# Patient Record
Sex: FEMALE | Race: WHITE | NOT HISPANIC OR LATINO
[De-identification: names, ages, dates, MRNs, and addresses within clinical notes are randomized per-mention and may not be internally consistent; named-entity substitution may affect disease eponyms.]

---

## 2017-05-22 ENCOUNTER — APPOINTMENT (OUTPATIENT)
Dept: HEMATOLOGY ONCOLOGY | Facility: CLINIC | Age: 72
End: 2017-05-22

## 2017-05-22 VITALS
HEART RATE: 67 BPM | BODY MASS INDEX: 22.32 KG/M2 | SYSTOLIC BLOOD PRESSURE: 158 MMHG | TEMPERATURE: 97.8 F | RESPIRATION RATE: 12 BRPM | HEIGHT: 63 IN | DIASTOLIC BLOOD PRESSURE: 78 MMHG | WEIGHT: 126 LBS

## 2017-05-22 LAB
BASOPHILS # BLD: 0.04 TH/MM3
BASOPHILS NFR BLD: 0.7 %
EOSINOPHIL # BLD: 0.29 TH/MM3
EOSINOPHIL NFR BLD: 4.9 %
ERYTHROCYTE [DISTWIDTH] IN BLOOD BY AUTOMATED COUNT: 12.5 %
GRANULOCYTES # BLD: 3.61 TH/MM3
GRANULOCYTES NFR BLD: 60.5 %
HCT VFR BLD AUTO: 37.1 %
HGB BLD-MCNC: 12.5 G/DL
IMM GRANULOCYTES # BLD: 0.09 TH/MM3
IMM GRANULOCYTES NFR BLD: 1.5 %
LYMPHOCYTES # BLD: 1.36 TH/MM3
LYMPHOCYTES NFR BLD: 22.8 %
MCH RBC QN AUTO: 31 PG
MCHC RBC AUTO-ENTMCNC: 33.7 G/DL
MCV RBC AUTO: 92.1 FL
MONOCYTES # BLD: 0.57 TH/MM3
MONOCYTES NFR BLD: 9.6 %
PLATELET # BLD: 181 TH/MM3
PMV BLD AUTO: 9.8 FL
RBC # BLD AUTO: 4.03 MIL/MM3
WBC # BLD: 5.96 TH/MM3

## 2017-05-23 LAB
ALBUMIN SERPL-MCNC: 4.1 G/DL
ALBUMIN/GLOB SERPL: 5.13
ALP SERPL-CCNC: 41 IU/L
ALT SERPL-CCNC: 17 IU/L
ANION GAP SERPL CALC-SCNC: 17 MEQ/L
AST SERPL-CCNC: 26 IU/L
BILIRUB SERPL-MCNC: 0.7 MG/DL
BUN SERPL-MCNC: 17 MG/DL
BUN/CREAT SERPL: 27.4 %
CALCIUM SERPL-MCNC: 9.7 MG/DL
CHLORIDE SERPL-SCNC: 106 MEQ/L
CO2 SERPL-SCNC: 19 MEQ/L
CREAT SERPL-MCNC: 0.62 MG/DL
GFR SERPL CREATININE-BSD FRML MDRD: 95
GLUCOSE SERPL-MCNC: 53 MG/DL
POTASSIUM SERPL-SCNC: 5 MMOL/L
PROT SERPL-MCNC: 4.9 G/DL
SODIUM SERPL-SCNC: 142 MEQ/L

## 2017-05-30 ENCOUNTER — OUTPATIENT (OUTPATIENT)
Dept: OUTPATIENT SERVICES | Facility: HOSPITAL | Age: 72
LOS: 1 days | Discharge: HOME | End: 2017-05-30

## 2017-06-19 ENCOUNTER — OUTPATIENT (OUTPATIENT)
Dept: OUTPATIENT SERVICES | Facility: HOSPITAL | Age: 72
LOS: 1 days | Discharge: HOME | End: 2017-06-19

## 2017-06-19 ENCOUNTER — APPOINTMENT (OUTPATIENT)
Dept: HEMATOLOGY ONCOLOGY | Facility: CLINIC | Age: 72
End: 2017-06-19

## 2017-06-22 LAB
ALBUMIN MFR SERPL ELPH: 57.3 %
ALBUMIN SERPL ELPH-MCNC: 3.9 G/DL
ALBUMIN/GLOB SERPL ELPH: 1.3 ZZ
ALPHA1 GLOB MFR SERPL ELPH: 3.7 %
ALPHA1 GLOB SERPL ELPH-MCNC: 0.3 G/DL
ALPHA2 GLOB MFR SERPL ELPH: 11.1 %
ALPHA2 GLOB SERPL ELPH-MCNC: 0.8 G/DL
B-GLOBULIN SERPL ELPH-MCNC: 0.7 G/DL
BETA1 GLOB MFR SERPL ELPH: 9.9 %
GAMMA GLOB MFR SERPL ELPH: 18 %
GAMMA GLOB SERPL ELPH-MCNC: 1.2 G/DL
IGA FLD-MCNC: 109 MG/DL
IGG FLD-MCNC: 1160 MG/DL
IGM SERPL-MCNC: 340 MG/DL
PROT PATTERN SERPL ELPH-IMP: 6.8 G/DL
PROT PATTERN SERPL ELPH-IMP: NORMAL
PROT SERPL-MCNC: 6.8 G/DL

## 2017-06-23 LAB
INTERPRETATION SERPL IFE-IMP: NORMAL
KAPPA LC FREE SER-MCNC: 21.3 MG/L
KAPPA LC FREE/LAMBDA FREE SER: 0.78
LAMBDA LC FREE SERPL-MCNC: 27.2 MG/L

## 2017-06-28 DIAGNOSIS — Z85.3 PERSONAL HISTORY OF MALIGNANT NEOPLASM OF BREAST: ICD-10-CM

## 2017-09-26 ENCOUNTER — OUTPATIENT (OUTPATIENT)
Dept: OUTPATIENT SERVICES | Facility: HOSPITAL | Age: 72
LOS: 1 days | Discharge: HOME | End: 2017-09-26

## 2017-09-27 DIAGNOSIS — I48.0 PAROXYSMAL ATRIAL FIBRILLATION: ICD-10-CM

## 2017-11-20 ENCOUNTER — APPOINTMENT (OUTPATIENT)
Dept: HEMATOLOGY ONCOLOGY | Facility: CLINIC | Age: 72
End: 2017-11-20

## 2017-11-20 ENCOUNTER — OUTPATIENT (OUTPATIENT)
Dept: OUTPATIENT SERVICES | Facility: HOSPITAL | Age: 72
LOS: 1 days | Discharge: HOME | End: 2017-11-20

## 2017-11-20 VITALS
RESPIRATION RATE: 12 BRPM | WEIGHT: 125 LBS | DIASTOLIC BLOOD PRESSURE: 86 MMHG | SYSTOLIC BLOOD PRESSURE: 181 MMHG | HEART RATE: 68 BPM | BODY MASS INDEX: 22.15 KG/M2 | TEMPERATURE: 97.8 F | HEIGHT: 63 IN

## 2017-11-20 LAB
BASOPHILS # BLD: 0.03 TH/MM3
BASOPHILS NFR BLD: 0.5 %
EOSINOPHIL # BLD: 0.2 TH/MM3
EOSINOPHIL NFR BLD: 3.2 %
ERYTHROCYTE [DISTWIDTH] IN BLOOD BY AUTOMATED COUNT: 12.4 %
GRANULOCYTES # BLD: 4.15 TH/MM3
GRANULOCYTES NFR BLD: 67.3 %
HCT VFR BLD AUTO: 36.6 %
HGB BLD-MCNC: 12 G/DL
IMM GRANULOCYTES # BLD: 0.01 TH/MM3
IMM GRANULOCYTES NFR BLD: 0.2 %
LYMPHOCYTES # BLD: 1.23 TH/MM3
LYMPHOCYTES NFR BLD: 19.9 %
MCH RBC QN AUTO: 30.5 PG
MCHC RBC AUTO-ENTMCNC: 32.8 G/DL
MCV RBC AUTO: 92.9 FL
MONOCYTES # BLD: 0.55 TH/MM3
MONOCYTES NFR BLD: 8.9 %
PLATELET # BLD: 215 TH/MM3
PMV BLD AUTO: 9.3 FL
RBC # BLD AUTO: 3.94 MIL/MM3
WBC # BLD: 6.17 TH/MM3

## 2017-11-21 LAB
ALBUMIN SERPL-MCNC: 4.1 G/DL
ALBUMIN/GLOB SERPL: 1.64
ALP SERPL-CCNC: 50 IU/L
ALT SERPL-CCNC: 23 IU/L
ANION GAP SERPL CALC-SCNC: 7 MEQ/L
AST SERPL-CCNC: 28 IU/L
BILIRUB SERPL-MCNC: 0.8 MG/DL
BUN SERPL-MCNC: 14 MG/DL
BUN/CREAT SERPL: 20.9 %
CALCIUM SERPL-MCNC: 9.8 MG/DL
CANCER AG15-3 SERPL-ACNC: 12.5 U/ML
CHLORIDE SERPL-SCNC: 107 MEQ/L
CO2 SERPL-SCNC: 26 MEQ/L
CREAT SERPL-MCNC: 0.67 MG/DL
GFR SERPL CREATININE-BSD FRML MDRD: 87
GLUCOSE SERPL-MCNC: 72 MG/DL
POTASSIUM SERPL-SCNC: 4.3 MMOL/L
PROT SERPL-MCNC: 6.6 G/DL
SODIUM SERPL-SCNC: 140 MEQ/L

## 2017-11-27 DIAGNOSIS — C50.919 MALIGNANT NEOPLASM OF UNSPECIFIED SITE OF UNSPECIFIED FEMALE BREAST: ICD-10-CM

## 2018-05-04 ENCOUNTER — OUTPATIENT (OUTPATIENT)
Dept: OUTPATIENT SERVICES | Facility: HOSPITAL | Age: 73
LOS: 1 days | Discharge: HOME | End: 2018-05-04

## 2018-05-04 VITALS
OXYGEN SATURATION: 99 % | SYSTOLIC BLOOD PRESSURE: 160 MMHG | DIASTOLIC BLOOD PRESSURE: 72 MMHG | HEIGHT: 62 IN | RESPIRATION RATE: 17 BRPM | TEMPERATURE: 98 F | WEIGHT: 121.92 LBS | HEART RATE: 71 BPM

## 2018-05-04 VITALS — RESPIRATION RATE: 17 BRPM | HEART RATE: 71 BPM | OXYGEN SATURATION: 100 %

## 2018-05-04 DIAGNOSIS — Z96.642 PRESENCE OF LEFT ARTIFICIAL HIP JOINT: Chronic | ICD-10-CM

## 2018-05-04 DIAGNOSIS — Z90.13 ACQUIRED ABSENCE OF BILATERAL BREASTS AND NIPPLES: Chronic | ICD-10-CM

## 2018-05-04 RX ORDER — ACETAMINOPHEN 500 MG
650 TABLET ORAL ONCE
Qty: 0 | Refills: 0 | Status: DISCONTINUED | OUTPATIENT
Start: 2018-05-04 | End: 2018-05-19

## 2018-05-04 RX ORDER — SODIUM CHLORIDE 9 MG/ML
1000 INJECTION, SOLUTION INTRAVENOUS
Qty: 0 | Refills: 0 | Status: DISCONTINUED | OUTPATIENT
Start: 2018-05-04 | End: 2018-05-19

## 2018-05-04 RX ORDER — ONDANSETRON 8 MG/1
4 TABLET, FILM COATED ORAL ONCE
Qty: 0 | Refills: 0 | Status: DISCONTINUED | OUTPATIENT
Start: 2018-05-04 | End: 2018-05-19

## 2018-05-08 DIAGNOSIS — H25.89 OTHER AGE-RELATED CATARACT: ICD-10-CM

## 2018-05-08 DIAGNOSIS — I10 ESSENTIAL (PRIMARY) HYPERTENSION: ICD-10-CM

## 2018-05-08 DIAGNOSIS — H52.202 UNSPECIFIED ASTIGMATISM, LEFT EYE: ICD-10-CM

## 2018-05-21 ENCOUNTER — APPOINTMENT (OUTPATIENT)
Dept: HEMATOLOGY ONCOLOGY | Facility: CLINIC | Age: 73
End: 2018-05-21

## 2018-05-21 ENCOUNTER — LABORATORY RESULT (OUTPATIENT)
Age: 73
End: 2018-05-21

## 2018-05-21 ENCOUNTER — OUTPATIENT (OUTPATIENT)
Dept: OUTPATIENT SERVICES | Facility: HOSPITAL | Age: 73
LOS: 1 days | Discharge: HOME | End: 2018-05-21

## 2018-05-21 VITALS
DIASTOLIC BLOOD PRESSURE: 57 MMHG | HEART RATE: 72 BPM | WEIGHT: 122 LBS | TEMPERATURE: 99 F | RESPIRATION RATE: 14 BRPM | BODY MASS INDEX: 21.62 KG/M2 | HEIGHT: 63 IN | SYSTOLIC BLOOD PRESSURE: 146 MMHG

## 2018-05-21 DIAGNOSIS — Z90.13 ACQUIRED ABSENCE OF BILATERAL BREASTS AND NIPPLES: Chronic | ICD-10-CM

## 2018-05-21 DIAGNOSIS — Z96.642 PRESENCE OF LEFT ARTIFICIAL HIP JOINT: Chronic | ICD-10-CM

## 2018-05-22 LAB
ALBUMIN SERPL ELPH-MCNC: 4.1 G/DL
ALP BLD-CCNC: 53 U/L
ALT SERPL-CCNC: 17 U/L
ANION GAP SERPL CALC-SCNC: 17 MMOL/L
AST SERPL-CCNC: 29 U/L
BILIRUB SERPL-MCNC: 0.4 MG/DL
BUN SERPL-MCNC: 15 MG/DL
CALCIUM SERPL-MCNC: 10 MG/DL
CHLORIDE SERPL-SCNC: 103 MMOL/L
CO2 SERPL-SCNC: 23 MMOL/L
CREAT SERPL-MCNC: 0.8 MG/DL
GLUCOSE SERPL-MCNC: 74 MG/DL
HCT VFR BLD CALC: 37.6 %
HGB BLD-MCNC: 12.3 G/DL
MCHC RBC-ENTMCNC: 30.5 PG
MCHC RBC-ENTMCNC: 32.7 G/DL
MCV RBC AUTO: 93.3 FL
PLATELET # BLD AUTO: 169 K/UL
PMV BLD: 9.5 FL
POTASSIUM SERPL-SCNC: 4.4 MMOL/L
PROT SERPL-MCNC: 7.1 G/DL
RBC # BLD: 4.03 M/UL
RBC # FLD: 12 %
SODIUM SERPL-SCNC: 143 MMOL/L
WBC # FLD AUTO: 5.8 K/UL

## 2018-05-23 DIAGNOSIS — Z85.3 PERSONAL HISTORY OF MALIGNANT NEOPLASM OF BREAST: ICD-10-CM

## 2018-05-31 ENCOUNTER — OUTPATIENT (OUTPATIENT)
Dept: OUTPATIENT SERVICES | Facility: HOSPITAL | Age: 73
LOS: 1 days | Discharge: HOME | End: 2018-05-31

## 2018-05-31 DIAGNOSIS — I25.10 ATHEROSCLEROTIC HEART DISEASE OF NATIVE CORONARY ARTERY WITHOUT ANGINA PECTORIS: ICD-10-CM

## 2018-05-31 DIAGNOSIS — Z79.01 LONG TERM (CURRENT) USE OF ANTICOAGULANTS: ICD-10-CM

## 2018-05-31 DIAGNOSIS — Z96.642 PRESENCE OF LEFT ARTIFICIAL HIP JOINT: Chronic | ICD-10-CM

## 2018-05-31 DIAGNOSIS — Z90.13 ACQUIRED ABSENCE OF BILATERAL BREASTS AND NIPPLES: Chronic | ICD-10-CM

## 2018-05-31 DIAGNOSIS — Z01.810 ENCOUNTER FOR PREPROCEDURAL CARDIOVASCULAR EXAMINATION: ICD-10-CM

## 2018-06-07 ENCOUNTER — OUTPATIENT (OUTPATIENT)
Dept: OUTPATIENT SERVICES | Facility: HOSPITAL | Age: 73
LOS: 1 days | Discharge: HOME | End: 2018-06-07

## 2018-06-07 DIAGNOSIS — Z90.13 ACQUIRED ABSENCE OF BILATERAL BREASTS AND NIPPLES: Chronic | ICD-10-CM

## 2018-06-07 DIAGNOSIS — I48.0 PAROXYSMAL ATRIAL FIBRILLATION: ICD-10-CM

## 2018-06-07 DIAGNOSIS — Z96.642 PRESENCE OF LEFT ARTIFICIAL HIP JOINT: Chronic | ICD-10-CM

## 2018-06-07 NOTE — PROGRESS NOTE ADULT - SUBJECTIVE AND OBJECTIVE BOX
PREOPERATIVE DAY OR PROCEDURE EVALUATION    I have personally seen and examined the patient.  I agree with the history and physical which I have reviewed and noted any changes below.  (ELECTRONICALLY SIGNED: - [id number] @ 10:02)                                                              POST OPERATIVE PROCEDURAL DOCUMENTATION  PRE-OP DIAGNOSIS: fibroelastoma of AV     POST-OP DIAGNOSIS Fibroelastoma of AV     PROCEDURE: Transesophageal echocardiogram    Primary Physician: Rajeev Lim MD  Fellow: Dr Mauricio    ANESTHESIA TYPE  [x  ] General Anesthesia  [  ] Conscious Sedation  [  ] Local/Regional    CONDITION  [  ] Critical  [  ] Serious  [  ] Fair  [  x] Good    SPECIMENS REMOVED (IF APPLICABLE): NA    IMPLANTS (IF APPLICABLE): None    ESTIMATED BLOOD LOSS: None    COMPLICATIONS: None    FINDINGS  Fibroelastoma was noted on the Right coronary cusp measuring 1.2 cm unchanged from previous TOM last year   Normal EF   mod TR      DIAGNOSIS/IMPRESSION:  Fibroelastoma of the AV       PLAN OF CARE:  out patient follow up   may resume diet in 2 hours

## 2018-06-07 NOTE — H&P ADULT - NSHPREVIEWOFSYSTEMS_GEN_ALL_CORE
REVIEW OF SYSTEMS      CONSTITUTIONAL:  No night sweats.  No fatigue, malaise, lethargy.  No fever or chills.    RESPIRATORY:  No cough.  No wheeze.  No hemoptysis.  No shortness of breath.    CARDIOVASCULAR:  No chest pains.  No palpitations.     GASTROINTESTINAL:  No abdominal pain.  No nausea or vomiting.  No diarrhea or constipation.  No hematemesis.  No hematochezia.  No melena.    MUSCULOSKELETAL:  No musculoskeletal pain.  No joint swelling.  No arthritis.    NEUROLOGICAL:    No headache or neck pain.  No syncope or seizure. no weakness . No Vertigo.    SKIN:  No rashes.  No lesions.  No wounds.    ENDOCRINE:  No unexplained weight loss.  No polydipsia.  No polyuria.  No polyphagia.    ALLERGIC AND IMMUNOLOGIC:  No pruritus.  No swelling.

## 2018-06-07 NOTE — H&P ADULT - NSHPPHYSICALEXAM_GEN_ALL_CORE
PHYSICAL EXAM:    GENERAL APPEARANCE:  The patient is alert, oriented . He is in no acute distress.  HEART:  S1 S2 Regular rate and rhythm. + systolic murmur in the apex  LUNGS:  clear to ausculation bilaterally.  No crackles or wheezes are heard.  ABDOMEN:  Soft, nontender, nondistended with good bowel sounds heard.  Inguinal area is normal.  EXTREMITIES:  bilateral upper extremity lymphedema (HX of Breast CA surgery)  NEUROLOGICAL:  Gross nonfocal.  Skin:  Warm and dry without any rash.  There is no costovertebral angle tenderness.

## 2018-07-12 ENCOUNTER — OUTPATIENT (OUTPATIENT)
Dept: OUTPATIENT SERVICES | Facility: HOSPITAL | Age: 73
LOS: 1 days | Discharge: HOME | End: 2018-07-12

## 2018-07-12 DIAGNOSIS — Z90.13 ACQUIRED ABSENCE OF BILATERAL BREASTS AND NIPPLES: Chronic | ICD-10-CM

## 2018-07-12 DIAGNOSIS — R06.02 SHORTNESS OF BREATH: ICD-10-CM

## 2018-07-12 DIAGNOSIS — R03.0 ELEVATED BLOOD-PRESSURE READING, WITHOUT DIAGNOSIS OF HYPERTENSION: ICD-10-CM

## 2018-07-12 DIAGNOSIS — Z96.642 PRESENCE OF LEFT ARTIFICIAL HIP JOINT: Chronic | ICD-10-CM

## 2018-07-12 DIAGNOSIS — R07.89 OTHER CHEST PAIN: ICD-10-CM

## 2019-04-01 PROBLEM — C50.919 MALIGNANT NEOPLASM OF UNSPECIFIED SITE OF UNSPECIFIED FEMALE BREAST: Chronic | Status: ACTIVE | Noted: 2018-05-04

## 2019-04-01 PROBLEM — J30.1 ALLERGIC RHINITIS DUE TO POLLEN: Chronic | Status: ACTIVE | Noted: 2018-05-04

## 2019-04-01 PROBLEM — I10 ESSENTIAL (PRIMARY) HYPERTENSION: Chronic | Status: ACTIVE | Noted: 2018-05-04

## 2019-05-07 ENCOUNTER — APPOINTMENT (OUTPATIENT)
Dept: CARDIOLOGY | Facility: CLINIC | Age: 74
End: 2019-05-07
Payer: MEDICARE

## 2019-05-07 PROCEDURE — 93880 EXTRACRANIAL BILAT STUDY: CPT

## 2019-05-20 ENCOUNTER — APPOINTMENT (OUTPATIENT)
Dept: CARDIOLOGY | Facility: CLINIC | Age: 74
End: 2019-05-20
Payer: MEDICARE

## 2019-05-20 PROCEDURE — 93306 TTE W/DOPPLER COMPLETE: CPT

## 2019-05-28 ENCOUNTER — APPOINTMENT (OUTPATIENT)
Dept: CARDIOLOGY | Facility: CLINIC | Age: 74
End: 2019-05-28
Payer: MEDICARE

## 2019-05-28 PROCEDURE — 93000 ELECTROCARDIOGRAM COMPLETE: CPT

## 2019-05-28 PROCEDURE — 99214 OFFICE O/P EST MOD 30 MIN: CPT

## 2019-06-13 ENCOUNTER — OUTPATIENT (OUTPATIENT)
Dept: OUTPATIENT SERVICES | Facility: HOSPITAL | Age: 74
LOS: 1 days | Discharge: HOME | End: 2019-06-13
Payer: MEDICARE

## 2019-06-13 VITALS — HEIGHT: 63 IN | WEIGHT: 125 LBS

## 2019-06-13 DIAGNOSIS — Z90.13 ACQUIRED ABSENCE OF BILATERAL BREASTS AND NIPPLES: Chronic | ICD-10-CM

## 2019-06-13 DIAGNOSIS — Z96.642 PRESENCE OF LEFT ARTIFICIAL HIP JOINT: Chronic | ICD-10-CM

## 2019-06-13 PROCEDURE — 93325 DOPPLER ECHO COLOR FLOW MAPG: CPT | Mod: 26

## 2019-06-13 PROCEDURE — 93320 DOPPLER ECHO COMPLETE: CPT | Mod: 26

## 2019-06-13 PROCEDURE — 93312 ECHO TRANSESOPHAGEAL: CPT | Mod: 26

## 2019-06-13 NOTE — ASU PATIENT PROFILE, ADULT - PMH
AF (paroxysmal atrial fibrillation)    Breast cancer    Hayfever    HTN (hypertension)    Nonrheumatic mitral valve insufficiency AF (paroxysmal atrial fibrillation)    Breast cancer    Hayfever    HTN (hypertension)    Nonrheumatic mitral valve insufficiency    Papillary fibroelastoma of heart

## 2019-06-13 NOTE — H&P CARDIOLOGY - HISTORY OF PRESENT ILLNESS
73yF PMhx of MR, MM, DLD, DM presents for elective TOM to monitor for progression of mitral regurgitation. She has some SOB on strenuous exertion. 73yF PMhx of MR, MM, DLD, DM presents for elective TOM to monitor for progression of mitral regurgitation and fibrolastoma She has some SOB on strenuous exertion.

## 2019-06-13 NOTE — CHART NOTE - NSCHARTNOTEFT_GEN_A_CORE
TOM Procedure Note:     After risks, benefits and alternatives of the procedure were explained, consent was signed and placed in the medical record.  Procedural timeout was taken.  Sedation was administered by anesthesia.  TOM probe inserted without complication and TOM performed.  Patient tolerated the procedure well without complication.  Post-procedure vital signs were stable.    TOM findings discussed with patient and referring cardiologist.       TOM findings:  LA: NL , no MARCELLA thrombus  RA : normal  IA septum : intact  MV: normal structure , with mild MR ( 2 cental jets)  AV: there is a fibroelastoma on the right coronary cusp , measuring 1-1.2 cm  TV: mild TR  LV: NL  RV: NL  Aorta: no atheroma    see report for full details. TOM Procedure Note:     After risks, benefits and alternatives of the procedure were explained, consent was signed and placed in the medical record.  Procedural timeout was taken.  Sedation was administered by anesthesia.  TOM probe inserted without complication and TOM performed.  Patient tolerated the procedure well without complication.  Post-procedure vital signs were stable.    TOM findings discussed with patient and referring cardiologist.       TOM findings:  LA: NL , no MARCELLA thrombus  RA : normal  IA septum : intact  MV: thickened anteriorly and postriorly with post leaflet prolapse , with mild MR ( 2 cental jets)  AV: there is a fibroelastoma on the right coronary cusp , measuring 1-1.2 cm  TV: mild TR  LV: NL  RV: NL  Aorta: no atheroma    see report for full details.

## 2019-06-14 DIAGNOSIS — I36.8 OTHER NONRHEUMATIC TRICUSPID VALVE DISORDERS: ICD-10-CM

## 2019-06-17 ENCOUNTER — LABORATORY RESULT (OUTPATIENT)
Age: 74
End: 2019-06-17

## 2019-06-17 ENCOUNTER — APPOINTMENT (OUTPATIENT)
Dept: HEMATOLOGY ONCOLOGY | Facility: CLINIC | Age: 74
End: 2019-06-17
Payer: MEDICARE

## 2019-06-17 ENCOUNTER — OUTPATIENT (OUTPATIENT)
Dept: OUTPATIENT SERVICES | Facility: HOSPITAL | Age: 74
LOS: 1 days | Discharge: HOME | End: 2019-06-17

## 2019-06-17 VITALS
DIASTOLIC BLOOD PRESSURE: 66 MMHG | SYSTOLIC BLOOD PRESSURE: 147 MMHG | BODY MASS INDEX: 21.62 KG/M2 | HEART RATE: 76 BPM | HEIGHT: 63 IN | RESPIRATION RATE: 14 BRPM | WEIGHT: 122 LBS | TEMPERATURE: 98.3 F

## 2019-06-17 DIAGNOSIS — E04.1 NONTOXIC SINGLE THYROID NODULE: ICD-10-CM

## 2019-06-17 DIAGNOSIS — Z90.13 ACQUIRED ABSENCE OF BILATERAL BREASTS AND NIPPLES: Chronic | ICD-10-CM

## 2019-06-17 DIAGNOSIS — Z96.642 PRESENCE OF LEFT ARTIFICIAL HIP JOINT: Chronic | ICD-10-CM

## 2019-06-17 PROCEDURE — 99213 OFFICE O/P EST LOW 20 MIN: CPT

## 2019-06-17 NOTE — PHYSICAL EXAM
[Thin] : thin [Normal] : affect appropriate [de-identified] : S/P bilateral mastectomies clinically no evidence of local recurrence [de-identified] : Arthritic changes present.

## 2019-06-17 NOTE — ASSESSMENT
[FreeTextEntry1] : History of bilateral breast carcinomas (1997 and 2001), S/P bilateral mastectomies followed by chemotherapy, hormonal therapy and radiation to the chest wall, clinically no evidence of recurrence.\par \par We will obtain a CBC and CMP. If all within acceptable limits, she will be seen again in a year for follow up. Otherwise she will follow up with her primary physician and the gynecologist. \par We will also get a thyroid sonogram since she was noted to have a thyroid nodule in the past.\par \par All questions answered. \par \par

## 2019-06-17 NOTE — REVIEW OF SYSTEMS
[Fatigue] : fatigue [Constipation] : constipation [Joint Stiffness] : joint stiffness [Negative] : Heme/Lymph [FreeTextEntry3] : S/P cataract surgery (left)

## 2019-06-17 NOTE — HISTORY OF PRESENT ILLNESS
[Disease: _____________________] : Disease: [unfilled] [de-identified] : The patient is coming for her regularly scheduled yearly follow up.\par Since last year, she had replacement of the second hip (the right).\par Otherwise, she was followed closely by her cardiologists. She had a TOM for a possible small tumor at the aortic valve. She was told that she should be observed for the time being.\par Otherwise, she seems to be stable. However, when she thinks about her problems, she gets anxious and nervous.\par No fever or night sweats unless she gets cellulitis of the arm/arms which responds fast to antibiotherapy.\par She is still chronically constipated.\par No problems with the urine. The appetite is stable.\par

## 2019-06-18 PROBLEM — D15.1 BENIGN NEOPLASM OF HEART: Chronic | Status: ACTIVE | Noted: 2019-06-13

## 2019-06-18 PROBLEM — I34.0 NONRHEUMATIC MITRAL (VALVE) INSUFFICIENCY: Chronic | Status: ACTIVE | Noted: 2019-06-13

## 2019-06-18 LAB
ALBUMIN SERPL ELPH-MCNC: 4.2 G/DL
ALP BLD-CCNC: 51 U/L
ALT SERPL-CCNC: 18 U/L
ANION GAP SERPL CALC-SCNC: 14 MMOL/L
AST SERPL-CCNC: 26 U/L
BILIRUB SERPL-MCNC: 0.5 MG/DL
BUN SERPL-MCNC: 16 MG/DL
CALCIUM SERPL-MCNC: 10.3 MG/DL
CHLORIDE SERPL-SCNC: 103 MMOL/L
CO2 SERPL-SCNC: 25 MMOL/L
CREAT SERPL-MCNC: 0.7 MG/DL
GLUCOSE SERPL-MCNC: 76 MG/DL
HCT VFR BLD CALC: 37.7 %
HGB BLD-MCNC: 12.4 G/DL
MCHC RBC-ENTMCNC: 30.6 PG
MCHC RBC-ENTMCNC: 32.9 G/DL
MCV RBC AUTO: 93.1 FL
PLATELET # BLD AUTO: 247 K/UL
PMV BLD: 9.5 FL
POTASSIUM SERPL-SCNC: 4.3 MMOL/L
PROT SERPL-MCNC: 7.4 G/DL
RBC # BLD: 4.05 M/UL
RBC # FLD: 12.1 %
SODIUM SERPL-SCNC: 142 MMOL/L
WBC # FLD AUTO: 5.9 K/UL

## 2019-06-19 DIAGNOSIS — E04.1 NONTOXIC SINGLE THYROID NODULE: ICD-10-CM

## 2019-06-19 DIAGNOSIS — Z85.3 PERSONAL HISTORY OF MALIGNANT NEOPLASM OF BREAST: ICD-10-CM

## 2019-06-19 LAB
T4 SERPL-MCNC: 6.3 UG/DL
TSH SERPL-ACNC: 1.56 UIU/ML

## 2019-06-20 ENCOUNTER — FORM ENCOUNTER (OUTPATIENT)
Age: 74
End: 2019-06-20

## 2019-06-21 ENCOUNTER — OUTPATIENT (OUTPATIENT)
Dept: OUTPATIENT SERVICES | Facility: HOSPITAL | Age: 74
LOS: 1 days | Discharge: HOME | End: 2019-06-21
Payer: MEDICARE

## 2019-06-21 DIAGNOSIS — Z96.642 PRESENCE OF LEFT ARTIFICIAL HIP JOINT: Chronic | ICD-10-CM

## 2019-06-21 DIAGNOSIS — Z90.13 ACQUIRED ABSENCE OF BILATERAL BREASTS AND NIPPLES: Chronic | ICD-10-CM

## 2019-06-21 DIAGNOSIS — E04.1 NONTOXIC SINGLE THYROID NODULE: ICD-10-CM

## 2019-06-21 PROCEDURE — 76536 US EXAM OF HEAD AND NECK: CPT | Mod: 26

## 2019-12-05 ENCOUNTER — RESULT REVIEW (OUTPATIENT)
Age: 74
End: 2019-12-05

## 2019-12-05 ENCOUNTER — TRANSCRIPTION ENCOUNTER (OUTPATIENT)
Age: 74
End: 2019-12-05

## 2019-12-05 ENCOUNTER — OUTPATIENT (OUTPATIENT)
Dept: OUTPATIENT SERVICES | Facility: HOSPITAL | Age: 74
LOS: 1 days | Discharge: HOME | End: 2019-12-05
Payer: MEDICARE

## 2019-12-05 VITALS
SYSTOLIC BLOOD PRESSURE: 183 MMHG | DIASTOLIC BLOOD PRESSURE: 72 MMHG | HEART RATE: 56 BPM | RESPIRATION RATE: 18 BRPM | OXYGEN SATURATION: 98 %

## 2019-12-05 VITALS
DIASTOLIC BLOOD PRESSURE: 77 MMHG | SYSTOLIC BLOOD PRESSURE: 178 MMHG | HEIGHT: 63 IN | WEIGHT: 125 LBS | TEMPERATURE: 98 F | RESPIRATION RATE: 18 BRPM | HEART RATE: 83 BPM

## 2019-12-05 DIAGNOSIS — Z96.643 PRESENCE OF ARTIFICIAL HIP JOINT, BILATERAL: Chronic | ICD-10-CM

## 2019-12-05 DIAGNOSIS — Z96.642 PRESENCE OF LEFT ARTIFICIAL HIP JOINT: Chronic | ICD-10-CM

## 2019-12-05 DIAGNOSIS — Z90.13 ACQUIRED ABSENCE OF BILATERAL BREASTS AND NIPPLES: Chronic | ICD-10-CM

## 2019-12-05 PROCEDURE — 43259 EGD US EXAM DUODENUM/JEJUNUM: CPT

## 2019-12-05 PROCEDURE — 43239 EGD BIOPSY SINGLE/MULTIPLE: CPT | Mod: XU

## 2019-12-05 PROCEDURE — 88312 SPECIAL STAINS GROUP 1: CPT | Mod: 26

## 2019-12-05 PROCEDURE — 88173 CYTOPATH EVAL FNA REPORT: CPT | Mod: 26

## 2019-12-05 PROCEDURE — 88305 TISSUE EXAM BY PATHOLOGIST: CPT | Mod: 26

## 2019-12-05 RX ORDER — CIPROFLOXACIN LACTATE 400MG/40ML
400 VIAL (ML) INTRAVENOUS ONCE
Refills: 0 | Status: COMPLETED | OUTPATIENT
Start: 2019-12-05 | End: 2019-12-05

## 2019-12-05 RX ORDER — CIPROFLOXACIN LACTATE 400MG/40ML
200 VIAL (ML) INTRAVENOUS ONCE
Refills: 0 | Status: DISCONTINUED | OUTPATIENT
Start: 2019-12-05 | End: 2019-12-28

## 2019-12-05 RX ADMIN — Medication 200 MILLIGRAM(S): at 17:13

## 2019-12-05 NOTE — H&P ADULT - ASSESSMENT
Patient referred by Dr. Rowell for evaluation of a Pancreatic cyst. CT from 2013 notable for a Pancreatic body cyst with connection to PD ( at the time 1 cm).     Plan EUS with possible FNA

## 2019-12-05 NOTE — ASU PATIENT PROFILE, ADULT - PMH
AF (paroxysmal atrial fibrillation)    Breast cancer    Hayfever    HTN (hypertension)    Nonrheumatic mitral valve insufficiency    Papillary fibroelastoma of heart

## 2019-12-05 NOTE — ASU PATIENT PROFILE, ADULT - PSH
H/O bilateral mastectomy  BILAT LYMPHEDEMA  H/O total hip arthroplasty, left    History of total hip replacement, bilateral

## 2019-12-05 NOTE — H&P ADULT - HISTORY OF PRESENT ILLNESS
Patient referred by Dr. Rowell for evaluation of a Pancreatic cyst. CT from 2013 notable for a Pancreatic body cyst with connection to PD ( at the time 1 cm).

## 2019-12-06 LAB
AMYLASE FLD-CCNC: 81 U/L — SIGNIFICANT CHANGE UP
SPECIMEN SOURCE FLD: SIGNIFICANT CHANGE UP

## 2019-12-07 LAB — CEA FLD-MCNC: 1405 NG/ML — SIGNIFICANT CHANGE UP

## 2019-12-09 LAB
NON-GYNECOLOGICAL CYTOLOGY STUDY: SIGNIFICANT CHANGE UP
SURGICAL PATHOLOGY STUDY: SIGNIFICANT CHANGE UP

## 2019-12-10 ENCOUNTER — APPOINTMENT (OUTPATIENT)
Dept: CARDIOLOGY | Facility: CLINIC | Age: 74
End: 2019-12-10
Payer: MEDICARE

## 2019-12-10 PROCEDURE — 93000 ELECTROCARDIOGRAM COMPLETE: CPT

## 2019-12-10 PROCEDURE — 99214 OFFICE O/P EST MOD 30 MIN: CPT

## 2019-12-11 DIAGNOSIS — K86.2 CYST OF PANCREAS: ICD-10-CM

## 2019-12-11 DIAGNOSIS — Z79.82 LONG TERM (CURRENT) USE OF ASPIRIN: ICD-10-CM

## 2019-12-11 DIAGNOSIS — K31.89 OTHER DISEASES OF STOMACH AND DUODENUM: ICD-10-CM

## 2019-12-11 DIAGNOSIS — K29.50 UNSPECIFIED CHRONIC GASTRITIS WITHOUT BLEEDING: ICD-10-CM

## 2020-06-08 ENCOUNTER — APPOINTMENT (OUTPATIENT)
Dept: CARDIOLOGY | Facility: CLINIC | Age: 75
End: 2020-06-08
Payer: MEDICARE

## 2020-06-08 PROCEDURE — 93306 TTE W/DOPPLER COMPLETE: CPT

## 2020-06-18 ENCOUNTER — APPOINTMENT (OUTPATIENT)
Dept: CARDIOLOGY | Facility: CLINIC | Age: 75
End: 2020-06-18

## 2020-06-29 ENCOUNTER — APPOINTMENT (OUTPATIENT)
Dept: HEMATOLOGY ONCOLOGY | Facility: CLINIC | Age: 75
End: 2020-06-29
Payer: MEDICARE

## 2020-06-29 ENCOUNTER — LABORATORY RESULT (OUTPATIENT)
Age: 75
End: 2020-06-29

## 2020-06-29 ENCOUNTER — OUTPATIENT (OUTPATIENT)
Dept: OUTPATIENT SERVICES | Facility: HOSPITAL | Age: 75
LOS: 1 days | Discharge: HOME | End: 2020-06-29

## 2020-06-29 VITALS
HEIGHT: 63 IN | DIASTOLIC BLOOD PRESSURE: 70 MMHG | BODY MASS INDEX: 21.97 KG/M2 | RESPIRATION RATE: 14 BRPM | HEART RATE: 68 BPM | SYSTOLIC BLOOD PRESSURE: 166 MMHG | WEIGHT: 124 LBS | TEMPERATURE: 98.6 F

## 2020-06-29 DIAGNOSIS — Z96.642 PRESENCE OF LEFT ARTIFICIAL HIP JOINT: Chronic | ICD-10-CM

## 2020-06-29 DIAGNOSIS — Z96.643 PRESENCE OF ARTIFICIAL HIP JOINT, BILATERAL: Chronic | ICD-10-CM

## 2020-06-29 DIAGNOSIS — Z90.13 ACQUIRED ABSENCE OF BILATERAL BREASTS AND NIPPLES: Chronic | ICD-10-CM

## 2020-06-29 PROCEDURE — 99213 OFFICE O/P EST LOW 20 MIN: CPT

## 2020-06-30 LAB
ALBUMIN SERPL ELPH-MCNC: 4.2 G/DL
ALP BLD-CCNC: 56 U/L
ALT SERPL-CCNC: 17 U/L
ANION GAP SERPL CALC-SCNC: 17 MMOL/L
AST SERPL-CCNC: 29 U/L
BILIRUB SERPL-MCNC: 0.4 MG/DL
BUN SERPL-MCNC: 14 MG/DL
CALCIUM SERPL-MCNC: 9.8 MG/DL
CHLORIDE SERPL-SCNC: 101 MMOL/L
CO2 SERPL-SCNC: 21 MMOL/L
CREAT SERPL-MCNC: 0.8 MG/DL
GLUCOSE SERPL-MCNC: 70 MG/DL
HCT VFR BLD CALC: 40.3 %
HGB BLD-MCNC: 13.2 G/DL
MCHC RBC-ENTMCNC: 30.8 PG
MCHC RBC-ENTMCNC: 32.8 G/DL
MCV RBC AUTO: 94.2 FL
PLATELET # BLD AUTO: 176 K/UL
PMV BLD: 9.4 FL
POTASSIUM SERPL-SCNC: 4.5 MMOL/L
PROT SERPL-MCNC: 7.1 G/DL
RBC # BLD: 4.28 M/UL
RBC # FLD: 11.9 %
SODIUM SERPL-SCNC: 139 MMOL/L
WBC # FLD AUTO: 5.25 K/UL

## 2020-06-30 NOTE — HISTORY OF PRESENT ILLNESS
[Disease: _____________________] : Disease: [unfilled] [de-identified] : The patient is coming for her regularly scheduled yearly follow up for her history of breast cancer.\par The year was uneventful except for her pancreatic cyst. Biopsy showed mild atypia. She was recommended to have a CT scan since the biopsy was not conclusive one way or the other. \par Otherwise, she has no new complaints. Last Cologuard a year ago was negative.\par She is due for another Pap smear. She had oophorectomy but not hysterectomy in the past.\par She was seen in the lymphedema clinic and is supposed to undergo some physical therapy.\par \par Her second breast cancer was in 2001. At that time she had bilateral mastectomy. The first cancer was diagnosed in 1997, treated with lumpectomy, RT, chemo including high dose.

## 2020-06-30 NOTE — REVIEW OF SYSTEMS
[Constipation] : constipation [Fatigue] : fatigue [Negative] : Heme/Lymph [FreeTextEntry3] : Has one cataract on the left [FreeTextEntry2] : Always feels cold.

## 2020-06-30 NOTE — ASSESSMENT
[FreeTextEntry1] : 1. History of bilateral breast carcinomas, in 1997 then in 2001, S/P bilateral mastectomy (initially Lt lumpectomy followed by RT and high dose chemotherapy with stem cell support) after the second cancer, at this time no evidence of recurrence.\par 2. Pancreatic complex cyst, followed by GI.\par \par The situation was discussed with the patient.\par CBC, CMP will be obtained. If all within acceptable limits, she will be seen again in a year for follow up, or sooner if new problems related to the cancer occur.\par In the meantime, she will continue to follow up with her gynecologist and gastroenterologist.\par \par All questions answered.\par \par

## 2020-07-01 DIAGNOSIS — Z85.3 PERSONAL HISTORY OF MALIGNANT NEOPLASM OF BREAST: ICD-10-CM

## 2020-07-13 ENCOUNTER — RECORD ABSTRACTING (OUTPATIENT)
Age: 75
End: 2020-07-13

## 2020-07-13 DIAGNOSIS — I36.0 NONRHEUMATIC TRICUSPID (VALVE) STENOSIS: ICD-10-CM

## 2020-07-13 DIAGNOSIS — Z86.79 PERSONAL HISTORY OF OTHER DISEASES OF THE CIRCULATORY SYSTEM: ICD-10-CM

## 2020-07-13 DIAGNOSIS — I65.23 OCCLUSION AND STENOSIS OF BILATERAL CAROTID ARTERIES: ICD-10-CM

## 2020-07-13 DIAGNOSIS — D15.1 BENIGN NEOPLASM OF HEART: ICD-10-CM

## 2020-07-28 ENCOUNTER — APPOINTMENT (OUTPATIENT)
Dept: CARDIOLOGY | Facility: CLINIC | Age: 75
End: 2020-07-28
Payer: MEDICARE

## 2020-07-28 VITALS
WEIGHT: 123 LBS | SYSTOLIC BLOOD PRESSURE: 118 MMHG | HEART RATE: 68 BPM | BODY MASS INDEX: 21.79 KG/M2 | DIASTOLIC BLOOD PRESSURE: 76 MMHG | HEIGHT: 63 IN | TEMPERATURE: 99 F

## 2020-07-28 PROCEDURE — 99214 OFFICE O/P EST MOD 30 MIN: CPT

## 2020-07-28 PROCEDURE — 93000 ELECTROCARDIOGRAM COMPLETE: CPT

## 2020-08-18 ENCOUNTER — LABORATORY RESULT (OUTPATIENT)
Age: 75
End: 2020-08-18

## 2020-08-18 ENCOUNTER — OUTPATIENT (OUTPATIENT)
Dept: OUTPATIENT SERVICES | Facility: HOSPITAL | Age: 75
LOS: 1 days | Discharge: HOME | End: 2020-08-18

## 2020-08-18 DIAGNOSIS — Z96.642 PRESENCE OF LEFT ARTIFICIAL HIP JOINT: Chronic | ICD-10-CM

## 2020-08-18 DIAGNOSIS — Z96.643 PRESENCE OF ARTIFICIAL HIP JOINT, BILATERAL: Chronic | ICD-10-CM

## 2020-08-18 DIAGNOSIS — Z90.13 ACQUIRED ABSENCE OF BILATERAL BREASTS AND NIPPLES: Chronic | ICD-10-CM

## 2020-08-18 DIAGNOSIS — Z11.59 ENCOUNTER FOR SCREENING FOR OTHER VIRAL DISEASES: ICD-10-CM

## 2020-08-21 ENCOUNTER — OUTPATIENT (OUTPATIENT)
Dept: OUTPATIENT SERVICES | Facility: HOSPITAL | Age: 75
LOS: 1 days | Discharge: HOME | End: 2020-08-21
Payer: MEDICARE

## 2020-08-21 VITALS
HEART RATE: 70 BPM | OXYGEN SATURATION: 100 % | TEMPERATURE: 98 F | RESPIRATION RATE: 18 BRPM | WEIGHT: 123.02 LBS | SYSTOLIC BLOOD PRESSURE: 187 MMHG | DIASTOLIC BLOOD PRESSURE: 78 MMHG | HEIGHT: 63 IN

## 2020-08-21 VITALS
SYSTOLIC BLOOD PRESSURE: 187 MMHG | RESPIRATION RATE: 18 BRPM | DIASTOLIC BLOOD PRESSURE: 78 MMHG | WEIGHT: 123.02 LBS | HEIGHT: 63 IN | TEMPERATURE: 98 F | HEART RATE: 70 BPM | OXYGEN SATURATION: 100 %

## 2020-08-21 DIAGNOSIS — Z96.642 PRESENCE OF LEFT ARTIFICIAL HIP JOINT: Chronic | ICD-10-CM

## 2020-08-21 DIAGNOSIS — Z96.643 PRESENCE OF ARTIFICIAL HIP JOINT, BILATERAL: Chronic | ICD-10-CM

## 2020-08-21 DIAGNOSIS — I27.20 PULMONARY HYPERTENSION, UNSPECIFIED: ICD-10-CM

## 2020-08-21 DIAGNOSIS — Z90.13 ACQUIRED ABSENCE OF BILATERAL BREASTS AND NIPPLES: Chronic | ICD-10-CM

## 2020-08-21 DIAGNOSIS — I34.0 NONRHEUMATIC MITRAL (VALVE) INSUFFICIENCY: ICD-10-CM

## 2020-08-21 PROCEDURE — 93312 ECHO TRANSESOPHAGEAL: CPT | Mod: 26

## 2020-08-21 PROCEDURE — 93325 DOPPLER ECHO COLOR FLOW MAPG: CPT | Mod: 26

## 2020-08-21 PROCEDURE — 93320 DOPPLER ECHO COMPLETE: CPT | Mod: 26

## 2020-08-21 RX ORDER — ASPIRIN/CALCIUM CARB/MAGNESIUM 324 MG
1 TABLET ORAL
Qty: 0 | Refills: 0 | DISCHARGE

## 2020-08-21 NOTE — H&P CARDIOLOGY - HISTORY OF PRESENT ILLNESS
75 female presenting for a routine TOM to evaluate for fibroelastoma. 75 female presenting for a routine TOM to evaluate for fibroelastoma and MV.

## 2020-08-21 NOTE — ASU PATIENT PROFILE, ADULT - ACCEPTABLE
----- Message from Rosanna Smith MD sent at 9/6/2019  8:08 AM CDT -----  TSH normal, continue current dose of levothyroxine 0

## 2020-08-21 NOTE — CHART NOTE - NSCHARTNOTEFT_GEN_A_CORE
PACU ANESTHESIA ADMISSION NOTE      Procedure:   Post op diagnosis:      ____  Intubated  TV:______       Rate: ______      FiO2: ______    __x__  Patent Airway    __x__  Full return of protective reflexes    __x__  Full recovery from anesthesia / back to baseline status    Vitals:  T(C): 36.8 (08-21-20 @ 12:00), Max: 36.8 (08-21-20 @ 10:51)  HR: 70 (08-21-20 @ 12:00) (70 - 70)  BP: 187/78 (08-21-20 @ 12:00) (187/78 - 187/78)  RR: 18 (08-21-20 @ 12:00) (18 - 18)  SpO2: 100% (08-21-20 @ 12:00) (100% - 100%)    Mental Status:  __x__ Awake   ___x__ Alert   _____ Drowsy   _____ Sedated    Nausea/Vomiting:  __x__ NO  ______Yes,   See Post - Op Orders          Pain Scale (0-10):  __0___    Treatment: ____ None    __x__ See Post - Op/PCA Orders    Post - Operative Fluids:   ____ Oral   __x__ See Post - Op Orders    Plan: Discharge:   __x__Home       _____Floor     _____Critical Care    _____  Other:_________________    Comments: Patient had smooth intraoperative event, no anesthesia complication.  PACU Vital signs: HR:    68        BP:     116   /     74     RR:     18        O2 Sat: 100      %     Temp

## 2020-08-21 NOTE — CHART NOTE - NSCHARTNOTEFT_GEN_A_CORE
POST OPERATIVE PROCEDURAL DOCUMENTATION  PRE-OP DIAGNOSIS: Fibroelastoma     POST-OP DIAGNOSIS: Fibroelastoma     PROCEDURE: Transesophageal echocardiogram    Primary Physician: Dr Lim  Assistant: Jaye     ANESTHESIA TYPE  [  ] General Anesthesia  [ x ] Conscious Sedation  [  ] Local/Regional    CONDITION  [  ] Critical  [  ] Serious  [  ] Fair  [x] Good    SPECIMENS REMOVED (IF APPLICABLE): N/A    IMPLANTS (IF APPLICABLE): None    ESTIMATED BLOOD LOSS: None    COMPLICATIONS: None      FINDINGS:    After risks and benefits of procedures were explained, informed consent was obtained and placed in chart. Refer to Anesthesia note for sedation details.  The TOM probe was passed into the esophagus without difficulty.  Transesophageal and transgastric images were obtained.  The TOM probe was removed without difficulty and examined.  There was no evidence for bleeding.  The patient tolerated the procedure well without any immediate TOM-related complications.      Preliminary Findings:  MARCELLA: Left atrial appendage was clear of clot and smoke.  LV: LVEF was estimated at 55-60%   MV: Calcified leaflets. Mild to moderate MR, No evidence of MS.   AV: Sclerotic with normal opening, trace AI, no evidence of AS. There was evidence of a fibroelastoma on the RCC measuring approx. 1.2 cm.   TV: moderate TR.   IAS: no PFO. No R-> L shunt by color doppler and bubbles.   There was mild, non-mobile atheroma seen in the thoracic aorta.     DIAGNOSIS/IMPRESSION:  Fibroelastoma on AV stable in size since last TOM from June 2019    PLAN OF CARE:  D/C home when stable

## 2020-08-28 DIAGNOSIS — R22.2 LOCALIZED SWELLING, MASS AND LUMP, TRUNK: ICD-10-CM

## 2020-10-08 ENCOUNTER — OUTPATIENT (OUTPATIENT)
Dept: OUTPATIENT SERVICES | Facility: HOSPITAL | Age: 75
LOS: 1 days | Discharge: HOME | End: 2020-10-08

## 2020-10-08 DIAGNOSIS — Z96.642 PRESENCE OF LEFT ARTIFICIAL HIP JOINT: Chronic | ICD-10-CM

## 2020-10-08 DIAGNOSIS — Z90.13 ACQUIRED ABSENCE OF BILATERAL BREASTS AND NIPPLES: Chronic | ICD-10-CM

## 2020-10-08 DIAGNOSIS — I89.0 LYMPHEDEMA, NOT ELSEWHERE CLASSIFIED: ICD-10-CM

## 2020-10-08 DIAGNOSIS — Z96.643 PRESENCE OF ARTIFICIAL HIP JOINT, BILATERAL: Chronic | ICD-10-CM

## 2021-04-27 ENCOUNTER — TRANSCRIPTION ENCOUNTER (OUTPATIENT)
Age: 76
End: 2021-04-27

## 2021-04-27 ENCOUNTER — INPATIENT (INPATIENT)
Facility: HOSPITAL | Age: 76
LOS: 0 days | Discharge: HOME | End: 2021-04-28
Attending: HOSPITALIST | Admitting: HOSPITALIST
Payer: MEDICARE

## 2021-04-27 ENCOUNTER — EMERGENCY (EMERGENCY)
Facility: HOSPITAL | Age: 76
LOS: 0 days | Discharge: HOME | End: 2021-04-27
Admitting: HOSPITALIST

## 2021-04-27 VITALS
SYSTOLIC BLOOD PRESSURE: 190 MMHG | RESPIRATION RATE: 18 BRPM | TEMPERATURE: 97 F | OXYGEN SATURATION: 100 % | HEIGHT: 63 IN | WEIGHT: 125 LBS | DIASTOLIC BLOOD PRESSURE: 87 MMHG | HEART RATE: 87 BPM

## 2021-04-27 DIAGNOSIS — Z90.13 ACQUIRED ABSENCE OF BILATERAL BREASTS AND NIPPLES: Chronic | ICD-10-CM

## 2021-04-27 DIAGNOSIS — Z96.642 PRESENCE OF LEFT ARTIFICIAL HIP JOINT: Chronic | ICD-10-CM

## 2021-04-27 DIAGNOSIS — Z96.643 PRESENCE OF ARTIFICIAL HIP JOINT, BILATERAL: Chronic | ICD-10-CM

## 2021-04-27 DIAGNOSIS — Z90.10 ACQUIRED ABSENCE OF UNSPECIFIED BREAST AND NIPPLE: ICD-10-CM

## 2021-04-27 DIAGNOSIS — Z85.3 PERSONAL HISTORY OF MALIGNANT NEOPLASM OF BREAST: ICD-10-CM

## 2021-04-27 DIAGNOSIS — R42 DIZZINESS AND GIDDINESS: ICD-10-CM

## 2021-04-27 DIAGNOSIS — R94.31 ABNORMAL ELECTROCARDIOGRAM [ECG] [EKG]: ICD-10-CM

## 2021-04-27 DIAGNOSIS — I10 ESSENTIAL (PRIMARY) HYPERTENSION: ICD-10-CM

## 2021-04-27 DIAGNOSIS — I48.91 UNSPECIFIED ATRIAL FIBRILLATION: ICD-10-CM

## 2021-04-27 LAB
ALBUMIN SERPL ELPH-MCNC: 4.2 G/DL — SIGNIFICANT CHANGE UP (ref 3.5–5.2)
ALP SERPL-CCNC: 43 U/L — SIGNIFICANT CHANGE UP (ref 30–115)
ALT FLD-CCNC: 20 U/L — SIGNIFICANT CHANGE UP (ref 0–41)
ANION GAP SERPL CALC-SCNC: 9 MMOL/L — SIGNIFICANT CHANGE UP (ref 7–14)
AST SERPL-CCNC: 58 U/L — HIGH (ref 0–41)
BASOPHILS # BLD AUTO: 0.04 K/UL — SIGNIFICANT CHANGE UP (ref 0–0.2)
BASOPHILS NFR BLD AUTO: 0.5 % — SIGNIFICANT CHANGE UP (ref 0–1)
BILIRUB SERPL-MCNC: 0.6 MG/DL — SIGNIFICANT CHANGE UP (ref 0.2–1.2)
BUN SERPL-MCNC: 15 MG/DL — SIGNIFICANT CHANGE UP (ref 10–20)
CALCIUM SERPL-MCNC: 10 MG/DL — SIGNIFICANT CHANGE UP (ref 8.5–10.1)
CHLORIDE SERPL-SCNC: 103 MMOL/L — SIGNIFICANT CHANGE UP (ref 98–110)
CO2 SERPL-SCNC: 24 MMOL/L — SIGNIFICANT CHANGE UP (ref 17–32)
CREAT SERPL-MCNC: 0.7 MG/DL — SIGNIFICANT CHANGE UP (ref 0.7–1.5)
EOSINOPHIL # BLD AUTO: 0.04 K/UL — SIGNIFICANT CHANGE UP (ref 0–0.7)
EOSINOPHIL NFR BLD AUTO: 0.5 % — SIGNIFICANT CHANGE UP (ref 0–8)
GLUCOSE SERPL-MCNC: 99 MG/DL — SIGNIFICANT CHANGE UP (ref 70–99)
HCT VFR BLD CALC: 40.7 % — SIGNIFICANT CHANGE UP (ref 37–47)
HGB BLD-MCNC: 13.4 G/DL — SIGNIFICANT CHANGE UP (ref 12–16)
IMM GRANULOCYTES NFR BLD AUTO: 0.5 % — HIGH (ref 0.1–0.3)
LYMPHOCYTES # BLD AUTO: 0.81 K/UL — LOW (ref 1.2–3.4)
LYMPHOCYTES # BLD AUTO: 10.6 % — LOW (ref 20.5–51.1)
MAGNESIUM SERPL-MCNC: 2.1 MG/DL — SIGNIFICANT CHANGE UP (ref 1.8–2.4)
MCHC RBC-ENTMCNC: 30.5 PG — SIGNIFICANT CHANGE UP (ref 27–31)
MCHC RBC-ENTMCNC: 32.9 G/DL — SIGNIFICANT CHANGE UP (ref 32–37)
MCV RBC AUTO: 92.5 FL — SIGNIFICANT CHANGE UP (ref 81–99)
MONOCYTES # BLD AUTO: 0.35 K/UL — SIGNIFICANT CHANGE UP (ref 0.1–0.6)
MONOCYTES NFR BLD AUTO: 4.6 % — SIGNIFICANT CHANGE UP (ref 1.7–9.3)
NEUTROPHILS # BLD AUTO: 6.37 K/UL — SIGNIFICANT CHANGE UP (ref 1.4–6.5)
NEUTROPHILS NFR BLD AUTO: 83.3 % — HIGH (ref 42.2–75.2)
NRBC # BLD: 0 /100 WBCS — SIGNIFICANT CHANGE UP (ref 0–0)
NT-PROBNP SERPL-SCNC: 498 PG/ML — HIGH (ref 0–300)
PLATELET # BLD AUTO: 218 K/UL — SIGNIFICANT CHANGE UP (ref 130–400)
POTASSIUM SERPL-MCNC: 6.6 MMOL/L — CRITICAL HIGH (ref 3.5–5)
POTASSIUM SERPL-SCNC: 6.6 MMOL/L — CRITICAL HIGH (ref 3.5–5)
PROT SERPL-MCNC: 7.5 G/DL — SIGNIFICANT CHANGE UP (ref 6–8)
RAPID RVP RESULT: SIGNIFICANT CHANGE UP
RBC # BLD: 4.4 M/UL — SIGNIFICANT CHANGE UP (ref 4.2–5.4)
RBC # FLD: 12.2 % — SIGNIFICANT CHANGE UP (ref 11.5–14.5)
SARS-COV-2 RNA SPEC QL NAA+PROBE: SIGNIFICANT CHANGE UP
SODIUM SERPL-SCNC: 136 MMOL/L — SIGNIFICANT CHANGE UP (ref 135–146)
TROPONIN T SERPL-MCNC: <0.01 NG/ML — SIGNIFICANT CHANGE UP
WBC # BLD: 7.65 K/UL — SIGNIFICANT CHANGE UP (ref 4.8–10.8)
WBC # FLD AUTO: 7.65 K/UL — SIGNIFICANT CHANGE UP (ref 4.8–10.8)

## 2021-04-27 PROCEDURE — 99223 1ST HOSP IP/OBS HIGH 75: CPT

## 2021-04-27 PROCEDURE — 71045 X-RAY EXAM CHEST 1 VIEW: CPT | Mod: 26

## 2021-04-27 PROCEDURE — 99285 EMERGENCY DEPT VISIT HI MDM: CPT | Mod: CS

## 2021-04-27 RX ORDER — SODIUM CHLORIDE 9 MG/ML
1000 INJECTION, SOLUTION INTRAVENOUS ONCE
Refills: 0 | Status: COMPLETED | OUTPATIENT
Start: 2021-04-27 | End: 2021-04-27

## 2021-04-27 RX ORDER — SENNA PLUS 8.6 MG/1
2 TABLET ORAL AT BEDTIME
Refills: 0 | Status: DISCONTINUED | OUTPATIENT
Start: 2021-04-27 | End: 2021-04-28

## 2021-04-27 RX ORDER — ACETAMINOPHEN 500 MG
650 TABLET ORAL EVERY 4 HOURS
Refills: 0 | Status: DISCONTINUED | OUTPATIENT
Start: 2021-04-27 | End: 2021-04-28

## 2021-04-27 RX ORDER — SODIUM CHLORIDE 9 MG/ML
1000 INJECTION INTRAMUSCULAR; INTRAVENOUS; SUBCUTANEOUS
Refills: 0 | Status: DISCONTINUED | OUTPATIENT
Start: 2021-04-27 | End: 2021-04-28

## 2021-04-27 RX ORDER — ATENOLOL 25 MG/1
25 TABLET ORAL AT BEDTIME
Refills: 0 | Status: DISCONTINUED | OUTPATIENT
Start: 2021-04-27 | End: 2021-04-28

## 2021-04-27 RX ORDER — ONDANSETRON 8 MG/1
4 TABLET, FILM COATED ORAL EVERY 6 HOURS
Refills: 0 | Status: DISCONTINUED | OUTPATIENT
Start: 2021-04-27 | End: 2021-04-28

## 2021-04-27 RX ORDER — MECLIZINE HCL 12.5 MG
25 TABLET ORAL THREE TIMES A DAY
Refills: 0 | Status: DISCONTINUED | OUTPATIENT
Start: 2021-04-27 | End: 2021-04-28

## 2021-04-27 RX ORDER — ENOXAPARIN SODIUM 100 MG/ML
40 INJECTION SUBCUTANEOUS DAILY
Refills: 0 | Status: DISCONTINUED | OUTPATIENT
Start: 2021-04-27 | End: 2021-04-28

## 2021-04-27 RX ORDER — ASPIRIN/CALCIUM CARB/MAGNESIUM 324 MG
325 TABLET ORAL ONCE
Refills: 0 | Status: COMPLETED | OUTPATIENT
Start: 2021-04-27 | End: 2021-04-27

## 2021-04-27 RX ADMIN — SENNA PLUS 2 TABLET(S): 8.6 TABLET ORAL at 21:36

## 2021-04-27 RX ADMIN — SODIUM CHLORIDE 1000 MILLILITER(S): 9 INJECTION, SOLUTION INTRAVENOUS at 16:25

## 2021-04-27 RX ADMIN — ATENOLOL 25 MILLIGRAM(S): 25 TABLET ORAL at 21:36

## 2021-04-27 RX ADMIN — Medication 325 MILLIGRAM(S): at 15:47

## 2021-04-27 NOTE — ED PROVIDER NOTE - PHYSICAL EXAMINATION
Physical Exam    Vital Signs: I have reviewed the initial vital signs.  Constitutional: well-nourished, appears stated age, no acute distress  Eyes: Conjunctiva pink, Sclera clear, PERRLA, EOMI, no nystagmus.   Cardiovascular: S1 and S2, regular rate, regular rhythm, well-perfused extremities, radial pulses equal and 2+, pedal pulses 2+ and equal   Respiratory: unlabored respiratory effort, clear to auscultation bilaterally no wheezing, rales and rhonchi  Gastrointestinal: soft, non-tender abdomen, no pulsatile mass, normal bowl sounds  Musculoskeletal: supple neck, no lower extremity edema, no midline tenderness  Integumentary: warm, dry, no rash  Neurologic: awake, alert, cranial nerves II-XII grossly intact, extremities’ motor and sensory functions grossly intact, no pronator drift, normal gait, normal speech.

## 2021-04-27 NOTE — ED PROVIDER NOTE - NS_ATTENDINGSCRIBE_ED_ALL_ED
negative
I personally performed the service described in the documentation recorded by the scribe in my presence, and it accurately and completely records my words and actions.

## 2021-04-27 NOTE — H&P ADULT - NSHPLABSRESULTS_GEN_ALL_CORE
LABS:  cret                        13.4   7.65  )-----------( 218      ( 27 Apr 2021 12:56 )             40.7     04-27    136  |  103  |  15  ----------------------------<  99  6.6<HH>   |  24  |  0.7    Ca    10.0      27 Apr 2021 12:56  Mg     2.1     04-27    TPro  7.5  /  Alb  4.2  /  TBili  0.6  /  DBili  x   /  AST  58<H>  /  ALT  20  /  AlkPhos  43  04-27            EXAM:  XR CHEST PORTABLE IMMED 1V          PROCEDURE DATE:  04/27/2021      INTERPRETATION:  Clinical History / Reason for exam: Lightheadedness    Comparison : Chest radiograph None.    Technique/Positioning: Frontal.    Findings:    Support devices: None.    Cardiac/mediastinum/hilum: Unremarkable.    Lung parenchyma/Pleura: Right basilar opacity.    Skeleton/soft tissues: Scoliosis. Right and left axillary surgical clips.    Impression:    Right basilar opacity          MAHAD DIAZ; Attending Radiologist  This document has been electronically signed. Apr 27 2021  1:11PM

## 2021-04-27 NOTE — ED PROVIDER NOTE - NS ED ROS FT
Constitutional: (-) fever, (-) chills  Eyes: (-) visual changes  ENT: (-) nasal congestions  Cardiovascular: (-) chest pain, (-) syncope  Respiratory: (-) cough, (-) shortness of breath, (-) dyspnea,   Gastrointestinal: (-) vomiting, (-) diarrhea, (-)nausea,  Musculoskeletal: (-) neck pain, (-) back pain, (-) joint pain,  Integumentary: (-) rash, (-) edema, (-) bruises  Neurological: (-) headache, (-) loc, (-) dizziness, (-) tingling, (-)numbness, (+) lightheadedness  Peripheral Vascular: (-) leg swelling  :  (-)dysuria,  (-) hematuria  Allergic/Immunologic: (-) pruritus

## 2021-04-27 NOTE — ED PROVIDER NOTE - PROGRESS NOTE DETAILS
Authored by Dr. Luis Harris: Pt with nausea, lightheadedness [place] [facility] [place] wave inversions in V1-V3 (scanned into patients MR). EKG here with no T wave inversions. Unremarkable exam. Admit for further eval in the setting of lightheadedness nausea and new EKG changed. right sided opacity on xray pt has no sx's of cough, fever ATTENDING NOTE: 74 y/o F PMH HTN, Fibroelastoma, b/l mastectomy here for evaluation lightheadedness. Pt states that the symptoms started this morning associated with nausea. [place] [facility] [place] found to have anterior lead T wave inversions so was sent to the ED. Pt has no CP. reports that she took metamucil and dulcolax in an effort to move her bowels. Pt is unsure if this caused her current symptoms. Impression: Nausea and lightheadedness with EKG abnormality. XR shows R sided opacity but Pt is without symptoms. Due to abnormal EKG, will admit to tele for further evaluation.

## 2021-04-27 NOTE — H&P ADULT - ASSESSMENT
74 y/o F presented to ER with complaints of "room spinning and dizziness" which started this morning and she went to [facility] [place] where was noted to have T wave inversion on EKG and advised to come to ER for eval and treatment. Patient noted to have paroxysmal a fib in chart, but does not report history of arrythmia.    # T wave inversion on EKG  # dizziness, likely positional vertigo  - admit to non-CCU telemetry  - cardiac monitoring  - trend troponins  - TTE  - patient describes dizziness as room spinning, not lightheadedness    # constipation  - patient was talking dulcolax and metamucil for the first time before symptom onset  - will start senna at bedtime and miralax  - hydration    # fibroelastoma   - outpt fu    # HTN  - on atenolol 25 mg daily  - elevated bp in ED, monitor and increase atenolol as appropriate  - patient does report she is anxious about being in the hospital  - dash diet    # s/p bilateral mastectomy  # lymphedema  - stable    diet: regular, dash  VTE ppx: lovenox  GI ppx: not needed  code status: full code  activity: OOB  dispo: acute from home 74 y/o F presented to ER with complaints of "room spinning and dizziness" which started this morning and she went to [facility] [place] where was noted to have T wave inversion on EKG and advised to come to ER for eval and treatment. Patient noted to have paroxysmal a fib in chart, but does not report history of arrythmia.    # T wave inversion on EKG  # dizziness, likely positional vertigo  - admit to non-CCU telemetry  - cardiac monitoring  - trend troponin  - patient describes dizziness as room spinning, not lightheadedness  - meclizine 25 mg tid prn    # constipation  - patient was talking dulcolax and metamucil for the first time before symptom onset  - will start senna at bedtime  - hydration    # hyperkalemia  - hemolyzed specimen  - repeat    # fibroelastoma   - outpt fu    # HTN  - on atenolol 25 mg daily  - elevated bp in ED, monitor and increase atenolol as appropriate  - patient does report she is anxious about being in the hospital  - dash diet    # s/p bilateral mastectomy  # lymphedema  - stable    diet: regular, dash  VTE ppx: lovenox  GI ppx: not needed  code status: full code  activity: OOB  dispo: acute from home 74 y/o F presented to ER with complaints of "room spinning and dizziness" which started this morning and she went to [facility] [place] where was noted to have T wave inversion on EKG and advised to come to ER for eval and treatment. Patient noted to have paroxysmal a fib in chart, but does not report history of arrythmia.    # T wave inversion on EKG  # dizziness, likely positional vertigo  - admit to non-CCU telemetry  - cardiac monitoring  - trend troponin x 3  - patient describes dizziness as room spinning, not lightheadedness, resolved now.  Follow up with ENT as outpatient.  - meclizine 25 mg tid prn    # constipation  - patient was talking dulcolax and metamucil for the first time before symptom onset  - will start senna at bedtime  - hydration    # hyperkalemia  - hemolyzed specimen  - repeat and follow    # fibroelastoma   - outpt fu    # HTN  - on atenolol 25 mg daily  - elevated bp in ED, monitor and increase atenolol as appropriate  - patient does report she is anxious about being in the hospital  - dash diet    # s/p bilateral mastectomy  # lymphedema  - stable    diet: regular, dash  VTE ppx: lovenox  GI ppx: not needed  code status: full code  activity: OOB  dispo: can likely be DCed if troponin neg x 3 and she remains stable.  Advised her to get referral for ENT from her primary.

## 2021-04-27 NOTE — ED PROVIDER NOTE - CLINICAL SUMMARY MEDICAL DECISION MAKING FREE TEXT BOX
Pt with nausea, lightheadedness with EKG in [facility] [place] showing new T wave inversions in V1-V3 (scanned into patients MR). EKG here with no T wave inversions. Unremarkable exam. Admit for further eval in the setting of lightheadedness nausea and new EKG changed.

## 2021-04-27 NOTE — ED ADULT NURSE NOTE - NSIMPLEMENTINTERV_GEN_ALL_ED
Implemented All Fall Risk Interventions:  [place] to call system. Call bell, personal items and telephone within reach. Instruct patient to call for assistance. Room bathroom lighting operational. Non-slip footwear when patient is off stretcher. Physically safe environment: no spills, clutter or unnecessary equipment. Stretcher in lowest position, wheels locked, appropriate side rails in place. Provide visual cue, wrist band, yellow gown, etc. Monitor gait and stability. Monitor for mental status changes and reorient to person, place, and time. Review medications for side effects contributing to fall risk. Reinforce activity limits and safety measures with patient and family.

## 2021-04-27 NOTE — ED ADULT TRIAGE NOTE - CHIEF COMPLAINT QUOTE
as per pt "Saturday, I took ducolax, I have cramps that are making me double over; yesterday I took Metamucil. This morning I am dizzy, nauseous, and want to throw up. [place] [facility] [place] had an abnormal ECG"

## 2021-04-27 NOTE — ED PROVIDER NOTE - OBJECTIVE STATEMENT
76 yo female, pmh of htn on atenolol, fibroelastoma, b/l mastectomy, presents to ed for lightheadedness, pt states started this morning, described as "feeling off" went to [facility] [place] and had t wave inversions on ekg and advised to come to ed for further evaluation. Pt states feels better now, no pain or radiation. Denies dizziness, visual changes, numbness, tingling, cp, sob, le swelling, abd pain, nvd.

## 2021-04-27 NOTE — PATIENT PROFILE ADULT - DOES PATIENT HAVE ADVANCE DIRECTIVE
[FreeTextEntry1] : Patient is a 50 yo male here with complaints of a painful anal skin tag that bleeds.  He states that he has had it for a while and is getting worse.  He states that he does have irritable bowel.  Last colonoscopy was in 2020 and he did have some polyps.  He did see Dr. Vera in the past who banded his hemorrhoids and gave him a cream for the skin tag.
No

## 2021-04-27 NOTE — ED ADULT NURSE NOTE - CHIEF COMPLAINT QUOTE
as per pt "Saturday, I took ducolax, I have cramps that are making me double over; yesterday I took Metamucil. This morning I am dizzy, nauseous, and want to throw up. I went [place] [facility] [place] and they told me I had an abnormal ECG"

## 2021-04-27 NOTE — H&P ADULT - NSICDXPASTMEDICALHX_GEN_ALL_CORE_FT
PAST MEDICAL HISTORY:  AF (paroxysmal atrial fibrillation)     Breast cancer     Hayfever     HTN (hypertension)     Nonrheumatic mitral valve insufficiency     Papillary fibroelastoma of heart

## 2021-04-27 NOTE — H&P ADULT - NSHPPHYSICALEXAM_GEN_ALL_CORE
Vital Signs Last 24 Hrs  T(C): 36.1 (27 Apr 2021 12:29), Max: 36.1 (27 Apr 2021 12:29)  T(F): 96.9 (27 Apr 2021 12:29), Max: 96.9 (27 Apr 2021 12:29)  HR: 68 (27 Apr 2021 16:42) (68 - 87)  BP: 189/90 (27 Apr 2021 16:42) (189/90 - 190/87)  BP(mean): --  RR: 17 (27 Apr 2021 16:42) (17 - 18)  SpO2: 98% (27 Apr 2021 16:42) (98% - 100%)    VITALS:   T(C): 36.1 (04-27-21 @ 12:29), Max: 36.1 (04-27-21 @ 12:29)  HR: 68 (04-27-21 @ 16:42) (68 - 87)  BP: 189/90 (04-27-21 @ 16:42) (189/90 - 190/87)  RR: 17 (04-27-21 @ 16:42) (17 - 18)  SpO2: 98% (04-27-21 @ 16:42) (98% - 100%)    GENERAL: NAD, sititng on side of bed comfortably  HEAD:  Atraumatic, Normocephalic  EYES: EOMI, PERRLA, conjunctiva and sclera clear  ENT: Moist mucous membranes  NECK: Supple, No JVD  CHEST/LUNG: Clear to auscultation bilaterally; No rales, rhonchi, wheezing, or rubs. Unlabored respirations  HEART: Regular rate and rhythm; No murmurs, rubs, or gallops  ABDOMEN: Bowel sounds present; Soft, Nontender, Nondistended. No hepatomegally  EXTREMITIES:  2+ Peripheral Pulses, brisk capillary refill. No clubbing, cyanosis, or edema  NERVOUS SYSTEM:  Alert & Oriented X3, speech clear. No deficits   MSK: FROM all 4 extremities, full and equal strength  SKIN: No rashes or lesions Vital Signs Last 24 Hrs  T(C): 36.1 (27 Apr 2021 12:29), Max: 36.1 (27 Apr 2021 12:29)  T(F): 96.9 (27 Apr 2021 12:29), Max: 96.9 (27 Apr 2021 12:29)  HR: 68 (27 Apr 2021 16:42) (68 - 87)  BP: 189/90 (27 Apr 2021 16:42) (189/90 - 190/87)  BP(mean): --  RR: 17 (27 Apr 2021 16:42) (17 - 18)  SpO2: 98% (27 Apr 2021 16:42) (98% - 100%)    VITALS:   T(C): 36.1 (04-27-21 @ 12:29), Max: 36.1 (04-27-21 @ 12:29)  HR: 68 (04-27-21 @ 16:42) (68 - 87)  BP: 189/90 (04-27-21 @ 16:42) (189/90 - 190/87)  RR: 17 (04-27-21 @ 16:42) (17 - 18)  SpO2: 98% (04-27-21 @ 16:42) (98% - 100%)    GENERAL: NAD, sititng on side of bed comfortably  HEAD:  Atraumatic, Normocephalic  EYES: EOMI, PERRLA, conjunctiva and sclera clear  ENT: Moist mucous membranes  NECK: Supple, No JVD  CHEST/LUNG: Clear to auscultation bilaterally; No rales, rhonchi, wheezing, or rubs. Unlabored respirations  HEART: Regular rate and rhythm; No murmurs, rubs, or gallops  ABDOMEN: Bowel sounds present; Soft, Nontender, Nondistended. No hepatomegally  EXTREMITIES:  2+ Peripheral Pulses, brisk capillary refill. No clubbing, cyanosis, or edema  NERVOUS SYSTEM:  Alert & Oriented X3, speech clear. No deficits. No nystagmus.   MSK: FROM all 4 extremities, full and equal strength  SKIN: No rashes or lesions

## 2021-04-27 NOTE — H&P ADULT - NSICDXPASTSURGICALHX_GEN_ALL_CORE_FT
PAST SURGICAL HISTORY:  H/O bilateral mastectomy BILAT LYMPHEDEMA    H/O total hip arthroplasty, left     History of total hip replacement, bilateral

## 2021-04-27 NOTE — H&P ADULT - HISTORY OF PRESENT ILLNESS
74 yo female, pmh of htn on atenolol, fibroelastoma, b/l mastectomy causing b/l UE lymphedema, presents to ER for lightheadedness which pt states started this morning, described as "room spinning". States she felt nauseous but did not vomit. Pt was constipated therefore pt opted to trial metamucil and and dulcolax prior to her feeling lightheaded. Pt went to [facility] and had T wave inversions on ekg and advised to come to ED for further evaluation. States her dizziness has improved with her walking to the ER bathroom with no complications.  Pt states feels better now, no pain or radiation. Pt visited [facility] in [place] in 2/2021 for unrelated issue at which time her CXR showed right sided opacities for which no treatment was advised. Today, CXR redemonstrated right side opacity. Denies dizziness, visual changes, numbness, tingling, cp, sob, le swelling, abd pain, nvd.

## 2021-04-28 ENCOUNTER — TRANSCRIPTION ENCOUNTER (OUTPATIENT)
Age: 76
End: 2021-04-28

## 2021-04-28 VITALS
DIASTOLIC BLOOD PRESSURE: 61 MMHG | SYSTOLIC BLOOD PRESSURE: 134 MMHG | HEART RATE: 59 BPM | RESPIRATION RATE: 16 BRPM | TEMPERATURE: 98 F

## 2021-04-28 LAB
ALBUMIN SERPL ELPH-MCNC: 3.7 G/DL — SIGNIFICANT CHANGE UP (ref 3.5–5.2)
ALP SERPL-CCNC: 48 U/L — SIGNIFICANT CHANGE UP (ref 30–115)
ALT FLD-CCNC: 15 U/L — SIGNIFICANT CHANGE UP (ref 0–41)
ANION GAP SERPL CALC-SCNC: 7 MMOL/L — SIGNIFICANT CHANGE UP (ref 7–14)
ANION GAP SERPL CALC-SCNC: 8 MMOL/L — SIGNIFICANT CHANGE UP (ref 7–14)
AST SERPL-CCNC: 23 U/L — SIGNIFICANT CHANGE UP (ref 0–41)
BASOPHILS # BLD AUTO: 0.03 K/UL — SIGNIFICANT CHANGE UP (ref 0–0.2)
BASOPHILS NFR BLD AUTO: 0.7 % — SIGNIFICANT CHANGE UP (ref 0–1)
BILIRUB SERPL-MCNC: 0.4 MG/DL — SIGNIFICANT CHANGE UP (ref 0.2–1.2)
BUN SERPL-MCNC: 14 MG/DL — SIGNIFICANT CHANGE UP (ref 10–20)
BUN SERPL-MCNC: 16 MG/DL — SIGNIFICANT CHANGE UP (ref 10–20)
CALCIUM SERPL-MCNC: 9.6 MG/DL — SIGNIFICANT CHANGE UP (ref 8.5–10.1)
CALCIUM SERPL-MCNC: 9.6 MG/DL — SIGNIFICANT CHANGE UP (ref 8.5–10.1)
CHLORIDE SERPL-SCNC: 106 MMOL/L — SIGNIFICANT CHANGE UP (ref 98–110)
CHLORIDE SERPL-SCNC: 106 MMOL/L — SIGNIFICANT CHANGE UP (ref 98–110)
CO2 SERPL-SCNC: 27 MMOL/L — SIGNIFICANT CHANGE UP (ref 17–32)
CO2 SERPL-SCNC: 27 MMOL/L — SIGNIFICANT CHANGE UP (ref 17–32)
CREAT SERPL-MCNC: 0.7 MG/DL — SIGNIFICANT CHANGE UP (ref 0.7–1.5)
CREAT SERPL-MCNC: 0.7 MG/DL — SIGNIFICANT CHANGE UP (ref 0.7–1.5)
EOSINOPHIL # BLD AUTO: 0.24 K/UL — SIGNIFICANT CHANGE UP (ref 0–0.7)
EOSINOPHIL NFR BLD AUTO: 5.3 % — SIGNIFICANT CHANGE UP (ref 0–8)
GLUCOSE SERPL-MCNC: 91 MG/DL — SIGNIFICANT CHANGE UP (ref 70–99)
GLUCOSE SERPL-MCNC: 95 MG/DL — SIGNIFICANT CHANGE UP (ref 70–99)
HCT VFR BLD CALC: 35.3 % — LOW (ref 37–47)
HCV AB S/CO SERPL IA: 0.04 COI — SIGNIFICANT CHANGE UP
HCV AB SERPL-IMP: SIGNIFICANT CHANGE UP
HGB BLD-MCNC: 11.6 G/DL — LOW (ref 12–16)
IMM GRANULOCYTES NFR BLD AUTO: 0 % — LOW (ref 0.1–0.3)
LYMPHOCYTES # BLD AUTO: 1.51 K/UL — SIGNIFICANT CHANGE UP (ref 1.2–3.4)
LYMPHOCYTES # BLD AUTO: 33.4 % — SIGNIFICANT CHANGE UP (ref 20.5–51.1)
MCHC RBC-ENTMCNC: 30.8 PG — SIGNIFICANT CHANGE UP (ref 27–31)
MCHC RBC-ENTMCNC: 32.9 G/DL — SIGNIFICANT CHANGE UP (ref 32–37)
MCV RBC AUTO: 93.6 FL — SIGNIFICANT CHANGE UP (ref 81–99)
MONOCYTES # BLD AUTO: 0.5 K/UL — SIGNIFICANT CHANGE UP (ref 0.1–0.6)
MONOCYTES NFR BLD AUTO: 11.1 % — HIGH (ref 1.7–9.3)
NEUTROPHILS # BLD AUTO: 2.24 K/UL — SIGNIFICANT CHANGE UP (ref 1.4–6.5)
NEUTROPHILS NFR BLD AUTO: 49.5 % — SIGNIFICANT CHANGE UP (ref 42.2–75.2)
NRBC # BLD: 0 /100 WBCS — SIGNIFICANT CHANGE UP (ref 0–0)
PLATELET # BLD AUTO: 194 K/UL — SIGNIFICANT CHANGE UP (ref 130–400)
POTASSIUM SERPL-MCNC: 3.7 MMOL/L — SIGNIFICANT CHANGE UP (ref 3.5–5)
POTASSIUM SERPL-MCNC: 3.9 MMOL/L — SIGNIFICANT CHANGE UP (ref 3.5–5)
POTASSIUM SERPL-SCNC: 3.7 MMOL/L — SIGNIFICANT CHANGE UP (ref 3.5–5)
POTASSIUM SERPL-SCNC: 3.9 MMOL/L — SIGNIFICANT CHANGE UP (ref 3.5–5)
PROT SERPL-MCNC: 6.5 G/DL — SIGNIFICANT CHANGE UP (ref 6–8)
RBC # BLD: 3.77 M/UL — LOW (ref 4.2–5.4)
RBC # FLD: 12.1 % — SIGNIFICANT CHANGE UP (ref 11.5–14.5)
SODIUM SERPL-SCNC: 140 MMOL/L — SIGNIFICANT CHANGE UP (ref 135–146)
SODIUM SERPL-SCNC: 141 MMOL/L — SIGNIFICANT CHANGE UP (ref 135–146)
TROPONIN T SERPL-MCNC: <0.01 NG/ML — SIGNIFICANT CHANGE UP
WBC # BLD: 4.52 K/UL — LOW (ref 4.8–10.8)
WBC # FLD AUTO: 4.52 K/UL — LOW (ref 4.8–10.8)

## 2021-04-28 PROCEDURE — 99238 HOSP IP/OBS DSCHRG MGMT 30/<: CPT

## 2021-04-28 RX ORDER — SENNA PLUS 8.6 MG/1
2 TABLET ORAL
Qty: 60 | Refills: 0
Start: 2021-04-28 | End: 2021-05-27

## 2021-04-28 RX ORDER — ACETAMINOPHEN 500 MG
2 TABLET ORAL
Qty: 0 | Refills: 0 | DISCHARGE
Start: 2021-04-28

## 2021-04-28 NOTE — DISCHARGE NOTE NURSING/CASE MANAGEMENT/SOCIAL WORK - PATIENT PORTAL LINK FT
You can access the FollowMyHealth Patient Portal offered by [facility] by registering at the following website: [email or web address]. By joining [facility]’s FollowMyHealth portal, you will also be able to view your health information using other applications (apps) compatible with our system.

## 2021-04-28 NOTE — DISCHARGE NOTE PROVIDER - CARE PROVIDER_API CALL
Madisyn Sierra  [id number]  [address] [place] [address]  Phone: [phone]  Fax: [phone]  Follow Up Time:     Kobe Quiles)  Cardiovascular Disease; Internal Medicine; Nuclear Cardiology  [address] [place] [address], [facility] [address]  [place] [address]  Phone: [phone]  Fax: [phone]  Follow Up Time:

## 2021-04-28 NOTE — DISCHARGE NOTE PROVIDER - HOSPITAL COURSE
75F w/ PMHx of htn on atenolol, fibroelastoma, b/l mastectomy causing b/l UE lymphedema, presents to ER for lightheadedness which pt states started this morning, described as "room spinning".  [place] [facility] [place] which showed T-wave inversion and patient sent to ED, subsequently admitted.   Patient admitted to telemetry and monitoring revealed no arrhythmias.  Tt was trended and remained negative.  EKG did not reveal any abnormalities or T-wave inversion seen [place] [facility]  Labs remained stable.  Patient admitted to taking multiple types of laxatives 2/2 constipation after which she began having diarrhea and felt she may have been dehydrated which caused her symptoms.  Patient improved and was instructed to take a daily ASA and will need outpatient FU with cardiology in the next 1-2 weeks.  An Rx was sent for Senna for constipation.  Home medications remained the same. 75F w/ PMHx of htn on atenolol, fibroelastoma, b/l mastectomy causing b/l UE lymphedema, presents to ER for lightheadedness which pt states started this morning, described as "room spinning".  [place] [facility] [place] which showed possible T-wave inversion and patient sent to ED, subsequently admitted.   Patient admitted to telemetry and monitoring revealed no arrhythmias.  Tt was trended and remained negative.  EKG did not reveal any abnormalities or T-wave inversion [place] [facility]  Labs remained stable.  Patient admitted to taking multiple types of laxatives 2/2 constipation after which she began having diarrhea and felt she may have been dehydrated which caused her symptoms.  Patient improved and was instructed to take a daily ASA and will need outpatient FU with cardiology in the next 1-2 weeks.  An Rx was sent for Senna for constipation.  Home medications remained the same.        Patient was seen and examined by myself.  Vital Signs Last 24 Hrs  T(C): 36.4 (28 Apr 2021 05:21), Max: 36.4 (28 Apr 2021 05:21)  T(F): 97.6 (28 Apr 2021 05:21), Max: 97.6 (28 Apr 2021 05:21)  HR: 59 (28 Apr 2021 05:21) (59 - 701)  BP: 134/61 (28 Apr 2021 05:21) (134/61 - 198/76)  BP(mean): --  RR: 16 (28 Apr 2021 05:21) (16 - 18)  SpO2: 99% (27 Apr 2021 17:54) (98% - 100%)  GENERAL: NAD, pleasant   HEAD:  Atraumatic, Normocephalic  EYES: EOMI, PERRLA, conjunctiva and sclera clear  ENT: Moist mucous membranes  NECK: Supple, No JVD  CHEST/LUNG: Clear to auscultation bilaterally; no w/r/r  HEART: Regular rate and rhythm; No murmurs, rubs, or gallops, no LE edema  ABDOMEN: Bowel sounds present; Soft, Nontender, Nondistended  EXTREMITIES:  2+ Peripheral Pulses, brisk capillary refill. No clubbing, cyanosis, or edema  NERVOUS SYSTEM:  Alert & Oriented X3, speech clear. No deficits. No nystagmus.

## 2021-04-28 NOTE — CHART NOTE - NSCHARTNOTEFT_GEN_A_CORE
Called by medicine attending, patient stable for discharge.  Tt negative, no EKG changes or T-wave inversion.  No need for Cards c/s.  c/w home medications, add ASA 81mg Qd, send Rx for senna.  Explained importance of outpatient FU with Cardiology, Dr. Alfredo in the next 1-2 weeks.  Return instructions given.

## 2021-04-28 NOTE — DISCHARGE NOTE PROVIDER - NSDCMRMEDTOKEN_GEN_ALL_CORE_FT
acetaminophen 325 mg oral tablet: 2 tab(s) orally every 4 hours, As needed, Mild Pain (1 - 3)  aspirin 81 mg oral tablet, chewable: 1 tab(s) orally once a day  atenolol 25 mg oral tablet: 1 tab(s) orally once a day  senna oral tablet: 2 tab(s) orally once a day (at bedtime)

## 2021-04-28 NOTE — DISCHARGE NOTE PROVIDER - NSDCCPCAREPLAN_GEN_ALL_CORE_FT
PRINCIPAL DISCHARGE DIAGNOSIS  Diagnosis: Lightheadedness  Assessment and Plan of Treatment: This was liekly because you had diarrhea and were dehydrated.  Make sure to drink plenty of water and do not take too many laxatives.  Make sure to FU with your PMD and Cardiologist on discharge      SECONDARY DISCHARGE DIAGNOSES  Diagnosis: Abnormal EKG  Assessment and Plan of Treatment: Your EKG here was normal, make sure to follow up with your cardiologist in 1-2 weeks.

## 2021-04-29 LAB
COVID-19 SPIKE DOMAIN AB INTERP: POSITIVE
COVID-19 SPIKE DOMAIN ANTIBODY RESULT: >250 U/ML — HIGH
SARS-COV-2 IGG+IGM SERPL QL IA: >250 U/ML — HIGH
SARS-COV-2 IGG+IGM SERPL QL IA: POSITIVE

## 2021-05-04 ENCOUNTER — APPOINTMENT (OUTPATIENT)
Dept: CARDIOLOGY | Facility: CLINIC | Age: 76
End: 2021-05-04
Payer: MEDICARE

## 2021-05-04 VITALS
BODY MASS INDEX: 21.79 KG/M2 | HEART RATE: 65 BPM | TEMPERATURE: 96.5 F | DIASTOLIC BLOOD PRESSURE: 68 MMHG | HEIGHT: 63 IN | WEIGHT: 123 LBS | SYSTOLIC BLOOD PRESSURE: 176 MMHG

## 2021-05-04 PROCEDURE — 93000 ELECTROCARDIOGRAM COMPLETE: CPT

## 2021-05-04 PROCEDURE — 99214 OFFICE O/P EST MOD 30 MIN: CPT

## 2021-05-10 ENCOUNTER — APPOINTMENT (OUTPATIENT)
Dept: CARDIOLOGY | Facility: CLINIC | Age: 76
End: 2021-05-10
Payer: MEDICARE

## 2021-05-10 PROCEDURE — 93306 TTE W/DOPPLER COMPLETE: CPT

## 2021-05-11 DIAGNOSIS — Z96.643 PRESENCE OF ARTIFICIAL HIP JOINT, BILATERAL: ICD-10-CM

## 2021-05-11 DIAGNOSIS — I97.89 OTHER POSTPROCEDURAL COMPLICATIONS AND DISORDERS OF THE CIRCULATORY SYSTEM, NOT ELSEWHERE CLASSIFIED: ICD-10-CM

## 2021-05-11 DIAGNOSIS — Z86.018 PERSONAL HISTORY OF OTHER BENIGN NEOPLASM: ICD-10-CM

## 2021-05-11 DIAGNOSIS — Z90.13 ACQUIRED ABSENCE OF BILATERAL BREASTS AND NIPPLES: ICD-10-CM

## 2021-05-11 DIAGNOSIS — R19.7 DIARRHEA, UNSPECIFIED: ICD-10-CM

## 2021-05-11 DIAGNOSIS — E87.5 HYPERKALEMIA: ICD-10-CM

## 2021-05-11 DIAGNOSIS — I10 ESSENTIAL (PRIMARY) HYPERTENSION: ICD-10-CM

## 2021-05-11 DIAGNOSIS — K59.00 CONSTIPATION, UNSPECIFIED: ICD-10-CM

## 2021-05-11 DIAGNOSIS — T47.4X5A ADVERSE EFFECT OF OTHER LAXATIVES, INITIAL ENCOUNTER: ICD-10-CM

## 2021-05-11 DIAGNOSIS — R42 DIZZINESS AND GIDDINESS: ICD-10-CM

## 2021-05-11 DIAGNOSIS — Z79.82 LONG TERM (CURRENT) USE OF ASPIRIN: ICD-10-CM

## 2021-05-11 DIAGNOSIS — E86.0 DEHYDRATION: ICD-10-CM

## 2021-05-11 DIAGNOSIS — I34.0 NONRHEUMATIC MITRAL (VALVE) INSUFFICIENCY: ICD-10-CM

## 2021-05-11 DIAGNOSIS — R94.31 ABNORMAL ELECTROCARDIOGRAM [ECG] [EKG]: ICD-10-CM
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
## 2021-05-11 DIAGNOSIS — Z85.3 PERSONAL HISTORY OF MALIGNANT NEOPLASM OF BREAST: ICD-10-CM

## 2021-05-18 ENCOUNTER — APPOINTMENT (OUTPATIENT)
Dept: CARDIOLOGY | Facility: CLINIC | Age: 76
End: 2021-05-18

## 2021-06-01 ENCOUNTER — RESULT CHARGE (OUTPATIENT)
Age: 76
End: 2021-06-01

## 2021-06-01 ENCOUNTER — APPOINTMENT (OUTPATIENT)
Dept: CARDIOLOGY | Facility: CLINIC | Age: 76
End: 2021-06-01
Payer: MEDICARE

## 2021-06-01 VITALS
DIASTOLIC BLOOD PRESSURE: 78 MMHG | TEMPERATURE: 98 F | WEIGHT: 122 LBS | SYSTOLIC BLOOD PRESSURE: 148 MMHG | HEIGHT: 63 IN | BODY MASS INDEX: 21.62 KG/M2 | HEART RATE: 75 BPM

## 2021-06-01 PROCEDURE — 99214 OFFICE O/P EST MOD 30 MIN: CPT

## 2021-06-01 PROCEDURE — 93000 ELECTROCARDIOGRAM COMPLETE: CPT

## 2021-06-07 ENCOUNTER — OUTPATIENT (OUTPATIENT)
Dept: OUTPATIENT SERVICES | Facility: HOSPITAL | Age: 76
LOS: 1 days | Discharge: HOME | End: 2021-06-07

## 2021-06-07 DIAGNOSIS — Z11.59 ENCOUNTER FOR SCREENING FOR OTHER VIRAL DISEASES: ICD-10-CM

## 2021-06-07 DIAGNOSIS — Z90.13 ACQUIRED ABSENCE OF BILATERAL BREASTS AND NIPPLES: Chronic | ICD-10-CM

## 2021-06-07 DIAGNOSIS — Z96.642 PRESENCE OF LEFT ARTIFICIAL HIP JOINT: Chronic | ICD-10-CM

## 2021-06-07 DIAGNOSIS — Z96.643 PRESENCE OF ARTIFICIAL HIP JOINT, BILATERAL: Chronic | ICD-10-CM

## 2021-06-10 ENCOUNTER — OUTPATIENT (OUTPATIENT)
Dept: OUTPATIENT SERVICES | Facility: HOSPITAL | Age: 76
LOS: 1 days | Discharge: HOME | End: 2021-06-10
Payer: MEDICARE

## 2021-06-10 ENCOUNTER — TRANSCRIPTION ENCOUNTER (OUTPATIENT)
Age: 76
End: 2021-06-10

## 2021-06-10 ENCOUNTER — RESULT REVIEW (OUTPATIENT)
Age: 76
End: 2021-06-10

## 2021-06-10 VITALS
SYSTOLIC BLOOD PRESSURE: 173 MMHG | RESPIRATION RATE: 20 BRPM | TEMPERATURE: 98 F | HEIGHT: 62 IN | DIASTOLIC BLOOD PRESSURE: 75 MMHG | HEART RATE: 82 BPM | WEIGHT: 121.92 LBS

## 2021-06-10 VITALS
DIASTOLIC BLOOD PRESSURE: 71 MMHG | HEART RATE: 66 BPM | OXYGEN SATURATION: 99 % | SYSTOLIC BLOOD PRESSURE: 167 MMHG | RESPIRATION RATE: 18 BRPM

## 2021-06-10 DIAGNOSIS — Z96.643 PRESENCE OF ARTIFICIAL HIP JOINT, BILATERAL: Chronic | ICD-10-CM

## 2021-06-10 DIAGNOSIS — Z96.642 PRESENCE OF LEFT ARTIFICIAL HIP JOINT: Chronic | ICD-10-CM

## 2021-06-10 DIAGNOSIS — Z90.13 ACQUIRED ABSENCE OF BILATERAL BREASTS AND NIPPLES: Chronic | ICD-10-CM

## 2021-06-10 PROCEDURE — 88313 SPECIAL STAINS GROUP 2: CPT | Mod: 26

## 2021-06-10 PROCEDURE — 88305 TISSUE EXAM BY PATHOLOGIST: CPT | Mod: 26

## 2021-06-10 NOTE — CHART NOTE - NSCHARTNOTEFT_GEN_A_CORE
PACU ANESTHESIA ADMISSION NOTE      Procedure:   Post op diagnosis:      ____  Intubated  TV:______       Rate: ______      FiO2: ______    _x___  Patent Airway    __x__  Full return of protective reflexes    _x___  Full recovery from anesthesia / back to baseline     Mental Status:  ___x_ Awake   _____ Alert   _____ Drowsy   _____ Sedated    Nausea/Vomiting:  __x__ NO  ______Yes,   See Post - Op Orders          Pain Scale (0-10):  _0____    Treatment: ____ None    ___x_ See Post - Op/PCA Orders    Post - Operative Fluids:   ____ Oral   ___x_ See Post - Op Orders    Plan: Discharge:   ____Home       _____Floor     _____Critical Care    _____  Other:_________________    Comments: Pt has recovered from anesthesia, no known complications.     Vitals:   T:      98.3     R:            15      BP:           153/88       Sat:    100               P: 77

## 2021-06-10 NOTE — ASU PREOP CHECKLIST - BOWEL PREP
Phil Darling 1626 IP Discharge Follow up Call    Date of discharge: 12/15/18  Facility: [facility]  Non-face-to-face services provided:  Obtained and reviewed discharge summary and/or continuity of care documents  Education of patient/family/caregiver/guardian to support self-management-verify appts, make a sooner neuro appt    Reason for Hospital Visit:  Left facial numbness, arm twitching  Discharged with Home Health?:  No    Date of first visit after discharge:  2/18/18 with PCP  Status:     improved    Did you receive a discharge summary with list of medication from the hospital? Yes  Review of Instructions:     Understands what to report/when to return?:  Yes   Understands discharge instructions?:  Yes   Following discharge instructions?:  No   If not why? Taking medications that were discontinued by physician at the hospital  Is there any lingering symptoms? No  Are you eating and drinking OK? Yes  Any other problems i.e. Constipation, other symptoms? No  Are there any new complaints of pain? No  If you have a wound is the dressing clean, dry, and intact? No  Understands wound care regimen? N/A  Are there any other complaints/concerns that you wish to tell your provider?  no     FU appts/Provider:    Future Appointments  Date Time Provider Sylvester Karina   12/20/2018 10:30 AM Javier [facility], [address] [place] [address],[address] OB/Gyn [facility]   12/20/2018 2:00 PM Luis Salazar MD [facility]   1/7/2019 8:10 AM Yomi Cedeno, DO ORTHO SPECIA [facility]   1/7/2019 1:10 PM Cornelius Lance MD [facility]   1/14/2019 2:20 PM Maury Viera MD [facility] Rehab [facility]   2/18/2019 8:15 AM COLTON Taylor CNP [facility] FP [facility]   2/28/2019 8:30 AM Dafne Lance MD AFL Neph [facility] None   3/6/2019 8:20 AM Baldomero Severs, APRN - CNP Neuro Spec [facility]   10/10/2019 9:00 AM Nancy Gomez MD  derm [facility]       New Medications at discharge?:     No
done

## 2021-06-14 LAB — SURGICAL PATHOLOGY STUDY: SIGNIFICANT CHANGE UP

## 2021-06-16 DIAGNOSIS — I48.91 UNSPECIFIED ATRIAL FIBRILLATION: ICD-10-CM

## 2021-06-16 DIAGNOSIS — I34.0 NONRHEUMATIC MITRAL (VALVE) INSUFFICIENCY: ICD-10-CM

## 2021-06-16 DIAGNOSIS — I10 ESSENTIAL (PRIMARY) HYPERTENSION: ICD-10-CM

## 2021-06-16 DIAGNOSIS — K57.90 DIVERTICULOSIS OF INTESTINE, PART UNSPECIFIED, WITHOUT PERFORATION OR ABSCESS WITHOUT BLEEDING: ICD-10-CM

## 2021-06-16 DIAGNOSIS — Z12.11 ENCOUNTER FOR SCREENING FOR MALIGNANT NEOPLASM OF COLON: ICD-10-CM

## 2021-06-16 DIAGNOSIS — Z85.3 PERSONAL HISTORY OF MALIGNANT NEOPLASM OF BREAST: ICD-10-CM

## 2021-06-16 DIAGNOSIS — Z79.01 LONG TERM (CURRENT) USE OF ANTICOAGULANTS: ICD-10-CM

## 2021-06-16 DIAGNOSIS — K64.8 OTHER HEMORRHOIDS: ICD-10-CM

## 2021-06-21 ENCOUNTER — OUTPATIENT (OUTPATIENT)
Dept: OUTPATIENT SERVICES | Facility: HOSPITAL | Age: 76
LOS: 1 days | Discharge: HOME | End: 2021-06-21

## 2021-06-21 ENCOUNTER — APPOINTMENT (OUTPATIENT)
Dept: HEMATOLOGY ONCOLOGY | Facility: CLINIC | Age: 76
End: 2021-06-21
Payer: MEDICARE

## 2021-06-21 ENCOUNTER — LABORATORY RESULT (OUTPATIENT)
Age: 76
End: 2021-06-21

## 2021-06-21 VITALS
RESPIRATION RATE: 14 BRPM | WEIGHT: 124 LBS | TEMPERATURE: 97.6 F | SYSTOLIC BLOOD PRESSURE: 160 MMHG | BODY MASS INDEX: 21.97 KG/M2 | DIASTOLIC BLOOD PRESSURE: 79 MMHG | HEIGHT: 63 IN | HEART RATE: 69 BPM

## 2021-06-21 DIAGNOSIS — Z90.13 ACQUIRED ABSENCE OF BILATERAL BREASTS AND NIPPLES: Chronic | ICD-10-CM

## 2021-06-21 DIAGNOSIS — Z96.643 PRESENCE OF ARTIFICIAL HIP JOINT, BILATERAL: Chronic | ICD-10-CM

## 2021-06-21 DIAGNOSIS — Z96.642 PRESENCE OF LEFT ARTIFICIAL HIP JOINT: Chronic | ICD-10-CM

## 2021-06-21 LAB
HCT VFR BLD CALC: 35.6 %
HGB BLD-MCNC: 11.5 G/DL
MCHC RBC-ENTMCNC: 30.8 PG
MCHC RBC-ENTMCNC: 32.3 G/DL
MCV RBC AUTO: 95.4 FL
PLATELET # BLD AUTO: 207 K/UL
PMV BLD: 9 FL
RBC # BLD: 3.73 M/UL
RBC # FLD: 12.3 %
WBC # FLD AUTO: 5.9 K/UL

## 2021-06-21 PROCEDURE — 99214 OFFICE O/P EST MOD 30 MIN: CPT

## 2021-06-22 DIAGNOSIS — Z85.3 PERSONAL HISTORY OF MALIGNANT NEOPLASM OF BREAST: ICD-10-CM

## 2021-06-22 LAB
ALBUMIN SERPL ELPH-MCNC: 4.1 G/DL
ALP BLD-CCNC: 53 U/L
ALT SERPL-CCNC: 16 U/L
ANION GAP SERPL CALC-SCNC: 11 MMOL/L
AST SERPL-CCNC: 26 U/L
BILIRUB SERPL-MCNC: 0.4 MG/DL
BUN SERPL-MCNC: 16 MG/DL
CALCIUM SERPL-MCNC: 9.5 MG/DL
CHLORIDE SERPL-SCNC: 104 MMOL/L
CO2 SERPL-SCNC: 23 MMOL/L
CREAT SERPL-MCNC: 0.7 MG/DL
GLUCOSE SERPL-MCNC: 69 MG/DL
POTASSIUM SERPL-SCNC: 4.5 MMOL/L
PROT SERPL-MCNC: 6.7 G/DL
SODIUM SERPL-SCNC: 138 MMOL/L

## 2021-06-22 NOTE — PHYSICAL EXAM
[Fully active, able to carry on all pre-disease performance without restriction] : Status 0 - Fully active, able to carry on all pre-disease performance without restriction [Normal] : affect appropriate [de-identified] : No obvious lymphedema today. [de-identified] : S/P bilateral mastectomies, no evidence of local recurrence. [de-identified] : Arthritic changes noted

## 2021-06-22 NOTE — ASSESSMENT
[FreeTextEntry1] : History of bilateral breast carcinomas, S/P bilateral mastectomy (first tumor in 1997 and the second one in 2001), S/P chemotherapy including high dose and hormonal therapy with tamoxifen, clinically no evidence of recurrence.\par \par We will obtain CBC, CMP and free light chains. If they're all within acceptable limits, she will be seen again in a year and as needed.\par The patient will keep all her appointment with all her other physicians, including her primary care and the subspecialists for her various problems.\par \par Further recommendations after the above results are available.\par \par All questions answered.

## 2021-06-22 NOTE — HISTORY OF PRESENT ILLNESS
[Disease: _____________________] : Disease: [unfilled] [de-identified] : The patient is coming for her regularly scheduled follow up for her history of bilateral breast carcinomas. She also had mildly elevated free light chains but the ratio was normal but she had no monoclonal spike.\par She was last seen about a year ago.\par Recently, she underwent a colonoscopy which turned out to be negative for any malignancy.\par She has her chronic fatigability. 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 Also, she is followed regularly by her cardiologist for valvular issues.\par She is on no new medications.\par \par Note: Her first cancer was diagnosed in 1997 and treated with lumpectomy, chemotherapy, including high dose, and RT and the second one in 2001 when she decided to go with bilateral mastectomy.

## 2021-06-22 NOTE — REVIEW OF SYSTEMS
[Fatigue] : fatigue [Vision Problems] : vision problems [Constipation] : constipation [Negative] : Heme/Lymph [FreeTextEntry2] : Still feeling always cold.

## 2021-06-22 NOTE — CONSULT LETTER
[Dear  ___] : Dear  [unfilled], [Courtesy Letter:] : I had the pleasure of seeing your patient, [unfilled], in my office today. [Please see my note below.] : Please see my note below. [Consult Closing:] : Thank you very much for allowing me to participate in the care of this patient.  If you have any questions, please do not hesitate to contact me. [Sincerely,] : Sincerely, [FreeTextEntry2] : Dr. JENNIFER Sierra [FreeTextEntry3] : Dr. FAIZA Good

## 2021-06-23 LAB
DEPRECATED KAPPA LC FREE/LAMBDA SER: 0.92 RATIO
KAPPA LC CSF-MCNC: 2.84 MG/DL
KAPPA LC SERPL-MCNC: 2.62 MG/DL

## 2021-08-23 ENCOUNTER — LABORATORY RESULT (OUTPATIENT)
Age: 76
End: 2021-08-23

## 2021-08-23 ENCOUNTER — OUTPATIENT (OUTPATIENT)
Dept: OUTPATIENT SERVICES | Facility: HOSPITAL | Age: 76
LOS: 1 days | Discharge: HOME | End: 2021-08-23

## 2021-08-23 DIAGNOSIS — Z96.642 PRESENCE OF LEFT ARTIFICIAL HIP JOINT: Chronic | ICD-10-CM

## 2021-08-23 DIAGNOSIS — Z90.13 ACQUIRED ABSENCE OF BILATERAL BREASTS AND NIPPLES: Chronic | ICD-10-CM

## 2021-08-23 DIAGNOSIS — Z11.59 ENCOUNTER FOR SCREENING FOR OTHER VIRAL DISEASES: ICD-10-CM

## 2021-08-23 DIAGNOSIS — Z96.643 PRESENCE OF ARTIFICIAL HIP JOINT, BILATERAL: Chronic | ICD-10-CM

## 2021-08-26 ENCOUNTER — OUTPATIENT (OUTPATIENT)
Dept: OUTPATIENT SERVICES | Facility: HOSPITAL | Age: 76
LOS: 1 days | Discharge: HOME | End: 2021-08-26
Payer: MEDICARE

## 2021-08-26 DIAGNOSIS — I48.91 UNSPECIFIED ATRIAL FIBRILLATION: ICD-10-CM

## 2021-08-26 DIAGNOSIS — I36.8 OTHER NONRHEUMATIC TRICUSPID VALVE DISORDERS: ICD-10-CM

## 2021-08-26 DIAGNOSIS — Z96.642 PRESENCE OF LEFT ARTIFICIAL HIP JOINT: Chronic | ICD-10-CM

## 2021-08-26 DIAGNOSIS — I10 ESSENTIAL (PRIMARY) HYPERTENSION: ICD-10-CM

## 2021-08-26 DIAGNOSIS — Z96.643 PRESENCE OF ARTIFICIAL HIP JOINT, BILATERAL: Chronic | ICD-10-CM

## 2021-08-26 DIAGNOSIS — Z90.13 ACQUIRED ABSENCE OF BILATERAL BREASTS AND NIPPLES: Chronic | ICD-10-CM

## 2021-08-26 PROCEDURE — 93320 DOPPLER ECHO COMPLETE: CPT | Mod: 26

## 2021-08-26 PROCEDURE — 93312 ECHO TRANSESOPHAGEAL: CPT | Mod: 26,XU

## 2021-08-26 PROCEDURE — 93325 DOPPLER ECHO COLOR FLOW MAPG: CPT | Mod: 26

## 2021-08-26 NOTE — CHART NOTE - NSCHARTNOTEFT_GEN_A_CORE
POST OPERATIVE PROCEDURAL DOCUMENTATION    PRE-OP DIAGNOSIS: Evaluate aortic valve for history of fibroelastoma    PROCEDURE: Transesophageal echocardiogram    Primary Physician: Dr. Lim  Assistant: Grace    ANESTHESIA TYPE  [  ] General Anesthesia  [ x ] Conscious Sedation  [  ] Local/Regional    CONDITION  [  ] Critical  [  ] Serious  [x] Fair  [  ] Good    SPECIMENS REMOVED (IF APPLICABLE): N/A    IMPLANTS (IF APPLICABLE): None    ESTIMATED BLOOD LOSS: None    COMPLICATIONS: None      FINDINGS:    After risks and benefits of procedures were explained, informed consent was obtained and placed in chart.  Refer to Anesthesia note for sedation details.  The TOM probe was passed into the esophagus without difficulty.  Transesophageal and transgastric images were obtained.  The TOM probe was removed without difficulty and examined.  There was no evidence for bleeding.  The patient tolerated the procedure well without any immediate TOM-related complications.      Preliminary Findings:  LA and RA: Mildly enlarged.  MARCELLA: Left atrial appendage was clear of clot and spontaneous echo contrast. Good velocities across MARCELLA.  LV: LVEF was estimated at 55-65%  RV: Normal size and systolic function.  MV: Thickened mitral valve. Mild to Mod MR. No evidence for MS.   AV: Sclerotic aortic valve. No evidence for AI. No evidence for AS. Noted mobile echodensity on leaflet tip of right coronary cusp measuring 1.2 cm.  TV: Mild TR.   IAS: No PFO. NO R-> L shunt on color doppler.  There was mild, non-mobile atheroma seen in the thoracic aorta.     DIAGNOSIS/IMPRESSION:  Mobile echodensity on leaflet tip of right coronary cusp measuring 1.2 cm which represents stable fibroelastoma of aortic valve compared to prior TEEs    PLAN OF CARE:  [x] Discharge home when stable and fully awake.    Results of procedure/ plan of care discussed with patient/  in detail. POST OPERATIVE PROCEDURAL DOCUMENTATION    PRE-OP DIAGNOSIS: Evaluate aortic valve for history of fibroelastoma    PROCEDURE: Transesophageal echocardiogram    Primary Physician: Dr. Lim  Assistant: Grace    ANESTHESIA TYPE  [  ] General Anesthesia  [ x ] Conscious Sedation  [  ] Local/Regional    CONDITION  [  ] Critical  [  ] Serious  [x] Fair  [  ] Good    SPECIMENS REMOVED (IF APPLICABLE): N/A    IMPLANTS (IF APPLICABLE): None    ESTIMATED BLOOD LOSS: None    COMPLICATIONS: None      FINDINGS:    After risks and benefits of procedures were explained, informed consent was obtained and placed in chart.  Refer to Anesthesia note for sedation details.  The TOM probe was passed into the esophagus without difficulty.  Transesophageal and transgastric images were obtained.  The TOM probe was removed without difficulty and examined.  There was no evidence for bleeding.  The patient tolerated the procedure well without any immediate TOM-related complications.      Preliminary Findings:  LA and RA: Mildly enlarged.  MARCELLA: Left atrial appendage was clear of clot and spontaneous echo contrast.  LV: LVEF was estimated at 55-65%  RV: Normal size and systolic function.  MV: Thickened mitral valve. Mild to Mod MR. No evidence for MS.   AV: Sclerotic aortic valve. No evidence for AI. No evidence for AS. Noted mobile echodensity on leaflet tip of right coronary cusp measuring 1.2 cm.  TV: Mild TR.   IAS: No PFO. NO R-> L shunt on color doppler.  There was mild, non-mobile atheroma seen in the thoracic aorta.     DIAGNOSIS/IMPRESSION:  Mobile echodensity on leaflet tip of right coronary cusp measuring 1.2 cm which represents stable fibroelastoma of aortic valve compared to prior TEEs    PLAN OF CARE:  [x] Discharge home when stable and fully awake.    Results of procedure/ plan of care discussed with patient/  in detail.

## 2021-08-26 NOTE — H&P CARDIOLOGY - NS NEC GEN PE MLT EXAM PC
Left message at # to call back.  Eli MOORE    
Patient called back. Please call him back at your convenience. He can be reached at [phone].  Thank you,  Suyapa Diamond   for [facility]    
Reason for Call:  Same Day Appointment, Requested Provider:  Nate Cifuentes D.O.    PCP: Nate Cifuentes    Reason for visit: BLood pressure med fu, patient is scheduled for next week would prefer to be seen this week to discuss symptoms in change with blood pressure meds    Duration of symptoms:     Have you been treated for this in the past? Yes    Additional comments: Patient is aware Dr HITCHCOCK will be back tomorrow    Can we leave a detailed message on this number? YES    Phone number patient can be reached at: Cell number on file:    Telephone Information:   Mobile [phone]       Best Time: any    Call taken on 3/27/2017 at 7:58 AM by Shari Beyer    
appt made. Eli VELAZQUEZA    
No bruits; no thyromegaly or nodules

## 2021-08-26 NOTE — H&P CARDIOLOGY - HISTORY OF PRESENT ILLNESS
75 y/o F with PMH of HTN, breast cancer s/p bilateral mastectomy, valvular heart disease and fibroelastoma of aortic valve presented today for TOM for evaluation of fibroelastoma

## 2021-08-26 NOTE — H&P CARDIOLOGY - RESPIRATORY AND THORAX
negative Detail Level: Detailed Depth Of Biopsy: dermis Was A Bandage Applied: Yes Size Of Lesion In Cm: 0 Biopsy Type: H and E Biopsy Method: Dermablade Anesthesia Type: 1% lidocaine without epinephrine Anesthesia Volume In Cc (Will Not Render If 0): 0.5 Hemostasis: Drysol Wound Care: Petrolatum Dressing: bandage Destruction After The Procedure: No Type Of Destruction Used: Curettage Curettage Text: The wound bed was treated with curettage after the biopsy was performed. Cryotherapy Text: The wound bed was treated with cryotherapy after the biopsy was performed. Electrodesiccation Text: The wound bed was treated with electrodesiccation after the biopsy was performed. Electrodesiccation And Curettage Text: The wound bed was treated with electrodesiccation and curettage after the biopsy was performed. Silver Nitrate Text: The wound bed was treated with silver nitrate after the biopsy was performed. Lab: -8 Lab Facility: 3 Consent: Written consent was obtained and risks were reviewed including but not limited to scarring, infection, bleeding, scabbing, incomplete removal, nerve damage and allergy to anesthesia. Post-Care Instructions: I reviewed with the patient in detail post-care instructions. Patient is to keep the biopsy site dry overnight, and then apply bacitracin twice daily until healed. Patient may apply hydrogen peroxide soaks to remove any crusting. Notification Instructions: Patient will be notified of biopsy results. However, patient instructed to call the office if not contacted within 2 weeks. Billing Type: Third-Party Bill Information: Selecting Yes will display possible errors in your note based on the variables you have selected. This validation is only offered as a suggestion for you. PLEASE NOTE THAT THE VALIDATION TEXT WILL BE REMOVED WHEN YOU FINALIZE YOUR NOTE. IF YOU WANT TO FAX A PRELIMINARY NOTE YOU WILL NEED TO TOGGLE THIS TO 'NO' IF YOU DO NOT WANT IT IN YOUR FAXED NOTE.

## 2021-12-03 ENCOUNTER — APPOINTMENT (OUTPATIENT)
Dept: CARDIOLOGY | Facility: CLINIC | Age: 76
End: 2021-12-03

## 2021-12-16 ENCOUNTER — APPOINTMENT (OUTPATIENT)
Dept: CARDIOLOGY | Facility: CLINIC | Age: 76
End: 2021-12-16
Payer: MEDICARE

## 2021-12-16 ENCOUNTER — RESULT CHARGE (OUTPATIENT)
Age: 76
End: 2021-12-16

## 2021-12-16 VITALS
DIASTOLIC BLOOD PRESSURE: 68 MMHG | HEART RATE: 69 BPM | WEIGHT: 124 LBS | SYSTOLIC BLOOD PRESSURE: 142 MMHG | TEMPERATURE: 97.1 F | BODY MASS INDEX: 21.97 KG/M2 | HEIGHT: 63 IN

## 2021-12-16 VITALS
TEMPERATURE: 97.1 F | DIASTOLIC BLOOD PRESSURE: 68 MMHG | SYSTOLIC BLOOD PRESSURE: 142 MMHG | BODY MASS INDEX: 21.97 KG/M2 | HEART RATE: 69 BPM | WEIGHT: 124 LBS | HEIGHT: 63 IN

## 2021-12-16 PROCEDURE — 99213 OFFICE O/P EST LOW 20 MIN: CPT

## 2021-12-16 PROCEDURE — 93000 ELECTROCARDIOGRAM COMPLETE: CPT

## 2021-12-27 ENCOUNTER — OUTPATIENT (OUTPATIENT)
Dept: OUTPATIENT SERVICES | Facility: HOSPITAL | Age: 76
LOS: 1 days | Discharge: HOME | End: 2021-12-27

## 2021-12-27 ENCOUNTER — LABORATORY RESULT (OUTPATIENT)
Age: 76
End: 2021-12-27

## 2021-12-27 ENCOUNTER — APPOINTMENT (OUTPATIENT)
Dept: HEMATOLOGY ONCOLOGY | Facility: CLINIC | Age: 76
End: 2021-12-27
Payer: MEDICARE

## 2021-12-27 DIAGNOSIS — Z96.643 PRESENCE OF ARTIFICIAL HIP JOINT, BILATERAL: Chronic | ICD-10-CM

## 2021-12-27 DIAGNOSIS — Z90.13 ACQUIRED ABSENCE OF BILATERAL BREASTS AND NIPPLES: Chronic | ICD-10-CM

## 2021-12-27 DIAGNOSIS — Z96.642 PRESENCE OF LEFT ARTIFICIAL HIP JOINT: Chronic | ICD-10-CM

## 2021-12-27 LAB
HCT VFR BLD CALC: 37.7 %
HGB BLD-MCNC: 12.1 G/DL
MCHC RBC-ENTMCNC: 31.1 PG
MCHC RBC-ENTMCNC: 32.1 G/DL
MCV RBC AUTO: 96.9 FL
PLATELET # BLD AUTO: 222 K/UL
PMV BLD: 9.6 FL
RBC # BLD: 3.89 M/UL
RBC # FLD: 11.9 %
WBC # FLD AUTO: 5.05 K/UL

## 2021-12-27 PROCEDURE — ZZZZZ: CPT

## 2021-12-28 LAB
ALBUMIN SERPL ELPH-MCNC: 4.3 G/DL
ALP BLD-CCNC: 56 U/L
ALT SERPL-CCNC: 14 U/L
ANION GAP SERPL CALC-SCNC: 15 MMOL/L
AST SERPL-CCNC: 25 U/L
BILIRUB SERPL-MCNC: 0.3 MG/DL
BUN SERPL-MCNC: 17 MG/DL
CALCIUM SERPL-MCNC: 10 MG/DL
CHLORIDE SERPL-SCNC: 102 MMOL/L
CO2 SERPL-SCNC: 23 MMOL/L
CREAT SERPL-MCNC: 0.7 MG/DL
DEPRECATED KAPPA LC FREE/LAMBDA SER: 1.19 RATIO
GLUCOSE SERPL-MCNC: 87 MG/DL
KAPPA LC CSF-MCNC: 2.09 MG/DL
KAPPA LC SERPL-MCNC: 2.48 MG/DL
POTASSIUM SERPL-SCNC: 4.9 MMOL/L
PROT SERPL-MCNC: 7 G/DL
SODIUM SERPL-SCNC: 140 MMOL/L

## 2021-12-30 LAB
ALBUMIN MFR SERPL ELPH: 56.2 %
ALBUMIN SERPL-MCNC: 3.9 G/DL
ALBUMIN/GLOB SERPL: 1.3 RATIO
ALPHA1 GLOB MFR SERPL ELPH: 3.5 %
ALPHA1 GLOB SERPL ELPH-MCNC: 0.2 G/DL
ALPHA2 GLOB MFR SERPL ELPH: 11.4 %
ALPHA2 GLOB SERPL ELPH-MCNC: 0.8 G/DL
B-GLOBULIN MFR SERPL ELPH: 10.5 %
B-GLOBULIN SERPL ELPH-MCNC: 0.7 G/DL
GAMMA GLOB FLD ELPH-MCNC: 1.3 G/DL
GAMMA GLOB MFR SERPL ELPH: 18.4 %
INTERPRETATION SERPL IEP-IMP: NORMAL
PROT SERPL-MCNC: 7 G/DL

## 2022-01-20 NOTE — ASU PATIENT PROFILE, ADULT - PT NEEDS ASSIST
"[facility]  ED Nurse Handoff Report    Connor Emerson is a 43 year old male   ED Chief complaint: Shortness of Breath  . ED Diagnosis:   Final diagnoses:   Pleural effusion on right     Allergies:   Allergies   Allergen Reactions     Vicodin [Hydrocodone-Acetaminophen] Nausea and Vomiting and GI Disturbance       Code Status: Full Code  Activity level - Baseline/Home:  Independent. Activity Level - Current:   Stand by Assist. Lift room needed: No. Bariatric: No   Needed: No   Isolation: no. Infection: Not Applicable.     Vital Signs:   Vitals:    01/19/22 1952 01/19/22 2145 01/19/22 2230   BP: (!) 189/133     Pulse: (!) 121     Resp: 26  20   Temp: 100.2  F (37.9  C)     TempSrc: Oral     SpO2: 95% 97% 95%   Height: 1.753 m (5' 9\")         Cardiac Rhythm:  ,      Pain level:    Patient confused: No. Patient Falls Risk: No.   Elimination Status: Has voided   Patient Report - Initial Complaint: sob Focused Assessment: Diminished RUL, RLL, pain to right side chest/ abdomen  Tests Performed:   Labs Ordered and Resulted from Time of ED Arrival to Time of ED Departure   BASIC METABOLIC PANEL - Abnormal       Result Value    Sodium 137      Potassium 4.0      Chloride 104      Carbon Dioxide (CO2) 30      Anion Gap 3      Urea Nitrogen 14      Creatinine 0.56 (*)     Calcium 9.1      Glucose 399 (*)     GFR Estimate >90     BLOOD GAS VENOUS WITH OXYHEMOGLOBIN - Abnormal    pH Venous 7.44 (*)     pCO2 Venous 43      pO2 Venous 49 (*)     Bicarbonate Venous 29 (*)     FIO2 35      Oxyhemoglobin Venous 84 (*)     Base Excess/Deficit (+/-) 4.3 (*)    HEPATIC FUNCTION PANEL - Abnormal    Bilirubin Total 0.5      Bilirubin Direct 0.2      Protein Total 7.2      Albumin 3.0 (*)     Alkaline Phosphatase 87      AST 12      ALT 24     TROPONIN I - Normal    Troponin I High Sensitivity 5     CBC WITH PLATELETS AND DIFFERENTIAL    WBC Count 10.1      RBC Count 5.36      Hemoglobin 15.9      Hematocrit 49.4   "    MCV 92      MCH 29.7      MCHC 32.2      RDW 12.1      Platelet Count 381      % Neutrophils 73      % Lymphocytes 14      % Monocytes 8      % Eosinophils 4      % Basophils 1      % Immature Granulocytes 0      NRBCs per 100 WBC 0      Absolute Neutrophils 7.5      Absolute Lymphocytes 1.4      Absolute Monocytes 0.8      Absolute Eosinophils 0.4      Absolute Basophils 0.1      Absolute Immature Granulocytes 0.0      Absolute NRBCs 0.0     INFLUENZA A/B & SARS-COV2 PCR MULTIPLEX   LACTATE DEHYDROGENASE FLUID   PROTEIN FLUID   GLUCOSE FLUID   CELL COUNT BODY FLUID   GLUCOSE MONITOR NURSING POCT   HEMOGLOBIN A1C   NON-GYNECOLOGIC CYTOLOGY   NON-GYNECOLOGIC CYTOLOGY   RESPIRATORY AEROBIC BACTERIAL CULTURE   GRAM STAIN   CELL COUNT WITH DIFFERENTIAL FLUID   CELL COUNT WITH DIFFERENTIAL FLUID     XR Chest Port 1 View   Final Result   IMPRESSION: Complete opacification of the right hemithorax. This is compatible with a very large right-sided pleural fluid collection as shown on the CT. Left lung clear. Normal heart size and pulmonary vascularity. Postoperative changes right humerus.    Degenerative changes in the spine.      CT Chest Pulmonary Embolism w Contrast   Final Result   IMPRESSION:   1.  No pulmonary embolism.      2.  Massive right pleural effusion with right to left mediastinal shift. Underlying compressive consolidation right lung. Mucus or debris right lower lobe bronchus.      3.  Several indeterminate nodules in the left lung suspicious for possible metastases.      4.  Subcarinal lymphadenopathy.      NOTE: ABNORMAL REPORT      THE DICTATION ABOVE DESCRIBES AN ABNORMALITY FOR WHICH FOLLOW-UP IS NEEDED.       US Thoracentesis    (Results Pending)    Abnormal Results: see above   Treatments provided: thoracentesis, see mar  Family Comments:   OBS brochure/video discussed/provided to patient:  N/A  ED Medications:   Medications   lidocaine 1% with EPINEPHrine 1:100,000 injection 1 mL (has no  administration in time range)   lidocaine 1 % 0.1-1 mL (has no administration in time range)   lidocaine (LMX4) cream (has no administration in time range)   sodium chloride (PF) 0.9% PF flush 3 mL (has no administration in time range)   sodium chloride (PF) 0.9% PF flush 3 mL (has no administration in time range)   melatonin tablet 1 mg (has no administration in time range)   lidocaine 1 % 0.1-1 mL (has no administration in time range)   lidocaine (LMX4) cream (has no administration in time range)   sodium chloride (PF) 0.9% PF flush 3 mL (has no administration in time range)   sodium chloride (PF) 0.9% PF flush 3 mL (has no administration in time range)   nitroGLYcerin (NITROSTAT) sublingual tablet 0.4 mg (has no administration in time range)   glucose gel 15-30 g (has no administration in time range)     Or   dextrose 50 % injection 25-50 mL (has no administration in time range)     Or   glucagon injection 1 mg (has no administration in time range)   0.9% sodium chloride BOLUS (has no administration in time range)   acetaminophen (TYLENOL) tablet 650 mg (has no administration in time range)     Or   acetaminophen (TYLENOL) Suppository 650 mg (has no administration in time range)   ibuprofen (ADVIL/MOTRIN) tablet 600 mg (has no administration in time range)   ondansetron (ZOFRAN-ODT) ODT tab 4 mg (has no administration in time range)     Or   ondansetron (ZOFRAN) injection 4 mg (has no administration in time range)   prochlorperazine (COMPAZINE) injection 10 mg (has no administration in time range)     Or   prochlorperazine (COMPAZINE) tablet 10 mg (has no administration in time range)     Or   prochlorperazine (COMPAZINE) suppository 25 mg (has no administration in time range)   cefTRIAXone (ROCEPHIN) 2 g vial to attach to  ml bag for ADULTS or NS 50 ml bag for PEDS (has no administration in time range)   azithromycin 500 mg (ZITHROMAX) in 0.9% NaCl 250 mL intermittent infusion 500 mg (has no administration  in time range)   azithromycin (ZITHROMAX) 250 mg in sodium chloride 0.9 % 250 mL intermittent infusion (has no administration in time range)   guaiFENesin (ROBITUSSIN) 20 mg/mL solution 10 mL (has no administration in time range)   insulin aspart (NovoLOG) injection (RAPID ACTING) (has no administration in time range)   0.9% sodium chloride BOLUS (0 mLs Intravenous Stopped 1/19/22 2149)   ketorolac (TORADOL) injection 30 mg (30 mg Intravenous Given 1/19/22 2003)   ipratropium - albuterol 0.5 mg/2.5 mg/3 mL (DUONEB) neb solution 3 mL (3 mLs Nebulization Given 1/19/22 2002)   CT Scan Flush (90 mLs Intravenous Given 1/19/22 2120)   iopamidol (ISOVUE-370) solution 500 mL (73 mLs Intravenous Given 1/19/22 2120)     Drips infusing:  No  For the majority of the shift, the patient's behavior Green. Interventions performed were n/a    Sepsis treatment initiated: No     Patient tested for COVID 19 prior to admission: YES    ED Nurse Name/Phone Number: Derik Dai RN,   10:51 PM    RECEIVING UNIT ED HANDOFF REVIEW    Above ED Nurse Handoff Report was reviewed: Yes  Reviewed by: Britt Noland RN on January 20, 2022 at 12:09 AM        no

## 2022-02-28 DIAGNOSIS — C50.919 MALIGNANT NEOPLASM OF UNSPECIFIED SITE OF UNSPECIFIED FEMALE BREAST: ICD-10-CM

## 2022-05-17 ENCOUNTER — LABORATORY RESULT (OUTPATIENT)
Age: 77
End: 2022-05-17

## 2022-05-19 ENCOUNTER — RESULT REVIEW (OUTPATIENT)
Age: 77
End: 2022-05-19

## 2022-05-20 ENCOUNTER — TRANSCRIPTION ENCOUNTER (OUTPATIENT)
Age: 77
End: 2022-05-20

## 2022-05-20 ENCOUNTER — RESULT REVIEW (OUTPATIENT)
Age: 77
End: 2022-05-20

## 2022-05-20 ENCOUNTER — OUTPATIENT (OUTPATIENT)
Dept: OUTPATIENT SERVICES | Facility: HOSPITAL | Age: 77
LOS: 1 days | Discharge: HOME | End: 2022-05-20
Payer: MEDICARE

## 2022-05-20 VITALS
DIASTOLIC BLOOD PRESSURE: 72 MMHG | OXYGEN SATURATION: 98 % | SYSTOLIC BLOOD PRESSURE: 176 MMHG | RESPIRATION RATE: 18 BRPM | HEART RATE: 70 BPM

## 2022-05-20 VITALS
WEIGHT: 121.92 LBS | DIASTOLIC BLOOD PRESSURE: 79 MMHG | RESPIRATION RATE: 18 BRPM | SYSTOLIC BLOOD PRESSURE: 176 MMHG | TEMPERATURE: 97 F | HEIGHT: 62 IN | HEART RATE: 62 BPM

## 2022-05-20 DIAGNOSIS — Z96.642 PRESENCE OF LEFT ARTIFICIAL HIP JOINT: Chronic | ICD-10-CM

## 2022-05-20 DIAGNOSIS — Z96.643 PRESENCE OF ARTIFICIAL HIP JOINT, BILATERAL: Chronic | ICD-10-CM

## 2022-05-20 DIAGNOSIS — Z90.13 ACQUIRED ABSENCE OF BILATERAL BREASTS AND NIPPLES: Chronic | ICD-10-CM

## 2022-05-20 PROCEDURE — 88312 SPECIAL STAINS GROUP 1: CPT | Mod: 26

## 2022-05-20 PROCEDURE — 88173 CYTOPATH EVAL FNA REPORT: CPT | Mod: 26

## 2022-05-20 PROCEDURE — 43239 EGD BIOPSY SINGLE/MULTIPLE: CPT | Mod: XU

## 2022-05-20 PROCEDURE — 88305 TISSUE EXAM BY PATHOLOGIST: CPT | Mod: 26

## 2022-05-20 PROCEDURE — 88305 TISSUE EXAM BY PATHOLOGIST: CPT | Mod: 26,59

## 2022-05-20 PROCEDURE — 43242 EGD US FINE NEEDLE BX/ASPIR: CPT

## 2022-05-20 RX ORDER — CIPROFLOXACIN LACTATE 400MG/40ML
1 VIAL (ML) INTRAVENOUS
Qty: 10 | Refills: 0
Start: 2022-05-20

## 2022-05-20 RX ORDER — CIPROFLOXACIN LACTATE 400MG/40ML
400 VIAL (ML) INTRAVENOUS ONCE
Refills: 0 | Status: DISCONTINUED | OUTPATIENT
Start: 2022-05-20 | End: 2022-06-03

## 2022-05-20 NOTE — CHART NOTE - NSCHARTNOTEFT_GEN_A_CORE
PACU ANESTHESIA ADMISSION NOTE      Procedure: EUS  Post op diagnosis:  Abdominal pain    ____  Intubated  TV:______       Rate: ______      FiO2: ______    _X___  Patent Airway    _X___  Full return of protective reflexes    _X___  Full recovery from anesthesia / back to baseline     Vitals:   T:  97.9 F         R:  16                BP:  155/71                Sat:  98%                 P: 79 bpm      Mental Status:  __X__ Awake   __X___ Alert   _____ Drowsy   _____ Sedated    Nausea/Vomiting:  __X__ NO  ______Yes,   See Post - Op Orders          Pain Scale (0-10):  __X___    Treatment: __X__ None    ____ See Post - Op/PCA Orders    Post - Operative Fluids:   ____ Oral   __X__ See Post - Op Orders    Plan: Discharge:   __X__Home       _____Floor     _____Critical Care    _____  Other:_________________    Comments: Patient dropped off to PACU. VSS. Airway patent. Pt awake and responsive. Report to PACU RN at bedside. No anesthesia complications.

## 2022-05-20 NOTE — ASU PREOP CHECKLIST - BMI (KG/M2)
Problem(s) Oriented visit        SUBJECTIVE:                                                    Salina Khan is a 55 year old female who presents to clinic today for the following health issues :        1. Benign essential hypertension  Blood presure remains well controlled when checked out of clinic.    Reviewed last 6 BP readings in chart:  BP Readings from Last 6 Encounters:   03/17/20 (!) 134/92   01/15/19 118/84   12/27/18 152/88   07/18/18 152/84   06/26/18 145/87   09/28/17 160/84       she has not experienced any significant side effects from medications for hypertension.    NO active cardiac complaints or symptoms with exercise.    - lisinopril (ZESTRIL) 20 MG tablet; Take 1 tablet (20 mg) by mouth daily  Dispense: 90 tablet; Refill: 1    2. Mild intermittent reactive airway disease with acute exacerbation  The patient complains of cough non-productive, harsh, waxing and waning over time for 3 weeks.  Quality: persistent  Severity: moderate to severe  Associated symptoms: malaise.  Was wheezing now that is better.    3.   Depression:  Has known history of depression.  Has been on medication for this.  The patient does not report any significant side effects of this medication.  The prior symptoms leading to the original diagnosis and decision to start medication therapy are better.     Appetite is stable.  Sleeping patterns are stable.  No reported thoughts of suicide or homicide.  Going through divorce and times are more stressful  Last 3 PHQ9 results:  PHQ-9 SCORE 6/30/2017 9/28/2017 12/27/2018 1/15/2019   PHQ-9 Total Score 7 0 4 0       Problem list, Medication list, Allergies, and Medical/Social/Surgical histories reviewed in [facility] and updated as appropriate.   Additional history: as documented    ROS:  General and Resp. 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     Family History   Problem Relation Age of Onset     Hypertension Mother      Congenital Anomalies Father         cystic fibrosis  age 70's     Diabetes Father            OBJECTIVE:                                                    BP (!) 134/92   Pulse 109   Temp 98.8  F (37.1  C) (Oral)   Resp 18   Ht 1.575 m (5' 2\")   Wt 99.3 kg (219 lb)   LMP 10/09/2015 (Approximate)   SpO2 98%   BMI 40.06 kg/m    Body mass index is 40.06 kg/m .   APPEARANCE: = Relaxed and in no distress  Conj/Eyelids = noninjected and lids and lashes are without inflammation  PERRLA/Irises = Pupils Round Reactive to Light and Irisis without inflammation  Ears/Nose = External structures and Nares have usual shape and form  Ear canals and TM's = Canals are not inflammed and have none or little wax and the drums are not injected and have a light reflex   Lips/Teeth/Gums = No lesions seen, good dentition, and gums seem healthy  Oropharynx = No leukoplakia, No injection to the tissues, Normal Uvula  Neck = No anterior or posterior adenopathy appreciated.  Resp effort = Calm regular breathing  Breath Sounds =  scattered rhonchi  Mood/Affect = Cooperative and interested     ASSESSMENT/PLAN:                                                        Salina was seen today for cough.    Diagnoses and all orders " for this visit:    Mild intermittent reactive airway disease with acute exacerbation  -     sulfamethoxazole-trimethoprim (BACTRIM DS) 800-160 MG tablet; Take 1 tablet by mouth 2 times daily    Benign essential hypertension  -     lisinopril (ZESTRIL) 20 MG tablet; Take 1 tablet (20 mg) by mouth daily    Recurrent major depressive disorder, in full remission (H)    Discussed current hypertension treatment guidelines, including indications for treatment and treatment options.  Discussed the importance for aggressive management of HTN to prevent vascular complications later.  Recommended lower fat, lower carbohydrate, and lower sodium (<2000 mg)diet.  Discussed required intervals for follow up on HTN, lab studies.  Recommened pt. follow their blood pressures outside the clinic to ensure that BPs are remaining within guidelines, and to contact me if the readings are not within guidelines on a regular basis so we can adjust treatment as needed.      Work on weight loss  Regular exercise  Patient Instructions   Please stay at home until no fever for 48 hours and no fever reducing medicine and no cough for same time frame.      The following health maintenance items are reviewed in Epic and correct as of today:  Health Maintenance   Topic Date Due     HEPATITIS C SCREENING  1964     ADVANCE CARE PLANNING  1964     MAMMO SCREENING  1964     COLORECTAL CANCER SCREENING  08/06/1974     HIV SCREENING  08/06/1979     PNEUMOCOCCAL IMMUNIZATION 19-64 MEDIUM RISK (1 of 1 - PPSV23) 08/06/1983     ZOSTER IMMUNIZATION (1 of 2) 08/06/2014     PAP  10/27/2018     JASON ASSESSMENT  07/15/2019     PHQ-9  07/15/2019     INFLUENZA VACCINE (1) 09/01/2019     PREVENTIVE CARE VISIT  01/15/2020     DTAP/TDAP/TD IMMUNIZATION (3 - Td) 01/26/2020     LIPID  12/27/2023     DEPRESSION ACTION PLAN  Completed     IPV IMMUNIZATION  Aged Out     MENINGITIS IMMUNIZATION  Aged Out       Devin Beebe MD  [facility]  GROUP  Family Practice  [facility]  [phone]    For any issues my office # is [phone]         22.3

## 2022-05-21 LAB
AMYLASE FLD-CCNC: 81 U/L — SIGNIFICANT CHANGE UP
SPECIMEN SOURCE FLD: SIGNIFICANT CHANGE UP

## 2022-05-23 LAB — SURGICAL PATHOLOGY STUDY: SIGNIFICANT CHANGE UP

## 2022-05-25 LAB
CEA FLD-MCNC: 6318 NG/ML — SIGNIFICANT CHANGE UP
SPECIMEN SOURCE FLD: SIGNIFICANT CHANGE UP

## 2022-05-26 DIAGNOSIS — Z85.3 PERSONAL HISTORY OF MALIGNANT NEOPLASM OF BREAST: ICD-10-CM

## 2022-05-26 DIAGNOSIS — K29.50 UNSPECIFIED CHRONIC GASTRITIS WITHOUT BLEEDING: ICD-10-CM

## 2022-05-26 DIAGNOSIS — K86.2 CYST OF PANCREAS: ICD-10-CM

## 2022-05-26 DIAGNOSIS — I34.0 NONRHEUMATIC MITRAL (VALVE) INSUFFICIENCY: ICD-10-CM

## 2022-05-26 DIAGNOSIS — K31.7 POLYP OF STOMACH AND DUODENUM: ICD-10-CM

## 2022-05-26 DIAGNOSIS — Z79.82 LONG TERM (CURRENT) USE OF ASPIRIN: ICD-10-CM

## 2022-05-26 DIAGNOSIS — I10 ESSENTIAL (PRIMARY) HYPERTENSION: ICD-10-CM

## 2022-05-26 DIAGNOSIS — Z96.643 PRESENCE OF ARTIFICIAL HIP JOINT, BILATERAL: ICD-10-CM

## 2022-05-26 DIAGNOSIS — I48.0 PAROXYSMAL ATRIAL FIBRILLATION: ICD-10-CM

## 2022-05-26 LAB — NON-GYNECOLOGICAL CYTOLOGY STUDY: SIGNIFICANT CHANGE UP

## 2022-06-09 ENCOUNTER — APPOINTMENT (OUTPATIENT)
Dept: CARDIOLOGY | Facility: CLINIC | Age: 77
End: 2022-06-09
Payer: MEDICARE

## 2022-06-09 PROCEDURE — 93306 TTE W/DOPPLER COMPLETE: CPT

## 2022-06-15 ENCOUNTER — APPOINTMENT (OUTPATIENT)
Dept: CARDIOLOGY | Facility: CLINIC | Age: 77
End: 2022-06-15
Payer: MEDICARE

## 2022-06-15 VITALS
HEIGHT: 63 IN | SYSTOLIC BLOOD PRESSURE: 160 MMHG | WEIGHT: 124 LBS | TEMPERATURE: 95.1 F | HEART RATE: 65 BPM | RESPIRATION RATE: 14 BRPM | BODY MASS INDEX: 21.97 KG/M2 | DIASTOLIC BLOOD PRESSURE: 78 MMHG | OXYGEN SATURATION: 98 %

## 2022-06-15 PROCEDURE — 93000 ELECTROCARDIOGRAM COMPLETE: CPT

## 2022-06-15 PROCEDURE — 99213 OFFICE O/P EST LOW 20 MIN: CPT

## 2022-08-17 ENCOUNTER — APPOINTMENT (OUTPATIENT)
Dept: NEUROLOGY | Facility: CLINIC | Age: 77
End: 2022-08-17

## 2022-08-17 VITALS
HEART RATE: 84 BPM | BODY MASS INDEX: 21.97 KG/M2 | DIASTOLIC BLOOD PRESSURE: 81 MMHG | WEIGHT: 124 LBS | SYSTOLIC BLOOD PRESSURE: 171 MMHG | HEIGHT: 63 IN

## 2022-08-17 DIAGNOSIS — M54.50 LOW BACK PAIN, UNSPECIFIED: ICD-10-CM

## 2022-08-17 DIAGNOSIS — G89.29 LOW BACK PAIN, UNSPECIFIED: ICD-10-CM

## 2022-08-17 PROCEDURE — 99204 OFFICE O/P NEW MOD 45 MIN: CPT

## 2022-08-17 NOTE — ASSESSMENT
[FreeTextEntry1] :  acute cervical radiculopathy- i  would like to send her for MRI of the cervical  spine without gadolinium as well as EMG nerve conduction study of the upper extremities for further evaluation.  I recommend that round of physical therapy for symptomatic treatment.  I offer her medication but she declined.  If she is not any better with physical therapy we will send her for pain management for further evaluation.   \par \par  She also complains of back  pain so I will include back in the physical therapy script

## 2022-08-17 NOTE — PHYSICAL EXAM

## 2022-08-17 NOTE — HISTORY OF PRESENT ILLNESS
[FreeTextEntry1] : Patient presents today for neurological consultation in regard to neck pain. She has had this pain for a few months following no precipitating injury or event. Patient notes that she feels shooting pain in the left arm. She denies any numbness and tingling in the upper extremity. She also notes some shooting pain in the left side of the jaw. Patient notes pain usually rates 5/10 but can occasionally reach up to 10/10. She notes pain is made worse with movement and activity. She denied any prior treatment or imaging. Patient denies taking any medication for symptom control.

## 2022-08-19 ENCOUNTER — APPOINTMENT (OUTPATIENT)
Dept: PAIN MANAGEMENT | Facility: CLINIC | Age: 77
End: 2022-08-19

## 2022-08-19 PROCEDURE — 95912 NRV CNDJ TEST 11-12 STUDIES: CPT

## 2022-08-19 PROCEDURE — 95886 MUSC TEST DONE W/N TEST COMP: CPT

## 2022-08-28 ENCOUNTER — LABORATORY RESULT (OUTPATIENT)
Age: 77
End: 2022-08-28

## 2022-08-31 ENCOUNTER — OUTPATIENT (OUTPATIENT)
Dept: OUTPATIENT SERVICES | Facility: HOSPITAL | Age: 77
LOS: 1 days | Discharge: HOME | End: 2022-08-31

## 2022-08-31 VITALS
OXYGEN SATURATION: 94 % | WEIGHT: 125 LBS | HEART RATE: 71 BPM | HEIGHT: 63 IN | SYSTOLIC BLOOD PRESSURE: 191 MMHG | RESPIRATION RATE: 16 BRPM | DIASTOLIC BLOOD PRESSURE: 67 MMHG

## 2022-08-31 DIAGNOSIS — R94.31 ABNORMAL ELECTROCARDIOGRAM [ECG] [EKG]: ICD-10-CM

## 2022-08-31 DIAGNOSIS — Z96.642 PRESENCE OF LEFT ARTIFICIAL HIP JOINT: Chronic | ICD-10-CM

## 2022-08-31 DIAGNOSIS — Z96.643 PRESENCE OF ARTIFICIAL HIP JOINT, BILATERAL: Chronic | ICD-10-CM

## 2022-08-31 DIAGNOSIS — D15.1 BENIGN NEOPLASM OF HEART: ICD-10-CM

## 2022-08-31 DIAGNOSIS — Z90.13 ACQUIRED ABSENCE OF BILATERAL BREASTS AND NIPPLES: Chronic | ICD-10-CM

## 2022-08-31 PROCEDURE — 93325 DOPPLER ECHO COLOR FLOW MAPG: CPT | Mod: 26

## 2022-08-31 PROCEDURE — 93312 ECHO TRANSESOPHAGEAL: CPT | Mod: 26,XU

## 2022-08-31 PROCEDURE — 93320 DOPPLER ECHO COMPLETE: CPT | Mod: 26

## 2022-08-31 RX ORDER — ASPIRIN/CALCIUM CARB/MAGNESIUM 324 MG
1 TABLET ORAL
Qty: 0 | Refills: 0 | DISCHARGE

## 2022-08-31 RX ORDER — ROSUVASTATIN CALCIUM 5 MG/1
1 TABLET ORAL
Qty: 0 | Refills: 0 | DISCHARGE

## 2022-08-31 RX ORDER — ATENOLOL 25 MG/1
1 TABLET ORAL
Qty: 0 | Refills: 0 | DISCHARGE

## 2022-08-31 NOTE — ASU PATIENT PROFILE, ADULT - FALL HARM RISK - RISK INTERVENTIONS

## 2022-08-31 NOTE — CHART NOTE - NSCHARTNOTEFT_GEN_A_CORE
POST OPERATIVE PROCEDURAL DOCUMENTATION  PRE-OP DIAGNOSIS: Fibroelastoma    POST-OP DIAGNOSIS: Stable fibroelastoma    PROCEDURE: Transesophageal echocardiogram    Primary Physician: Dr. Lim  Fellow: Dr. Prather    ANESTHESIA TYPE  [  ] General Anesthesia  [ x ] Conscious Sedation  [  ] Local/Regional    CONDITION  [  ] Critical  [  ] Serious  [  ] Fair  [X] Good    SPECIMENS REMOVED (IF APPLICABLE): N/A    IMPLANTS (IF APPLICABLE): None    ESTIMATED BLOOD LOSS: None    COMPLICATIONS: None      FINDINGS:    After risks and benefits of procedures were explained, informed consent was obtained and placed in chart. Refer to Anesthesia note for sedation details.  The TOM probe was passed into the esophagus without difficulty.  Transesophageal and transgastric images were obtained.  The TOM probe was removed without difficulty and examined.  There was no evidence for bleeding.  The patient tolerated the procedure well without any immediate TOM-related complications.      Preliminary Findings:  LA: Mildly enlarged  MARCELLA: Left atrial appendage was clear of clot and smoke.  LV: LVEF was estimated to be normal  MV: Mild to moderate MR  AV:   RA:  TV: no TR.   PV: no PI.   IAS: no PFO. No R-> L shunt.   There was mild, non-mobile atheroma seen in the thoracic aorta.     Patient successfully converted to sinus rhythm with synchronized  ___ J of direct current cardioversion.    DIAGNOSIS/IMPRESSION:    PLAN OF CARE: POST OPERATIVE PROCEDURAL DOCUMENTATION  PRE-OP DIAGNOSIS: Fibroelastoma    POST-OP DIAGNOSIS: Stable fibroelastoma    PROCEDURE: Transesophageal echocardiogram    Primary Physician: Dr. Lim  Fellow: Dr. Prather    ANESTHESIA TYPE  [  ] General Anesthesia  [ x ] Conscious Sedation  [  ] Local/Regional    CONDITION  [  ] Critical  [  ] Serious  [  ] Fair  [X] Good    SPECIMENS REMOVED (IF APPLICABLE): N/A    IMPLANTS (IF APPLICABLE): None    ESTIMATED BLOOD LOSS: None    COMPLICATIONS: None      FINDINGS:    After risks and benefits of procedures were explained, informed consent was obtained and placed in chart. Refer to Anesthesia note for sedation details.  The TOM probe was passed into the esophagus without difficulty.  Transesophageal and transgastric images were obtained.  The TOM probe was removed without difficulty and examined.  There was no evidence for bleeding.  The patient tolerated the procedure well without any immediate TOM-related complications.      Preliminary Findings:  LA: Mildly enlarged  MARCELLA: Left atrial appendage was clear of clot and smoke.  LV: LVEF was estimated to be normal  MV: Mild to moderate MR  AV: Mobile echodensity on leaflet tip of right coronary cusp measuring 1.1 cm which represents stable fibroelastoma of aortic valve compared to prior TEEs; no AI  RA: Mildly enlarged  TV: Mild to moderate TR  PV: Mild PI  IAS: no PFO. No R-> L shunt by color doppler  There was mild, non-mobile atheroma seen in the thoracic aorta.     DIAGNOSIS/IMPRESSION:  - Mobile echodensity on leaflet tip of right coronary cusp measuring 1.1 cm which represents stable fibroelastoma of aortic valve compared to prior TEEs; no AI    PLAN OF CARE:  - D/C home today  - Outpatient follow-up

## 2022-08-31 NOTE — H&P CARDIOLOGY - HISTORY OF PRESENT ILLNESS
77 y.o female patient with PMH of HTN, HLD, stable aortic valve fibroelastoma, breast cancer who presents for TOM for evaluation of fibroelastoma and aortic valve. She doesn't have any complaints    REVIEW OF SYSTEMS:  CONSTITUTIONAL: No weakness, fevers or chills  EYES/ENT: No visual changes;  No vertigo or throat pain   NECK: No pain or stiffness  RESPIRATORY: No cough, wheezing, hemoptysis; SEE HPI  CARDIOVASCULAR: SEE HPI  GASTROINTESTINAL: No abdominal or epigastric pain. No nausea, vomiting, or hematemesis; No diarrhea or constipation. No melena or hematochezia.  GENITOURINARY: No dysuria, frequency or hematuria  NEUROLOGICAL: No numbness or weakness  SKIN: No itching, rashes      PHYSICAL EXAM:  GENERAL: NAD  HEAD:  Atraumatic, Normocephalic  EYES: conjunctiva and sclera clear  NECK: No JVD  CHEST/LUNG: Clear to auscultation bilaterally  HEART: Regular rate and rhythm  ABDOMEN: Soft, Nontender, Nondistended  EXTREMITIES:  No edema  NEUROLOGY:  A&Ox3, appropriate  SKIN: No rashes or lesions

## 2022-09-28 ENCOUNTER — APPOINTMENT (OUTPATIENT)
Dept: NEUROLOGY | Facility: CLINIC | Age: 77
End: 2022-09-28

## 2022-09-28 VITALS — SYSTOLIC BLOOD PRESSURE: 164 MMHG | DIASTOLIC BLOOD PRESSURE: 89 MMHG | HEART RATE: 69 BPM

## 2022-09-28 DIAGNOSIS — M54.16 RADICULOPATHY, LUMBAR REGION: ICD-10-CM

## 2022-09-28 PROCEDURE — 99214 OFFICE O/P EST MOD 30 MIN: CPT

## 2022-09-29 PROBLEM — M54.16 LUMBAR RADICULOPATHY, CHRONIC: Status: ACTIVE | Noted: 2022-08-17

## 2022-09-29 NOTE — ASSESSMENT
[FreeTextEntry1] : chronic cervical radiculopathy- MRI/EMG done.  although there are significant DJD in her cervical spine, but no finding would necessitate neurosurgical treatment at this time.furthermore, considering her age, surgery is most likely not an option.  pain management is the next logical step since she had tried conservative treatment and has not worked.  I have given her names of our pain management doctors, but if she wants to see any outside pain management doctor, she would need a referral from her primary.\par \par Total clinician time spent  is  31 minutes including preparing to see the patient, obtaining and/or reviewing and confirming history, performing a medically necessary and appropriate examination, counseling and educating the patient and/or family, documenting clinical information in the HER and communicating and/or referring to other healthcare professionals.\par \par

## 2022-09-29 NOTE — HISTORY OF PRESENT ILLNESS
[FreeTextEntry1] : Patient presents today for neurological consultation in regard to neck pain. She has had this pain for a few months following no precipitating injury or event. Patient notes that she feels shooting pain in the left arm. She denies any numbness and tingling in the upper extremity. She also notes some shooting pain in the left side of the jaw. Patient notes pain usually rates 5/10 but can occasionally reach up to 10/10. She notes pain is made worse with movement and activity. She denied any prior treatment or imaging. Patient denies taking any medication for symptom control. \par \par Ms. KELSY ROYAL returns to the office for follow-up and her prior history and physical have been reviewed and she reports no change since last visit.  she has been seeing us for chronic cervical radiculopathy.  She recently went for MRI of the cervical spine which showed multiple level of DJD and when compared to the one done in 2012, there was a new disc herniation at the level of  c2-3, increasing grade 1 sponyyloisthesis at c4-5, increasing body ridge formation increasing right ventral cord impression and spinal stenosis at c6-7, and increased grade 1 spondylolistheses at c7-t1 and t1-2.  no spinal cord signal changes seen.   EMG/NCS of the upper extremities was done and showed only very mild bilateral carpal tunnel, but EMG part was not done due to lymphedema.   She still complains of chronic neck pain and therapy has not helped.  she denies any significant weakness or numbess, or uriary/bowel incontinence.  Her back pain has gotten slightly better, and it's not the focus of her treatment at this time.\par \par she wants to see pain management but may want to go to an outside pain management doctor.\par \par \par \par \par

## 2022-10-04 ENCOUNTER — APPOINTMENT (OUTPATIENT)
Dept: PAIN MANAGEMENT | Facility: CLINIC | Age: 77
End: 2022-10-04

## 2022-10-04 VITALS — DIASTOLIC BLOOD PRESSURE: 81 MMHG | HEART RATE: 65 BPM | SYSTOLIC BLOOD PRESSURE: 164 MMHG

## 2022-10-04 DIAGNOSIS — M54.12 RADICULOPATHY, CERVICAL REGION: ICD-10-CM

## 2022-10-04 DIAGNOSIS — M54.2 CERVICALGIA: ICD-10-CM

## 2022-10-04 PROCEDURE — 99204 OFFICE O/P NEW MOD 45 MIN: CPT

## 2022-10-04 NOTE — HISTORY OF PRESENT ILLNESS
[FreeTextEntry1] : This is a 77 year old female complaining of neck pain with occasional pinching sensation into left and right hands. She is being referred to us by Dr. Sterling. MRI shows compression on nerve roots at different levels. There is evidence of spinal stenosis and arthritis as well. She states the pain interrupts her sleeping. She would like to trial conservative methods at this time. The patient has had this pain for 2 months. The pain started after treatment, illness. Patient describes pain as moderate to severe. During the last month the pain has been worse during the morning. Pain described as turning. Bowel or bladder habits have not changed.\par \par ACTIVITIES: Patient could walk 2-3 to blocks before the pain starts. Patient has no pain while sitting. Patient can stand 2 hours before pain starts. The patient still lays down because of pain. Patient uses no assistive device at this time. Patient has difficulty exercising at this time.\par \par PRIOR PAIN TREATMENTS:  No prior pain treatments\par \par PRIOR PAIN MEDICATIONS:  Aspirin, naproxen\par \par \par

## 2022-10-04 NOTE — ASSESSMENT
[FreeTextEntry1] : This is a 77 year old woman complaining of bilateral neck pain  with occasional radicular symptoms in the setting of multilevel disc herniations and stenosis. She is interested in PT for her cervical pain at this time. We will follow up with her in 6 weeks.  should she not get significant relief she may be a candidate for cervical epidural steroid injections.\par \par \par All this patients questions were answered and the conversation was understood well.\par \par Physical therapy of the cervical spine 2-3 times a week for 4-8 weeks stressing a home exercise program of walking, shoulder girdle strengthening,  swimming, elliptical , recumbent bike, Graham chi and Yoga. Use things that heat like hot shower or icy heat before rehab, exercising and at the beginning of the day, and ice (ice in a bag never directly on the skin) after activity and at the end of the day.\par \par \par I, Sarai Oneill, attest that this documentation has been prepared under the direction and in the presence of Provider An Padilla MD.\par \par \par Thank you for allowing me to assist in the management of this patient. \par \par \par Best Regards, \par \par \par An Padilla M.D., FAAPMR\par \par \par Diplomate, American Board of Physical Medicine and Rehabilitation\par Diplomate, American Board of Pain Medicine \par Diplomate, American Board of Pain Management\par

## 2022-10-04 NOTE — DATA REVIEWED
[FreeTextEntry1] : MRI of the cervical spine dated 08/24/2022 shows  C4-5 increasing grade 1 spondylolisthesis related to hypertrophy of the left more than the right facet and there is a diffuse disc bulge now associated with surrounding bony ridge formation which is causing increasing  ventral cord impingement with increase in central spinal stenosis exacerbated by increasing dorsal ligamentous impression on the cord.  There is a foraminal stenosis bilaterally greater left than right with left greater than right C5 nerve root impingement.  This was also present on the left and increased on the right compared to the previous.   C5-6 posterior subligamentous disc herniation surrounding bony ridge formation again impressing on the ventral cord with central spinal stenosis with  broad extension of the disc per peripherally into Estevez bilaterally causing foraminal stenosis and accompanied by  uncovertebral joint hypertrophy  with left greater than right facet hypertrophy with left greater than right C6 nerve root impingement with left greater than right foraminal stenosis.  C6-7 broad-based posterior subligamentous disc herniation in surrounding bony ridge formation extending eccentric to the right increasing with increasing right ventral cord compression and central spinal stenosis.  Broad extension of the disc peripherally toward the proximal left more than right foraminal accompanied by some hypertrophy of the facets and unconverebral joints.  C7-T1 and T1-2 grade 1 spondylolisthesis increased at C7-T1 in.  Increasing hypertrophy of the facets in the dorsal ligamentous impression on the thecal sac at C7-T1 encroaching into the foramina with C8 nerve root impression more so left than right.  C2-3 new right lateral disc herniation extending into the narrowing the right neural foramen  causing right foraminal stenosis with right C3 nerve root impingement with broad extension of the disc posteriorly impressing on the ventral thecal sac which nearly abuts the ventral cord in the midline.  Dorsal ligamentous impression of the on the thecal sac again present.  C3-4 hypertrophy of the right facette with encroachment into the right neural Estevez again present.  A straightened cervical lordosis with a slight increase in straightening particularly extending inferior to C4\par SOAPP: low on 10/04/22\par Low risk: Patient has combination of a low risk SOAP and no risk factors. UDS would be repeated randomly every quarter.\par \par \par UDS: No data obtained today\par \par [place] REGISTRY: Checked\par

## 2022-10-04 NOTE — PHYSICAL EXAM
[Normal Coordination] : normal coordination [Normal DTR UE/LE] : normal DTR UE/LE  [Normal Sensation] : normal sensation [Normal Mood and Affect] : normal mood and affect [Orientated] : orientated [Able to Communicate] : able to communicate [Well Developed] : well developed [Well Nourished] : well nourished [] : no palpable masses

## 2022-12-07 ENCOUNTER — APPOINTMENT (OUTPATIENT)
Dept: CARDIOLOGY | Facility: CLINIC | Age: 77
End: 2022-12-07

## 2022-12-07 VITALS
BODY MASS INDEX: 22.32 KG/M2 | HEIGHT: 63 IN | HEART RATE: 68 BPM | SYSTOLIC BLOOD PRESSURE: 142 MMHG | OXYGEN SATURATION: 95 % | WEIGHT: 126 LBS | TEMPERATURE: 96.7 F | DIASTOLIC BLOOD PRESSURE: 84 MMHG

## 2022-12-07 DIAGNOSIS — I34.0 NONRHEUMATIC MITRAL (VALVE) INSUFFICIENCY: ICD-10-CM

## 2022-12-07 DIAGNOSIS — Z86.79 PERSONAL HISTORY OF OTHER DISEASES OF THE CIRCULATORY SYSTEM: ICD-10-CM

## 2022-12-07 PROCEDURE — 99214 OFFICE O/P EST MOD 30 MIN: CPT

## 2022-12-07 PROCEDURE — 93000 ELECTROCARDIOGRAM COMPLETE: CPT

## 2022-12-07 NOTE — PHYSICAL EXAM
[Well Developed] : well developed [Well Nourished] : well nourished [No Acute Distress] : no acute distress [Normal Conjunctiva] : normal conjunctiva [Normal Venous Pressure] : normal venous pressure [No Carotid Bruit] : no carotid bruit [Normal S1, S2] : normal S1, S2 [No Rub] : no rub [No Gallop] : no gallop [Clear Lung Fields] : clear lung fields [Good Air Entry] : good air entry [No Respiratory Distress] : no respiratory distress  [Soft] : abdomen soft [Non Tender] : non-tender [No Masses/organomegaly] : no masses/organomegaly [Normal Bowel Sounds] : normal bowel sounds [Normal Gait] : normal gait [No Edema] : no edema [No Cyanosis] : no cyanosis [No Clubbing] : no clubbing [No Varicosities] : no varicosities [No Rash] : no rash [No Skin Lesions] : no skin lesions [Moves all extremities] : moves all extremities [No Focal Deficits] : no focal deficits [Normal Speech] : normal speech [Alert and Oriented] : alert and oriented [Normal memory] : normal memory [de-identified] : 1/6 systolic murmur at the apex, 2/6 systolic murmur USBs

## 2022-12-07 NOTE — HISTORY OF PRESENT ILLNESS
[FreeTextEntry1] : Ms. Reyna is a 77-year-old woman with history of papillary fibroelastoma, mitral regurgitation, tricuspid regurgitation, pulmonary hypertension, hyperlipidemia, and breast cancer presenting for follow up.\par \par Patient previously followed with Dr. Quiles and was last seen in office 6/2022.\par Today, she reports generally feeling well without overt cardiopulmonary complaints.\par \par TOM 8/2022\par - Mobile echodensity on the leaflet tip of the RCC measuring 1.1cm which represents stable fibroelastoma of the aortic valve compared to prior TEEs.\par - Mild-moderate AR\par \par TTE 6/2022\par - Normal biventricular function, mild-mod MR, mod pHTN PASP 50mmHg, IASA\par \par Labs:\par - Cr 0.7, K 4.3\par - , HDL 70, TG 64, , non-\par - A1c 5.1%, TSH 1.61\par \par Cardiac Meds:\par - ASA 81mg\par - Atenolol 25mg\par - Rosuvastatin 5mg

## 2022-12-07 NOTE — ASSESSMENT
[FreeTextEntry1] : Ms. 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

## 2022-12-22 ENCOUNTER — APPOINTMENT (OUTPATIENT)
Dept: CARDIOLOGY | Facility: CLINIC | Age: 77
End: 2022-12-22

## 2022-12-22 ENCOUNTER — APPOINTMENT (OUTPATIENT)
Dept: CARDIOTHORACIC SURGERY | Facility: CLINIC | Age: 77
End: 2022-12-22

## 2022-12-22 VITALS
HEIGHT: 63 IN | RESPIRATION RATE: 12 BRPM | WEIGHT: 126 LBS | SYSTOLIC BLOOD PRESSURE: 147 MMHG | TEMPERATURE: 98.7 F | HEART RATE: 81 BPM | BODY MASS INDEX: 22.32 KG/M2 | OXYGEN SATURATION: 97 % | DIASTOLIC BLOOD PRESSURE: 82 MMHG

## 2022-12-22 PROCEDURE — 99213 OFFICE O/P EST LOW 20 MIN: CPT

## 2022-12-29 NOTE — ASSESSMENT
[FreeTextEntry1] : Ms. KELSY ROYAL 77 year F arrives  today for evaluation of their fibroelastoma. Patient PMH include  papillary fibroelastoma, mitral regurgitation, tricuspid regurgitation, pulmonary hypertension, hyperlipidemia, and breast cancer 20 years ago, s/p b/l mastectomy. \par Symptoms are minimal. NYHA class I.. All questions and concerns were addressed with the patient. Pre-op planning was discussed with the patient, including dental clearance. Patient was educated on the risks and alternatives to surgery. \par \par \par Plan:\par #Fibroelastoma\par Patient offered surveillance vs. surgery\par Surgery Recommended but patient adamantly refused \par Educated that risk of stroke is elevated if it is not removed\par \par #HTN\par Continue Current Antihypertensives\par Continue Follow Up with Cardiology \par \par I Henrique Barnett, [facility] am acting as the scribe for Dr. Toney \par \par Patient seen and examined\par History and physical as per NP note\par Briefly, 77 year-old woman with known fibroelastoma but but no history of stroke\par I had a long discussion with the patient regarding the risks and benefits of surgical removal of the fibroelastoma versus ongoing echo surveillance. I mentioned that the mass could be resected through a minimally invasive surgical approach. \par She is considering the options and we will call the patient back to discuss her final decision. \par

## 2022-12-29 NOTE — DATA REVIEWED
[FreeTextEntry1] : TOM 8/2022\par - Mobile echodensity on the leaflet tip of the RCC measuring 1.1cm which represents stable fibroelastoma of the aortic valve compared to prior TEEs.

## 2022-12-29 NOTE — HISTORY OF PRESENT ILLNESS
[FreeTextEntry1] : Ms. KELSY ROYAL 77 year F arrives  today for evaluation of their fibroelastoma. Patient PMH include  papillary fibroelastoma, mitral regurgitation, tricuspid regurgitation, pulmonary hypertension, hyperlipidemia, and breast cancer 20 years ago, s/p b/l mastectomy. Symptoms are minimal. NYHA class I.. All questions and concerns were addressed with the patient.\par \par \par Chemo: 1 year\par Radiation 20 \par Stem Cell Transplant \par \par PMD: Rigo \par Cardio: Christine

## 2023-01-03 ENCOUNTER — NON-APPOINTMENT (OUTPATIENT)
Age: 78
End: 2023-01-03

## 2023-01-17 ENCOUNTER — NON-APPOINTMENT (OUTPATIENT)
Age: 78
End: 2023-01-17

## 2023-02-01 ENCOUNTER — NON-APPOINTMENT (OUTPATIENT)
Age: 78
End: 2023-02-01

## 2023-02-23 NOTE — ASU PATIENT PROFILE, ADULT - NS PRO MODE OF ARRIVAL
Check fbs/a1c- had to eat out for 1 month in jan as construction was underway in his home- he is anticipating higher glucose levels- last exam 1.5 years ago he had a nl a1c, prior to that it was about 6. Low carb diet/weight reduction/exercise   ambulatory

## 2023-03-17 NOTE — DISCUSSION/SUMMARY
Addended by: ENOCH ZIMMERMAN on: 3/17/2023 09:41 AM     Modules accepted: Orders     [EKG obtained to assist in diagnosis and management of assessed problem(s)] : EKG obtained to assist in diagnosis and management of assessed problem(s)

## 2023-06-01 ENCOUNTER — APPOINTMENT (OUTPATIENT)
Dept: CARDIOLOGY | Facility: CLINIC | Age: 78
End: 2023-06-01
Payer: MEDICARE

## 2023-06-01 PROCEDURE — 93306 TTE W/DOPPLER COMPLETE: CPT

## 2023-06-08 ENCOUNTER — APPOINTMENT (OUTPATIENT)
Dept: CARDIOLOGY | Facility: CLINIC | Age: 78
End: 2023-06-08
Payer: MEDICARE

## 2023-06-08 VITALS
SYSTOLIC BLOOD PRESSURE: 124 MMHG | BODY MASS INDEX: 22.15 KG/M2 | DIASTOLIC BLOOD PRESSURE: 80 MMHG | WEIGHT: 125 LBS | HEIGHT: 63 IN | HEART RATE: 65 BPM

## 2023-06-08 PROCEDURE — 99214 OFFICE O/P EST MOD 30 MIN: CPT | Mod: 25

## 2023-06-08 PROCEDURE — 93000 ELECTROCARDIOGRAM COMPLETE: CPT

## 2023-06-08 NOTE — ASSESSMENT
[FreeTextEntry1] : Ms. 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 She denies anginal complaints and does not exhibit nor endorse any signs/symptoms of heart failure. Recent TTE demonstrated normal systolic function with no significant valvular disease. As such, she may proceed with CTSx without further cardiopulmonary testing or intervention.\par - Continue statin\par - OK to continue aspirin for now; can hold 5-7 days before surgery if necessary\par \par RTC after surgery.

## 2023-06-08 NOTE — PHYSICAL EXAM
[Well Developed] : well developed [Well Nourished] : well nourished [No Acute Distress] : no acute distress [Normal Conjunctiva] : normal conjunctiva [Normal Venous Pressure] : normal venous pressure [No Carotid Bruit] : no carotid bruit [Normal S1, S2] : normal S1, S2 [No Rub] : no rub [No Gallop] : no gallop [Clear Lung Fields] : clear lung fields [Good Air Entry] : good air entry [No Respiratory Distress] : no respiratory distress  [Soft] : abdomen soft [Non Tender] : non-tender [No Masses/organomegaly] : no masses/organomegaly [Normal Bowel Sounds] : normal bowel sounds [Normal Gait] : normal gait [No Edema] : no edema [No Cyanosis] : no cyanosis [No Clubbing] : no clubbing [No Varicosities] : no varicosities [No Rash] : no rash [No Skin Lesions] : no skin lesions [Moves all extremities] : moves all extremities [No Focal Deficits] : no focal deficits [Normal Speech] : normal speech [Alert and Oriented] : alert and oriented [Normal memory] : normal memory [de-identified] : 1/6 systolic murmur at the apex, 2/6 systolic murmur USBs

## 2023-06-08 NOTE — HISTORY OF PRESENT ILLNESS
[FreeTextEntry1] : Ms. Reyna is a 77-year-old woman with history of papillary fibroelastoma, mitral regurgitation, tricuspid regurgitation, pulmonary hypertension, hyperlipidemia, and breast cancer presenting for follow up.\par \par Patient previously followed with Dr. Quiles and was last seen in office 6/2022.\par Today, she reports generally feeling well without overt cardiopulmonary complaints. She was seen by the CTSx team at [facility] and is planned for minimally invasive fibroelastoma removal next month.\par \par She states she is able to climb >2 flights of stairs without taking a break and denies active cardiopulmonary complaints.\par \par TOM 8/2022\par - Mobile echodensity on the leaflet tip of the RCC measuring 1.1cm which represents stable fibroelastoma of the aortic valve compared to prior TEEs.\par - Mild-moderate AR\par \par TTE 6/2022 - Normal biventricular function, mild-mod MR, mod pHTN PASP 50mmHg, IASA\par TTE 6/2023 - EF 60%, indeterminate diastolic function, billowing mitral leaflets without prolapse, focal calcification of the anterior leaflet, mild MR, fibrocalcific Ao valve, fibroelastoma not well visualized\par \par NM Adenosine Stress 2018\par - Negative for ischemia\par \par Labs:\par - pBNP 160 (<100)\par - Cr 0.68, K 4.4\par - , HDL 74, TG 48, LDL 59, non-HDL 73\par - Prior , HDL 70, TG 64, , non-\par - A1c 5.4%, TSH 1.47, CRP 0.5 (<8)\par \par Cardiac Meds:\par - ASA 81mg\par - Atenolol 25mg\par - Rosuvastatin 5mg

## 2023-06-09 ENCOUNTER — APPOINTMENT (OUTPATIENT)
Dept: ORTHOPEDIC SURGERY | Facility: CLINIC | Age: 78
End: 2023-06-09
Payer: MEDICARE

## 2023-06-09 PROCEDURE — 73140 X-RAY EXAM OF FINGER(S): CPT | Mod: RT

## 2023-06-09 PROCEDURE — 99205 OFFICE O/P NEW HI 60 MIN: CPT

## 2023-06-11 NOTE — ASSESSMENT
[FreeTextEntry1] : The patient comes in with a small bump on her right index finger. She noticed this a few months ago. She has history of arthritis. The patient is having cardiac surgery in July. 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 Today the patient is amenable to surgical excision,  Understands that with volar masses chance of arterial injury which may result in ecchymosis and occasional hematoma, recurrence, stiffness due to dissection to the joint capsule as well as general risks of surgery- bleeding, infection, injury to nerves, vessels or tendons, need for future surgery, loss of limb, loss of life. all questions answered and patient agrees to the surgical plan.  post op protocol and expectations were reviewed.\par \par

## 2023-06-30 ENCOUNTER — APPOINTMENT (OUTPATIENT)
Dept: ORTHOPEDIC SURGERY | Facility: CLINIC | Age: 78
End: 2023-06-30

## 2023-08-06 ENCOUNTER — INPATIENT (INPATIENT)
Facility: HOSPITAL | Age: 78
LOS: 5 days | Discharge: HOME HEALTH | DRG: 602 | End: 2023-08-12
Attending: INTERNAL MEDICINE | Admitting: HOSPITALIST
Payer: MEDICARE

## 2023-08-06 VITALS
DIASTOLIC BLOOD PRESSURE: 67 MMHG | WEIGHT: 149.91 LBS | RESPIRATION RATE: 18 BRPM | HEART RATE: 83 BPM | SYSTOLIC BLOOD PRESSURE: 143 MMHG | OXYGEN SATURATION: 100 % | TEMPERATURE: 100 F

## 2023-08-06 DIAGNOSIS — Z90.13 ACQUIRED ABSENCE OF BILATERAL BREASTS AND NIPPLES: Chronic | ICD-10-CM

## 2023-08-06 DIAGNOSIS — Z96.642 PRESENCE OF LEFT ARTIFICIAL HIP JOINT: Chronic | ICD-10-CM

## 2023-08-06 DIAGNOSIS — Z96.643 PRESENCE OF ARTIFICIAL HIP JOINT, BILATERAL: Chronic | ICD-10-CM

## 2023-08-06 PROCEDURE — 93970 EXTREMITY STUDY: CPT | Mod: 26

## 2023-08-06 PROCEDURE — 73630 X-RAY EXAM OF FOOT: CPT | Mod: 26,RT

## 2023-08-06 PROCEDURE — 99285 EMERGENCY DEPT VISIT HI MDM: CPT | Mod: FS

## 2023-08-06 PROCEDURE — 71045 X-RAY EXAM CHEST 1 VIEW: CPT | Mod: 26

## 2023-08-06 PROCEDURE — 93010 ELECTROCARDIOGRAM REPORT: CPT

## 2023-08-06 RX ORDER — CEFTRIAXONE 500 MG/1
2000 INJECTION, POWDER, FOR SOLUTION INTRAMUSCULAR; INTRAVENOUS ONCE
Refills: 0 | Status: COMPLETED | OUTPATIENT
Start: 2023-08-06 | End: 2023-08-06

## 2023-08-06 RX ORDER — ACETAMINOPHEN 500 MG
650 TABLET ORAL ONCE
Refills: 0 | Status: COMPLETED | OUTPATIENT
Start: 2023-08-06 | End: 2023-08-07

## 2023-08-06 RX ORDER — METRONIDAZOLE 500 MG
500 TABLET ORAL ONCE
Refills: 0 | Status: COMPLETED | OUTPATIENT
Start: 2023-08-06 | End: 2023-08-06

## 2023-08-06 RX ADMIN — CEFTRIAXONE 100 MILLIGRAM(S): 500 INJECTION, POWDER, FOR SOLUTION INTRAMUSCULAR; INTRAVENOUS at 23:54

## 2023-08-06 RX ADMIN — Medication 100 MILLIGRAM(S): at 23:53

## 2023-08-06 NOTE — ED PROVIDER NOTE - PHYSICAL EXAMINATION
CONST: Febrile, NAD  CARD: S1 S2; No jvd  RESP: Equal BS B/L, No wheezes, rhonchi or rales. No distress  GI: Soft, non-tender, non-distended. no cva tenderness. normal BS  MS: Normal ROM in all extremities. pulses 2 +. B/L LE swelling  SKIN: ulcerative wound to dorsum of R foot with surrounding erythema  NEURO: A&Ox3, No focal deficits. Strength 5/5 with no sensory deficits.

## 2023-08-06 NOTE — ED ADULT TRIAGE NOTE - CHIEF COMPLAINT QUOTE
Pt BIBA from home for right leg swelling that has been oozing x 1 week. Pt states she has had a fever at home

## 2023-08-06 NOTE — ED PROVIDER NOTE - NSICDXPASTMEDICALHX_GEN_ALL_CORE_FT
PAST MEDICAL HISTORY:  Breast cancer     Hayfever     HTN (hypertension)     Nonrheumatic mitral valve insufficiency     Papillary fibroelastoma of heart

## 2023-08-06 NOTE — ED PROVIDER NOTE - CARE PLAN
1 Principal Discharge DX:	Cellulitis  Secondary Diagnosis:	Fluid overload   Principal Discharge DX:	Cellulitis  Secondary Diagnosis:	Fluid overload  Secondary Diagnosis:	Abnormal EKG

## 2023-08-06 NOTE — ED PROVIDER NOTE - NS ED ATTENDING STATEMENT MOD
This was a shared visit with the [place]. I reviewed and verified the documentation and independently performed the documented:

## 2023-08-06 NOTE — ED PROVIDER NOTE - OBJECTIVE STATEMENT
78-year-old male past medical history hypertension, hyperlipidemia, status post aortic valve fibroblastoma removal on 7/12 presents for evaluation of right lower extremity.  Patient endorses bilateral lower extremity swelling status post surgery in development of right foot blister that is since opened with surrounding redness.  Today visiting nurse noted patient to have fever.  Denies headache, neck pain, chest pain, shortness of breath, weakness, numbness, dysuria, hematuria, vomiting, diarrhea.

## 2023-08-06 NOTE — ED PROVIDER NOTE - CLINICAL SUMMARY MEDICAL DECISION MAKING FREE TEXT BOX
78-year-old female with PMHx as noted, in ER with c/o RLE redness, swelling, now febrile.  Also having swelling to LLE and decreased exercise tolerance/BESS; s/p recent surgery for removal of aortic fibroelastoma last month at [facility].  Denies any CP.  On p.o. Lasix with no improvement and LE swelling.  Labs reviewed: WBC 7, Hgb 11.0, CMP unremarkable.  Lactate negative.  Troponin negative.  BNP 2/3/2002.  EKG  - NSR @ 77, TWI inf leads, TWI/ST dep V4-V6 (different from prior EKG). LE duplex negative for DVT. CXR - R lung fibrotic changes.  R foot x-ray: + STS. patient culture, given IV antibiotics, as well as Lasix.  Minutes telemetry for continued treatment of cellulitis as well as cardiology evaluation for abnormal EKG.

## 2023-08-06 NOTE — ED ADULT NURSE NOTE - NSFALLUNIVINTERV_ED_ALL_ED
Bed/Stretcher in lowest position, wheels locked, appropriate side rails in place/Call bell, personal items and telephone in reach/Instruct patient to call for assistance before getting out of bed/chair/stretcher/Non-slip footwear applied when patient is off stretcher/[place] to call system/Physically safe environment - no spills, clutter or unnecessary equipment/Purposeful proactive rounding/Room/bathroom lighting operational, light cord in reach

## 2023-08-06 NOTE — ED ADULT NURSE NOTE - SUICIDE SCREENING DEPRESSION
----- Message from Hanna Boucher sent at 8/18/2022  2:10 PM CDT -----  Regarding: sooner  Contact: [phone]  Type:  Sooner Apoointment Request    Caller is requesting a sooner appointment.  Caller declined first available appointment listed below.  Caller will not accept being placed on the waitlist and is requesting a message be sent to doctor.  Name of Caller: self   When is the first available appointment?   Symptoms: a painful lump under her left arm  Would the patient rather a call back or a response via [facility]?  Call her after 4pm today  Best Call Back Number: [phone]  Additional Information:           
I attempted to call , voicemail is full..  
Negative

## 2023-08-06 NOTE — ED PROVIDER NOTE - ATTENDING APP SHARED VISIT CONTRIBUTION OF CARE
78-year-old female with h/o HTN, HLD, aortic fibroelastoma s/p resection last month at [facility], breast CA > 20 yrs ago s/p b/l mastectomy, ER with c/o increased redness/swelling to R foot.  Patient states since having her surgery, she has developed b/l  LE edema (was given rx for lasix by her surgeon).  pt states upon being discharged home, she developed a blister to R foot, which opened up ~3 weeks ago.  Has been following with [facility], applying topical antibiotic cream.  Initially was looking okay, but states for the past few days has become increasingly swollen and red.  Developed temp to 100.2 today.  Denies any calf pain.  No abdominal pain.  No CP.  + Mildly decreased exercise tolerance.  No SOB at at rest.  No cough.  No N/V/D.  No HA/dizziness/syncope.  PE - nad, nc/at, eomi, perrl, op - clear, mmm, neck supple, cta b/l, no w/r/r, rrr, abd- soft, nt/nd, nabs, from x 4,+ b/lLE edema R>L, R foot with erythema/warmth, distal foot with blistering, 2+ dp pulse, sensation intact, A&O x 3, cn 2-12 intact, no focal motor/sensory deficits.  -check labs, LE duplex, iv abx, will need admission. 78-year-old female with h/o HTN, HLD, aortic fibroelastoma s/p resection last month at [facility] (per pt, surgery complicated by presence of R lung scar tissue and ?pleural adherence to chest wall), breast CA > 20 yrs ago s/p b/l mastectomy, in ER with c/o increased redness/swelling to R foot.  Patient states since having her surgery, she has developed b/l  LE edema (was given rx for lasix by her surgeon).  pt states upon being discharged home, she developed a blister to R foot, which opened up ~3 weeks ago.  Has been following with [facility]NS, applying topical antibiotic cream.  Initially looked ok, but states for the past few days has become increasingly swollen and red.  Developed temp to 100.2 today.  Denies any calf pain.  No abdominal pain.  No CP.  + Mildly decreased exercise tolerance.  No SOB at at rest.  No cough.  No N/V/D.  No HA/dizziness/syncope. Follows with Dr. Christine garsia.   PE - nad, nc/at, eomi, perrl, op - clear, mmm, neck supple, cta b/l, no w/r/r, rrr, abd- soft, nt/nd, nabs, from x 4,+ b/lLE edema R>L, R foot with erythema/warmth, distal foot with blistering, 2+ dp pulse, sensation intact, A&O x 3, cn 2-12 intact, no focal motor/sensory deficits.  -check labs, LE duplex, iv abx, will need admission.

## 2023-08-07 DIAGNOSIS — E87.70 FLUID OVERLOAD, UNSPECIFIED: ICD-10-CM

## 2023-08-07 DIAGNOSIS — R09.89 OTHER SPECIFIED SYMPTOMS AND SIGNS INVOLVING THE CIRCULATORY AND RESPIRATORY SYSTEMS: ICD-10-CM

## 2023-08-07 LAB
ALBUMIN SERPL ELPH-MCNC: 3.9 G/DL — SIGNIFICANT CHANGE UP (ref 3.5–5.2)
ALP SERPL-CCNC: 66 U/L — SIGNIFICANT CHANGE UP (ref 30–115)
ALT FLD-CCNC: 25 U/L — SIGNIFICANT CHANGE UP (ref 0–41)
ANION GAP SERPL CALC-SCNC: 12 MMOL/L — SIGNIFICANT CHANGE UP (ref 7–14)
AST SERPL-CCNC: 28 U/L — SIGNIFICANT CHANGE UP (ref 0–41)
BASOPHILS # BLD AUTO: 0.01 K/UL — SIGNIFICANT CHANGE UP (ref 0–0.2)
BASOPHILS NFR BLD AUTO: 0.1 % — SIGNIFICANT CHANGE UP (ref 0–1)
BILIRUB SERPL-MCNC: 1.1 MG/DL — SIGNIFICANT CHANGE UP (ref 0.2–1.2)
BUN SERPL-MCNC: 20 MG/DL — SIGNIFICANT CHANGE UP (ref 10–20)
CALCIUM SERPL-MCNC: 9.6 MG/DL — SIGNIFICANT CHANGE UP (ref 8.4–10.5)
CHLORIDE SERPL-SCNC: 98 MMOL/L — SIGNIFICANT CHANGE UP (ref 98–110)
CO2 SERPL-SCNC: 28 MMOL/L — SIGNIFICANT CHANGE UP (ref 17–32)
CREAT SERPL-MCNC: 0.9 MG/DL — SIGNIFICANT CHANGE UP (ref 0.7–1.5)
EGFR: 65 ML/MIN/1.73M2 — SIGNIFICANT CHANGE UP
EOSINOPHIL # BLD AUTO: 0.02 K/UL — SIGNIFICANT CHANGE UP (ref 0–0.7)
EOSINOPHIL NFR BLD AUTO: 0.3 % — SIGNIFICANT CHANGE UP (ref 0–8)
GLUCOSE SERPL-MCNC: 125 MG/DL — HIGH (ref 70–99)
HCT VFR BLD CALC: 34.6 % — LOW (ref 37–47)
HGB BLD-MCNC: 11 G/DL — LOW (ref 12–16)
IMM GRANULOCYTES NFR BLD AUTO: 0.3 % — SIGNIFICANT CHANGE UP (ref 0.1–0.3)
LACTATE SERPL-SCNC: 1.3 MMOL/L — SIGNIFICANT CHANGE UP (ref 0.7–2)
LYMPHOCYTES # BLD AUTO: 0.44 K/UL — LOW (ref 1.2–3.4)
LYMPHOCYTES # BLD AUTO: 5.8 % — LOW (ref 20.5–51.1)
MCHC RBC-ENTMCNC: 30.5 PG — SIGNIFICANT CHANGE UP (ref 27–31)
MCHC RBC-ENTMCNC: 31.8 G/DL — LOW (ref 32–37)
MCV RBC AUTO: 95.8 FL — SIGNIFICANT CHANGE UP (ref 81–99)
MONOCYTES # BLD AUTO: 0.54 K/UL — SIGNIFICANT CHANGE UP (ref 0.1–0.6)
MONOCYTES NFR BLD AUTO: 7.2 % — SIGNIFICANT CHANGE UP (ref 1.7–9.3)
NEUTROPHILS # BLD AUTO: 6.51 K/UL — HIGH (ref 1.4–6.5)
NEUTROPHILS NFR BLD AUTO: 86.3 % — HIGH (ref 42.2–75.2)
NRBC # BLD: 0 /100 WBCS — SIGNIFICANT CHANGE UP (ref 0–0)
NT-PROBNP SERPL-SCNC: 2302 PG/ML — HIGH (ref 0–300)
PLATELET # BLD AUTO: 309 K/UL — SIGNIFICANT CHANGE UP (ref 130–400)
PMV BLD: 9.2 FL — SIGNIFICANT CHANGE UP (ref 7.4–10.4)
POTASSIUM SERPL-MCNC: 5 MMOL/L — SIGNIFICANT CHANGE UP (ref 3.5–5)
POTASSIUM SERPL-SCNC: 5 MMOL/L — SIGNIFICANT CHANGE UP (ref 3.5–5)
PROT SERPL-MCNC: 6.6 G/DL — SIGNIFICANT CHANGE UP (ref 6–8)
RBC # BLD: 3.61 M/UL — LOW (ref 4.2–5.4)
RBC # FLD: 14.5 % — SIGNIFICANT CHANGE UP (ref 11.5–14.5)
SODIUM SERPL-SCNC: 138 MMOL/L — SIGNIFICANT CHANGE UP (ref 135–146)
TROPONIN T SERPL-MCNC: <0.01 NG/ML — SIGNIFICANT CHANGE UP
WBC # BLD: 7.54 K/UL — SIGNIFICANT CHANGE UP (ref 4.8–10.8)
WBC # FLD AUTO: 7.54 K/UL — SIGNIFICANT CHANGE UP (ref 4.8–10.8)

## 2023-08-07 PROCEDURE — 99223 1ST HOSP IP/OBS HIGH 75: CPT

## 2023-08-07 PROCEDURE — 87116 MYCOBACTERIA CULTURE: CPT

## 2023-08-07 PROCEDURE — 93306 TTE W/DOPPLER COMPLETE: CPT | Mod: 26

## 2023-08-07 PROCEDURE — 85652 RBC SED RATE AUTOMATED: CPT

## 2023-08-07 PROCEDURE — 87077 CULTURE AEROBIC IDENTIFY: CPT

## 2023-08-07 PROCEDURE — 85025 COMPLETE CBC W/AUTO DIFF WBC: CPT

## 2023-08-07 PROCEDURE — 97116 GAIT TRAINING THERAPY: CPT | Mod: GP

## 2023-08-07 PROCEDURE — 86140 C-REACTIVE PROTEIN: CPT

## 2023-08-07 PROCEDURE — 85610 PROTHROMBIN TIME: CPT

## 2023-08-07 PROCEDURE — 86900 BLOOD TYPING SEROLOGIC ABO: CPT

## 2023-08-07 PROCEDURE — 85730 THROMBOPLASTIN TIME PARTIAL: CPT

## 2023-08-07 PROCEDURE — 87040 BLOOD CULTURE FOR BACTERIA: CPT

## 2023-08-07 PROCEDURE — 80202 ASSAY OF VANCOMYCIN: CPT

## 2023-08-07 PROCEDURE — 88302 TISSUE EXAM BY PATHOLOGIST: CPT

## 2023-08-07 PROCEDURE — 86901 BLOOD TYPING SEROLOGIC RH(D): CPT

## 2023-08-07 PROCEDURE — 97161 PT EVAL LOW COMPLEX 20 MIN: CPT | Mod: GP

## 2023-08-07 PROCEDURE — 86850 RBC ANTIBODY SCREEN: CPT

## 2023-08-07 PROCEDURE — 87186 SC STD MICRODIL/AGAR DIL: CPT

## 2023-08-07 PROCEDURE — 97530 THERAPEUTIC ACTIVITIES: CPT | Mod: GP

## 2023-08-07 PROCEDURE — 87102 FUNGUS ISOLATION CULTURE: CPT

## 2023-08-07 PROCEDURE — 93005 ELECTROCARDIOGRAM TRACING: CPT

## 2023-08-07 PROCEDURE — 87075 CULTR BACTERIA EXCEPT BLOOD: CPT

## 2023-08-07 PROCEDURE — 87070 CULTURE OTHR SPECIMN AEROBIC: CPT

## 2023-08-07 PROCEDURE — 36415 COLL VENOUS BLD VENIPUNCTURE: CPT

## 2023-08-07 PROCEDURE — 93306 TTE W/DOPPLER COMPLETE: CPT

## 2023-08-07 PROCEDURE — 83735 ASSAY OF MAGNESIUM: CPT

## 2023-08-07 PROCEDURE — 80053 COMPREHEN METABOLIC PANEL: CPT

## 2023-08-07 PROCEDURE — 87206 SMEAR FLUORESCENT/ACID STAI: CPT

## 2023-08-07 RX ORDER — FUROSEMIDE 40 MG
20 TABLET ORAL DAILY
Refills: 0 | Status: DISCONTINUED | OUTPATIENT
Start: 2023-08-07 | End: 2023-08-10

## 2023-08-07 RX ORDER — ATENOLOL 25 MG/1
25 TABLET ORAL DAILY
Refills: 0 | Status: DISCONTINUED | OUTPATIENT
Start: 2023-08-07 | End: 2023-08-07

## 2023-08-07 RX ORDER — FUROSEMIDE 40 MG
40 TABLET ORAL DAILY
Refills: 0 | Status: DISCONTINUED | OUTPATIENT
Start: 2023-08-07 | End: 2023-08-07

## 2023-08-07 RX ORDER — ATORVASTATIN CALCIUM 80 MG/1
20 TABLET, FILM COATED ORAL AT BEDTIME
Refills: 0 | Status: DISCONTINUED | OUTPATIENT
Start: 2023-08-07 | End: 2023-08-10

## 2023-08-07 RX ORDER — VANCOMYCIN HCL 1 G
1000 VIAL (EA) INTRAVENOUS EVERY 24 HOURS
Refills: 0 | Status: DISCONTINUED | OUTPATIENT
Start: 2023-08-07 | End: 2023-08-07

## 2023-08-07 RX ORDER — VANCOMYCIN HCL 1 G
1000 VIAL (EA) INTRAVENOUS EVERY 12 HOURS
Refills: 0 | Status: DISCONTINUED | OUTPATIENT
Start: 2023-08-07 | End: 2023-08-07

## 2023-08-07 RX ORDER — FUROSEMIDE 40 MG
1 TABLET ORAL
Refills: 0 | DISCHARGE

## 2023-08-07 RX ORDER — CEFTRIAXONE 500 MG/1
1000 INJECTION, POWDER, FOR SOLUTION INTRAMUSCULAR; INTRAVENOUS EVERY 24 HOURS
Refills: 0 | Status: DISCONTINUED | OUTPATIENT
Start: 2023-08-07 | End: 2023-08-08

## 2023-08-07 RX ORDER — AMIODARONE HYDROCHLORIDE 400 MG/1
1 TABLET ORAL
Refills: 0 | DISCHARGE

## 2023-08-07 RX ORDER — METRONIDAZOLE 500 MG
500 TABLET ORAL EVERY 8 HOURS
Refills: 0 | Status: DISCONTINUED | OUTPATIENT
Start: 2023-08-07 | End: 2023-08-08

## 2023-08-07 RX ORDER — FUROSEMIDE 40 MG
40 TABLET ORAL ONCE
Refills: 0 | Status: COMPLETED | OUTPATIENT
Start: 2023-08-07 | End: 2023-08-07

## 2023-08-07 RX ORDER — ENOXAPARIN SODIUM 100 MG/ML
40 INJECTION SUBCUTANEOUS EVERY 24 HOURS
Refills: 0 | Status: DISCONTINUED | OUTPATIENT
Start: 2023-08-07 | End: 2023-08-10

## 2023-08-07 RX ORDER — CEFEPIME 1 G/1
2000 INJECTION, POWDER, FOR SOLUTION INTRAMUSCULAR; INTRAVENOUS EVERY 8 HOURS
Refills: 0 | Status: DISCONTINUED | OUTPATIENT
Start: 2023-08-07 | End: 2023-08-07

## 2023-08-07 RX ORDER — VANCOMYCIN HCL 1 G
1000 VIAL (EA) INTRAVENOUS EVERY 24 HOURS
Refills: 0 | Status: DISCONTINUED | OUTPATIENT
Start: 2023-08-07 | End: 2023-08-08

## 2023-08-07 RX ADMIN — ATENOLOL 25 MILLIGRAM(S): 25 TABLET ORAL at 05:27

## 2023-08-07 RX ADMIN — Medication 40 MILLIGRAM(S): at 07:05

## 2023-08-07 RX ADMIN — Medication 40 MILLIGRAM(S): at 01:55

## 2023-08-07 RX ADMIN — CEFTRIAXONE 100 MILLIGRAM(S): 500 INJECTION, POWDER, FOR SOLUTION INTRAMUSCULAR; INTRAVENOUS at 10:45

## 2023-08-07 RX ADMIN — ATORVASTATIN CALCIUM 20 MILLIGRAM(S): 80 TABLET, FILM COATED ORAL at 22:50

## 2023-08-07 RX ADMIN — Medication 250 MILLIGRAM(S): at 17:29

## 2023-08-07 RX ADMIN — Medication 100 MILLIGRAM(S): at 13:26

## 2023-08-07 RX ADMIN — Medication 100 MILLIGRAM(S): at 22:46

## 2023-08-07 NOTE — PHARMACOTHERAPY INTERVENTION NOTE - COMMENTS
In order to perform vancomycin pharmacokinetic monitoring, updated patient's height for this admission to 160 cm, as height was previously documented as 160 cm per [facility], and there was no height documented for this admission.    Sean Guevara, PharmD, [facility]  Clinical Pharmacy Specialist, Infectious Diseases  Tele-Antimicrobial Stewardship Program (Tele-ASP)  Tele-ASP Phone: [phone]

## 2023-08-07 NOTE — H&P ADULT - ASSESSMENT
Ms. 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 Patient endorses onset of bilateral lower extremity swelling status post aortic valve fibroblastoma s/p removal. Pt noticed the development of a right foot blister that presented last week and has since opened and developed surrounding redness. Her physicians at [facility] prescribed topical abx ointment which did not improve her symptoms. Today her visiting nurse noted patient to have a fever of 101 and the pt made the decision to present to the ED.     #Right LE Cellulitis  #r/o Osteomyelitis (unlikely)  - Sepsis not present on admission  - check bcx  - elevate extremity  - c/w Vancomycin and Ceftriaxone for empiric therapy  - f/u R foot X-ray  - ESR/CRP    #Papillary aortic valve fibroblastoma s/p removal on 7/12/23  #Acute CHF  #Mitral regurgitation  #Tricuspid regurgitation  #Pulmonary hypertension  #Hypertension  -     #Hyperlipidemia  - on Crestor at home; c/w Atorvastatin   Ms. 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 Patient endorses onset of bilateral lower extremity swelling status post aortic valve fibroblastoma s/p removal. Pt noticed the development of a right foot blister that presented last week and has since opened and developed surrounding redness. Her physicians at [facility] prescribed topical abx ointment which did not improve her symptoms. Today her visiting nurse noted patient to have a fever of 101 and the pt made the decision to present to the ED.     #Right LE Cellulitis  #r/o Osteomyelitis (unlikely)  - Sepsis not present on admission  - check bcx  - elevate extremity  - c/w Vancomycin and Ceftriaxone for empiric therapy  - f/u R foot X-ray  - ESR/CRP    #Papillary aortic valve fibroblastoma s/p removal on 7/12/23  #Acute CHF  #Mitral regurgitation  #Tricuspid regurgitation  #Pulmonary hypertension  #Hypertension  - BNP 2303; b.l LE +2 pitting edema  - Pt states legs developed edema s/p fibroelastoma removal; She was started on Lasix 20mg by [facility]  - Pt was also started on Amiodarone 200mg qd and Methylprednisolone 4mg qd but is unsure why  - EKG NSR, nad, twi inf leads, slight st dep V4-V6   - Obtain TTE  - c/w Lasix  - f/u offical chest x-ray read; There is a right middle lung zone opacity and moderate sized right pleural effusion/opacity. Pt states that she has a Hx of right lung scaring but these findings are not present previous chest x-ray on PACS from 2021.    #Questionable Pneumonia  - Not septic on admission; denies CP, SOB, cough  - f/u official chest x-ray read; There is a right middle lung zone opacity and moderate sized right pleural effusion/opacity. Pt states that she has a Hx of right lung scaring but these findings are not present previous chest x-ray on PACS from 2021.  - c/w Vancomycin and Ceftriaxone for now as they are also being used for epimeric OM therapy  - Consider Thoracentesis for Unilateral right sided pleural effusion    #Hyperlipidemia  - on Crestor at home; c/w Atorvastatin    #Misc  - DVT Prophylaxis: Lovenox  - GI Prophylaxis: Not Needed  - Diet: DASH  - Activity: IAT  - Code Status: Full         Ms. 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 Patient endorses onset of bilateral lower extremity swelling status post aortic valve fibroblastoma s/p removal. Pt noticed the development of a right foot blister that presented last week and has since opened and developed surrounding redness. Her physicians at [facility] prescribed topical abx ointment which did not improve her symptoms. Today her visiting nurse noted patient to have a fever of 101 and the pt made the decision to present to the ED.     #Right LE Cellulitis  #r/o Osteomyelitis (unlikely)  - Sepsis not present on admission  - check bcx  - elevate extremity  - c/w Vancomycin and Cefepime for empiric therapy  - f/u R foot X-ray  - ESR/CRP    #Papillary aortic valve fibroblastoma s/p removal on 7/12/23  #Acute CHF  #Mitral regurgitation  #Tricuspid regurgitation  #Pulmonary hypertension  #Hypertension  - BNP 2303; b/l LE +2 pitting edema  - Pt states legs developed edema s/p fibroelastoma removal; She was started on Lasix 20mg by [facility]  - Pt was also started on Amiodarone 200mg qd and Methylprednisolone 4mg qd but is unsure why. PLEASE obtain records from [facility].  - EKG NSR, nad, twi inf leads, slight st dep V4-V6   - Last TTE 8/2022: Normal global left ventricular systolic function.  - Obtain TTE  - c/w Lasix 40mg   - f/u offical chest x-ray read; There is a right middle lung zone opacity and moderate sized right pleural effusion/opacity. Pt states that she has a Hx of right lung scaring but these findings are not present previous chest x-ray on PACS from 2021.    #Questionable Pneumonia  - Not septic on admission; denies CP, SOB, cough  - f/u official chest x-ray read; There is a right middle lung zone opacity and moderate sized right pleural effusion/opacity. Pt states that she has a Hx of right lung scaring but these findings are not present previous chest x-ray on PACS from 2021.  - c/w Vancomycin and Cefepime for now as they are also being used for epimeric OM therapy; add Doxycycline for atypical coverage  - Consider Thoracentesis for Unilateral right sided pleural effusion    #Hyperlipidemia  - on Crestor at home; c/w Atorvastatin    #Misc  - DVT Prophylaxis: Lovenox  - GI Prophylaxis: Not Needed  - Diet: DASH  - Activity: IAT  - Code Status: Full         Ms. 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 Patient endorses onset of bilateral lower extremity swelling status post aortic valve fibroblastoma s/p removal. Pt noticed the development of a right foot blister that presented last week and has since opened and developed surrounding redness. Her physicians at [facility] prescribed topical abx ointment which did not improve her symptoms. Today her visiting nurse noted patient to have a fever of 101 and the pt made the decision to present to the ED.     #Right LE Cellulitis  #r/o Osteomyelitis (unlikely)  - Sepsis not present on admission  - check bcx  - elevate extremity  - c/w Vancomycin and Cefepime for empiric therapy  - f/u R foot X-ray  - ESR/CRP    #Papillary aortic valve fibroblastoma s/p removal on 7/12/23  #Acute CHF  #Mitral regurgitation  #Tricuspid regurgitation  #Pulmonary hypertension  #Hypertension  - BNP 2303; b/l LE +2 pitting edema  - Pt states legs developed edema s/p fibroelastoma removal; She was started on Lasix 20mg by [facility]  - Pt was also started on Amiodarone 200mg qd and Methylprednisolone 4mg qd but is unsure why. PLEASE obtain records from [facility] and resume these meds if indicated.  - EKG NSR, nad, twi inf leads, slight st dep V4-V6   - Last TTE 8/2022: Normal global left ventricular systolic function.  - Obtain TTE  - c/w Lasix 40mg   - f/u offical chest x-ray read; There is a right middle lung zone opacity and moderate sized right pleural effusion/opacity. Pt states that she has a Hx of right lung scaring but these findings are not present previous chest x-ray on PACS from 2021.  - Pt follows with Cardiologist Dr King    #Questionable Pneumonia  - Not septic on admission; denies CP, SOB, cough  - f/u official chest x-ray read; There is a right middle lung zone opacity and moderate sized right pleural effusion/opacity. Pt states that she has a Hx of right lung scaring but these findings are not present previous chest x-ray on PACS from 2021.  - c/w Vancomycin and Cefepime for now as they are also being used for epimeric OM therapy; add Doxycycline for atypical coverage  - Consider Thoracentesis for Unilateral right sided pleural effusion    #Hyperlipidemia  - on Crestor at home; c/w Atorvastatin    #Misc  - DVT Prophylaxis: Lovenox  - GI Prophylaxis: Not Needed  - Diet: DASH  - Activity: IAT  - Code Status: Full         Ms. 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 Patient endorses onset of bilateral lower extremity swelling status post aortic valve fibroblastoma s/p removal. Pt noticed the development of a right foot blister that presented last week and has since opened and developed surrounding redness. Her physicians at [facility] prescribed topical abx ointment which did not improve her symptoms. Today her visiting nurse noted patient to have a fever of 101 and the pt made the decision to present to the ED.     #Right LE Cellulitis  #r/o Osteomyelitis (unlikely)  - Sepsis not present on admission  - check bcx  - elevate extremity  - c/w Vancomycin and Cefepime for empiric therapy  - f/u R foot X-ray  - ESR/CRP    #Papillary aortic valve fibroblastoma s/p removal on 7/12/23  #Acute CHF  #Mitral regurgitation  #Tricuspid regurgitation  #Pulmonary hypertension  #Hypertension  - BNP 2303; b/l LE +2 pitting edema  - Pt states legs developed edema s/p fibroelastoma removal; She was started on Lasix 20mg by [facility]  - Pt was also started on Amiodarone 200mg qd and Methylprednisolone 4mg qd but is unsure why. PLEASE obtain records from [facility] and resume these meds if indicated.  - EKG NSR, nad, twi inf leads, slight st dep V4-V6   - Last TTE 8/2022: Normal global left ventricular systolic function.  - Obtain TTE  - c/w Lasix 40mg   - f/u offical chest x-ray read; There is a right middle lung zone opacity and moderate sized right pleural effusion/opacity. Pt states that she has a Hx of right lung scaring but these findings are not present previous chest x-ray on PACS from 2021.  - b/l LE venous duplex pending  - Pt follows with Cardiologist Dr King    #Questionable Pneumonia  - Not septic on admission; denies CP, SOB, cough  - f/u official chest x-ray read; There is a right middle lung zone opacity and moderate sized right pleural effusion/opacity. Pt states that she has a Hx of right lung scaring but these findings are not present previous chest x-ray on PACS from 2021.  - c/w Vancomycin and Cefepime for now as they are also being used for epimeric OM therapy; add Doxycycline for atypical coverage  - Consider Thoracentesis for Unilateral right sided pleural effusion    #Hyperlipidemia  - on Crestor at home; c/w Atorvastatin    #Misc  - DVT Prophylaxis: Lovenox  - GI Prophylaxis: Not Needed  - Diet: DASH  - Activity: IAT  - Code Status: Full

## 2023-08-07 NOTE — PHARMACOTHERAPY INTERVENTION NOTE - COMMENTS
As per policy, ordered a vancomycin trough for 8/9 at 4pm in order to assist with vancomycin pharmacokinetic monitoring.    Sena Guevara, PharmD, [facility]  Clinical Pharmacy Specialist, Infectious Diseases  Tele-Antimicrobial Stewardship Program (Tele-ASP)  Tele-ASP Phone: [phone]

## 2023-08-07 NOTE — H&P ADULT - NSHPPHYSICALEXAM_GEN_ALL_CORE
PHYSICAL EXAM:  GENERAL: NAD, lying in bed comfortably  HEAD:  Atraumatic, Normocephalic  EYES: EOMI, PERRLA, conjunctiva and sclera clear  ENT: Moist mucous membranes  NECK: Supple, No JVD  CHEST/LUNG: Decreased BS in Right Lower Lung zone  HEART: Regular rate and rhythm; No murmurs, rubs, or gallops  ABDOMEN: Bowel sounds present; Soft, Nontender, Nondistended. No hepatomegaly  EXTREMITIES:  b/l LE +2 pitting edema, R worse then left. Ulcerative wound to dorsum of R foot with surrounding erythema extending to the level of the ankle  b/l UE lymphedema  NERVOUS SYSTEM:  Alert & Oriented X3, speech clear. No deficits   MSK: FROM all 4 extremities, full and equal strength

## 2023-08-07 NOTE — PHARMACOTHERAPY INTERVENTION NOTE - NSPHARMCOMMASP
ASP - Dose optimization/Non-Renal dose adjustment
ASP - Lab/ test recommended
ASP - Dose optimization/Non-Renal dose adjustment

## 2023-08-07 NOTE — H&P ADULT - NSHPLABSRESULTS_GEN_ALL_CORE
Vitals:  ============  T(F): 98.5 (07 Aug 2023 04:40), Max: 100.2 (06 Aug 2023 18:48)  HR: 73 (07 Aug 2023 04:40)  BP: 127/72 (07 Aug 2023 04:40)  RR: 18 (07 Aug 2023 04:40)  SpO2: 97% (07 Aug 2023 04:40) (97% - 100%)  temp max in last 48H T(F): , Max: 100.2 (08-06-23 @ 18:48)    =======================================================  Current Antibiotics:    Other medications:  atenolol  Tablet 25 milliGRAM(s) Oral daily  atorvastatin 20 milliGRAM(s) Oral at bedtime      =======================================================  Labs:                        11.0   7.54  )-----------( 309      ( 06 Aug 2023 23:46 )             34.6     08-06    138  |  98  |  20  ----------------------------<  125<H>  5.0   |  28  |  0.9    Ca    9.6      06 Aug 2023 23:46    TPro  6.6  /  Alb  3.9  /  TBili  1.1  /  DBili  x   /  AST  28  /  ALT  25  /  AlkPhos  66  08-06      Creatinine: 0.9 mg/dL (08-06-23 @ 23:46)            WBC Count: 7.54 K/uL (08-06-23 @ 23:46)        Alkaline Phosphatase: 66 U/L (08-06-23 @ 23:46)  Alanine Aminotransferase (ALT/SGPT): 25 U/L (08-06-23 @ 23:46)  Aspartate Aminotransferase (AST/SGOT): 28 U/L (08-06-23 @ 23:46)  Bilirubin Total: 1.1 mg/dL (08-06-23 @ 23:46)

## 2023-08-07 NOTE — H&P ADULT - ATTENDING COMMENTS
#R foot ulcer  tracking up to RLE soft tissue; some purulence  cont vanco, ceftriaxone; add flagyl  xray no osteo  wcx  bcx  pod, id eval  bp trending low; hold atenolol; change lasix iv to 20 po    #Progress Note Handoff:  Pending (specify):  Consults_________, Tests________, Test Results_______, Other___f/u pod______  Family discussion: d/w pt at bedside  Disposition: Home___/SNF___/Other________/Unknown at this time_____x___

## 2023-08-07 NOTE — H&P ADULT - HISTORY OF PRESENT ILLNESS
Ms. Reyna is a 78-year-old male with a past medical history of hypertension, hyperlipidemia, s/p aortic valve fibroblastoma removal on 7/12 presents for evaluation of right lower extremity.  Patient endorses bilateral lower extremity swelling status post surgery in development of right foot blister that is since opened with surrounding redness.  Today visiting nurse noted patient to have fever.  Denies headache, neck pain, chest pain, shortness of breath, weakness, numbness, dysuria, hematuria, vomiting, diarrhea.    Vital Signs Last 12 Hrs  T(F): 100.2 (08-06-23 @ 18:48), Max: 100.2 (08-06-23 @ 18:48)  HR: 83 (08-06-23 @ 18:48) (83 - 83)  BP: 143/67 (08-06-23 @ 18:48) (143/67 - 143/67)  RR: 18 (08-06-23 @ 18:48) (18 - 18)  SpO2: 100% (08-06-23 @ 18:48) (100% - 100%)    Labs in the ED are significant for BNP 2302 and Hgb 11.0 (baseline 11.6). Chest x-ray was obtained in the ED and it shows a Right Middle Lung zone opacity with a moderately sized right pleural effusion/opacity. A b/l LE venous duplex was ordered to r/o DVT and Right foot x-ray was ordered to r/o osteomyelitis. Ms. 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 Patient endorses onset of bilateral lower extremity swelling status post aortic valve fibroblastoma s/p removal. Pt noticed the development of a right foot blister that presented last week and has since opened and developed surrounding redness. Her physicians at [facility] prescribed topical abx ointment which did not improve her symptoms. Today her visiting nurse noted patient to have a fever of 101 and the pt made the decision to present to the ED. She denies headache, neck pain, chest pain, shortness of breath, cough, Abd pain, dysuria, hematuria, vomiting, diarrhea.    Vital Signs Last 12 Hrs  T(F): 100.2 (08-06-23 @ 18:48), Max: 100.2 (08-06-23 @ 18:48)  HR: 83 (08-06-23 @ 18:48) (83 - 83)  BP: 143/67 (08-06-23 @ 18:48) (143/67 - 143/67)  RR: 18 (08-06-23 @ 18:48) (18 - 18)  SpO2: 100% (08-06-23 @ 18:48) (100% - 100%)    Labs in the ED are significant for BNP 2302 and Hgb 11.0 (baseline 11.6). Chest x-ray was obtained in the ED pending read. A b/l LE venous duplex was ordered to r/o DVT and Right foot x-ray was ordered to r/o osteomyelitis. Ms. 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 Patient endorses onset of bilateral lower extremity swelling status post aortic valve fibroblastoma s/p removal. Pt noticed the development of a right foot blister that presented last week and has since opened and developed surrounding redness. Her physicians at [facility] prescribed topical abx ointment which did not improve her symptoms. Today her visiting nurse noted patient to have a fever of 101 and the pt made the decision to present to the ED. She denies headache, neck pain, chest pain, shortness of breath, cough, Abd pain, dysuria, hematuria, vomiting, diarrhea.    Vital Signs Last 12 Hrs  T(F): 100.2 (08-06-23 @ 18:48), Max: 100.2 (08-06-23 @ 18:48)  HR: 83 (08-06-23 @ 18:48) (83 - 83)  BP: 143/67 (08-06-23 @ 18:48) (143/67 - 143/67)  RR: 18 (08-06-23 @ 18:48) (18 - 18)  SpO2: 100% (08-06-23 @ 18:48) (100% - 100%)    Labs in the ED are significant for BNP 2302 and Hgb 11.0 (baseline 11.6). Chest x-ray was obtained in the ED pending read. A b/l LE venous duplex was ordered to r/o DVT and Right foot x-ray was ordered to r/o osteomyelitis.

## 2023-08-07 NOTE — PHARMACOTHERAPY INTERVENTION NOTE - COMMENTS
Recommended to adjust vancomycin dose from 1000 mg IV q12h to 1000 mg IV q24h. As per [employer] calculations, the vancomycin steady state AUC/YARA is predicted to be 920.24 mg/L*h on the former dose, which is greater than the therapeutic range of 400 - 600 mg/L*h. Decreasing the dose is predicted to yield an AUC/YARA of 460.31 mg/L*h.    Sena Guevara, PharmD, BCIDP  Clinical Pharmacy Specialist, Infectious Diseases  Tele-Antimicrobial Stewardship Program (Tele-ASP)  Tele-ASP Phone: [phone]

## 2023-08-07 NOTE — CONSULT NOTE ADULT - SUBJECTIVE AND OBJECTIVE BOX
Podiatry Consult Note    Subjective:  KELSY ROYAL  Seen Bedside 78y Female  .   Patient is a 78y old  Female who presents with a chief complaint of Sepsis (07 Aug 2023 03:59)    HPI:  Ms. 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 Patient endorses onset of bilateral lower extremity swelling status post aortic valve fibroblastoma s/p removal. Pt noticed the development of a right foot blister that presented last week and has since opened and developed surrounding redness. Her physicians at [facility] prescribed topical abx ointment which did not improve her symptoms. Today her visiting nurse noted patient to have a fever of 101 and the pt made the decision to present to the ED. She denies headache, neck pain, chest pain, shortness of breath, cough, Abd pain, dysuria, hematuria, vomiting, diarrhea.    Vital Signs Last 12 Hrs  T(F): 100.2 (08-06-23 @ 18:48), Max: 100.2 (08-06-23 @ 18:48)  HR: 83 (08-06-23 @ 18:48) (83 - 83)  BP: 143/67 (08-06-23 @ 18:48) (143/67 - 143/67)  RR: 18 (08-06-23 @ 18:48) (18 - 18)  SpO2: 100% (08-06-23 @ 18:48) (100% - 100%)    Labs in the ED are significant for BNP 2302 and Hgb 11.0 (baseline 11.6). Chest x-ray was obtained in the ED pending read. A b/l LE venous duplex was ordered to r/o DVT and Right foot x-ray was ordered to r/o osteomyelitis. (07 Aug 2023 03:59)      Past Medical History and Surgical History  PAST MEDICAL & SURGICAL HISTORY:  HTN (hypertension)      Hayfever      Breast cancer      Nonrheumatic mitral valve insufficiency      Papillary fibroelastoma of heart      H/O total hip arthroplasty, left      H/O bilateral mastectomy  BILAT LYMPHEDEMA      History of total hip replacement, bilateral           Review of Systems:  [X] Ten point review of systems is otherwise negative except as noted     Objective:  Vital Signs Last 24 Hrs  T(C): 36.8 (07 Aug 2023 07:38), Max: 37.9 (06 Aug 2023 18:48)  T(F): 98.2 (07 Aug 2023 07:38), Max: 100.2 (06 Aug 2023 18:48)  HR: 71 (07 Aug 2023 07:38) (71 - 83)  BP: 91/53 (07 Aug 2023 07:38) (91/53 - 143/67)  BP(mean): --  RR: 18 (07 Aug 2023 07:38) (18 - 18)  SpO2: 96% (07 Aug 2023 07:38) (96% - 100%)                            11.0   7.54  )-----------( 309      ( 06 Aug 2023 23:46 )             34.6                 08-06    138  |  98  |  20  ----------------------------<  125<H>  5.0   |  28  |  0.9    Ca    9.6      06 Aug 2023 23:46    TPro  6.6  /  Alb  3.9  /  TBili  1.1  /  DBili  x   /  AST  28  /  ALT  25  /  AlkPhos  66  08-06    Radiology:  XR_RightFoot 08/06/23  No acute displaced fracture is demonstrated.  No radiographic evidence to suggest osteomyelitis.  Diffuse soft tissue swelling.  Small spur arising from the posterior aspect of the calcaneus.  Thickening of the Achilles tendon.  Shortening of the fourth metatarsal.  Mild hallux valgus deformity of the first toe.  Diffuse osteopenia.    IMPRESSION:  Diffuse soft tissue swelling.    Physical Exam - Lower Extremity Focused:   Derm:   Cellulitic right lower extremity from foot up to anterior leg.  Unilateral edema. Warm to palpation > contralateral limb  Partial thickness wound to dorsum of right foot; periwound erythema. Serous drainage. No fluctuance. No malodors. No streaking.     Vascular: DP and PT Pulses palpable; Foot is Warm to Warm to the touch; Capillary Refill Time < 3 Seconds;    Neuro: Protective Sensation intact  MSK: Moderate Pain On Palpation at Wound Site     Assessment:  Cellulitic Right Lower Extremity     Plan:  Chart reviewed and Patient evaluated. All Questions and Concerns Addressed and Answered  XR Imaging R Foot; results reviewed; soft tissue edema  Weight Bearing Status; WBAT   Recommend; ESR/CRP;   Keep on IV abx for cellulitic right lower extremity;   No surgical intervention indicated at this time;  Discussed Plan w/ Dr. Lott     Podiatry

## 2023-08-08 ENCOUNTER — APPOINTMENT (OUTPATIENT)
Dept: CARDIOLOGY | Facility: CLINIC | Age: 78
End: 2023-08-08

## 2023-08-08 LAB
ALBUMIN SERPL ELPH-MCNC: 3.6 G/DL — SIGNIFICANT CHANGE UP (ref 3.5–5.2)
ALP SERPL-CCNC: 62 U/L — SIGNIFICANT CHANGE UP (ref 30–115)
ALT FLD-CCNC: 16 U/L — SIGNIFICANT CHANGE UP (ref 0–41)
ANION GAP SERPL CALC-SCNC: 11 MMOL/L — SIGNIFICANT CHANGE UP (ref 7–14)
AST SERPL-CCNC: 13 U/L — SIGNIFICANT CHANGE UP (ref 0–41)
BASOPHILS # BLD AUTO: 0.05 K/UL — SIGNIFICANT CHANGE UP (ref 0–0.2)
BASOPHILS NFR BLD AUTO: 0.6 % — SIGNIFICANT CHANGE UP (ref 0–1)
BILIRUB SERPL-MCNC: 0.4 MG/DL — SIGNIFICANT CHANGE UP (ref 0.2–1.2)
BUN SERPL-MCNC: 26 MG/DL — HIGH (ref 10–20)
CALCIUM SERPL-MCNC: 9.4 MG/DL — SIGNIFICANT CHANGE UP (ref 8.4–10.5)
CHLORIDE SERPL-SCNC: 99 MMOL/L — SIGNIFICANT CHANGE UP (ref 98–110)
CO2 SERPL-SCNC: 30 MMOL/L — SIGNIFICANT CHANGE UP (ref 17–32)
CREAT SERPL-MCNC: 0.9 MG/DL — SIGNIFICANT CHANGE UP (ref 0.7–1.5)
CRP SERPL-MCNC: 77.3 MG/L — HIGH
EGFR: 65 ML/MIN/1.73M2 — SIGNIFICANT CHANGE UP
EOSINOPHIL # BLD AUTO: 0.62 K/UL — SIGNIFICANT CHANGE UP (ref 0–0.7)
EOSINOPHIL NFR BLD AUTO: 6.8 % — SIGNIFICANT CHANGE UP (ref 0–8)
ERYTHROCYTE [SEDIMENTATION RATE] IN BLOOD: 70 MM/HR — HIGH (ref 0–20)
GLUCOSE SERPL-MCNC: 100 MG/DL — HIGH (ref 70–99)
HCT VFR BLD CALC: 34.8 % — LOW (ref 37–47)
HGB BLD-MCNC: 11.4 G/DL — LOW (ref 12–16)
IMM GRANULOCYTES NFR BLD AUTO: 0.2 % — SIGNIFICANT CHANGE UP (ref 0.1–0.3)
LYMPHOCYTES # BLD AUTO: 0.72 K/UL — LOW (ref 1.2–3.4)
LYMPHOCYTES # BLD AUTO: 7.9 % — LOW (ref 20.5–51.1)
MAGNESIUM SERPL-MCNC: 1.9 MG/DL — SIGNIFICANT CHANGE UP (ref 1.8–2.4)
MCHC RBC-ENTMCNC: 31.3 PG — HIGH (ref 27–31)
MCHC RBC-ENTMCNC: 32.8 G/DL — SIGNIFICANT CHANGE UP (ref 32–37)
MCV RBC AUTO: 95.6 FL — SIGNIFICANT CHANGE UP (ref 81–99)
MONOCYTES # BLD AUTO: 1.21 K/UL — HIGH (ref 0.1–0.6)
MONOCYTES NFR BLD AUTO: 13.3 % — HIGH (ref 1.7–9.3)
NEUTROPHILS # BLD AUTO: 6.45 K/UL — SIGNIFICANT CHANGE UP (ref 1.4–6.5)
NEUTROPHILS NFR BLD AUTO: 71.2 % — SIGNIFICANT CHANGE UP (ref 42.2–75.2)
NRBC # BLD: 0 /100 WBCS — SIGNIFICANT CHANGE UP (ref 0–0)
PLATELET # BLD AUTO: 324 K/UL — SIGNIFICANT CHANGE UP (ref 130–400)
PMV BLD: 9.2 FL — SIGNIFICANT CHANGE UP (ref 7.4–10.4)
POTASSIUM SERPL-MCNC: 3.9 MMOL/L — SIGNIFICANT CHANGE UP (ref 3.5–5)
POTASSIUM SERPL-SCNC: 3.9 MMOL/L — SIGNIFICANT CHANGE UP (ref 3.5–5)
PROT SERPL-MCNC: 6.3 G/DL — SIGNIFICANT CHANGE UP (ref 6–8)
RBC # BLD: 3.64 M/UL — LOW (ref 4.2–5.4)
RBC # FLD: 14.3 % — SIGNIFICANT CHANGE UP (ref 11.5–14.5)
SODIUM SERPL-SCNC: 140 MMOL/L — SIGNIFICANT CHANGE UP (ref 135–146)
WBC # BLD: 9.07 K/UL — SIGNIFICANT CHANGE UP (ref 4.8–10.8)
WBC # FLD AUTO: 9.07 K/UL — SIGNIFICANT CHANGE UP (ref 4.8–10.8)

## 2023-08-08 PROCEDURE — 93010 ELECTROCARDIOGRAM REPORT: CPT

## 2023-08-08 PROCEDURE — 99232 SBSQ HOSP IP/OBS MODERATE 35: CPT

## 2023-08-08 RX ORDER — VANCOMYCIN HCL 1 G
1250 VIAL (EA) INTRAVENOUS EVERY 24 HOURS
Refills: 0 | Status: DISCONTINUED | OUTPATIENT
Start: 2023-08-09 | End: 2023-08-10

## 2023-08-08 RX ORDER — VANCOMYCIN HCL 1 G
1000 VIAL (EA) INTRAVENOUS EVERY 12 HOURS
Refills: 0 | Status: DISCONTINUED | OUTPATIENT
Start: 2023-08-08 | End: 2023-08-08

## 2023-08-08 RX ORDER — MAGNESIUM SULFATE 500 MG/ML
1 VIAL (ML) INJECTION ONCE
Refills: 0 | Status: COMPLETED | OUTPATIENT
Start: 2023-08-08 | End: 2023-08-08

## 2023-08-08 RX ORDER — AMPICILLIN SODIUM AND SULBACTAM SODIUM 250; 125 MG/ML; MG/ML
3 INJECTION, POWDER, FOR SUSPENSION INTRAMUSCULAR; INTRAVENOUS EVERY 6 HOURS
Refills: 0 | Status: DISCONTINUED | OUTPATIENT
Start: 2023-08-08 | End: 2023-08-10

## 2023-08-08 RX ADMIN — ENOXAPARIN SODIUM 40 MILLIGRAM(S): 100 INJECTION SUBCUTANEOUS at 05:13

## 2023-08-08 RX ADMIN — CEFTRIAXONE 100 MILLIGRAM(S): 500 INJECTION, POWDER, FOR SOLUTION INTRAMUSCULAR; INTRAVENOUS at 09:34

## 2023-08-08 RX ADMIN — AMPICILLIN SODIUM AND SULBACTAM SODIUM 200 GRAM(S): 250; 125 INJECTION, POWDER, FOR SUSPENSION INTRAMUSCULAR; INTRAVENOUS at 20:50

## 2023-08-08 RX ADMIN — AMPICILLIN SODIUM AND SULBACTAM SODIUM 200 GRAM(S): 250; 125 INJECTION, POWDER, FOR SUSPENSION INTRAMUSCULAR; INTRAVENOUS at 13:03

## 2023-08-08 RX ADMIN — Medication 100 GRAM(S): at 21:36

## 2023-08-08 RX ADMIN — Medication 250 MILLIGRAM(S): at 10:39

## 2023-08-08 RX ADMIN — ATORVASTATIN CALCIUM 20 MILLIGRAM(S): 80 TABLET, FILM COATED ORAL at 21:36

## 2023-08-08 RX ADMIN — Medication 20 MILLIGRAM(S): at 05:13

## 2023-08-08 RX ADMIN — Medication 100 MILLIGRAM(S): at 05:13

## 2023-08-08 NOTE — PROGRESS NOTE ADULT - SUBJECTIVE AND OBJECTIVE BOX
24H events:    Patient is a 78y old Female who presents with a chief complaint of Sepsis (07 Aug 2023 14:08)    Primary diagnosis of Cellulitis      Day 0: ID consulted, blood cultures were drawn, Echo showed EF 65-70%, G2 diastol dysfxn. Podiatry did not rec surg intervention.      Today is 1d of hospitalization. This morning patient was seen and examined at bedside, resting comfortably in bed.    No acute or major events overnight on telemetry.  Pt reports intermittent, mild chest tightness that is currently not present as well as minimal-to-no R foot pain at the site of wound.     Code Status: full code      PAST MEDICAL & SURGICAL HISTORY  HTN (hypertension)    Hayfever    Breast cancer    Nonrheumatic mitral valve insufficiency    Papillary fibroelastoma of heart    H/O total hip arthroplasty, left    H/O bilateral mastectomy  BILAT LYMPHEDEMA    History of total hip replacement, bilateral      SOCIAL HISTORY:  Social History:  no alcohol, smoking hx (07 Aug 2023 03:59)      ALLERGIES:  No Known Allergies    MEDICATIONS:  STANDING MEDICATIONS  atorvastatin 20 milliGRAM(s) Oral at bedtime  cefTRIAXone   IVPB 1000 milliGRAM(s) IV Intermittent every 24 hours  enoxaparin Injectable 40 milliGRAM(s) SubCutaneous every 24 hours  furosemide    Tablet 20 milliGRAM(s) Oral daily  metroNIDAZOLE  IVPB 500 milliGRAM(s) IV Intermittent every 8 hours  vancomycin  IVPB 1000 milliGRAM(s) IV Intermittent every 24 hours    PRN MEDICATIONS    VITALS:   T(F): 98.4  HR: 74  BP: 126/60  RR: 18  SpO2: 97%    PHYSICAL EXAM:  GENERAL:   ( x) NAD, lying in bed comfortably     (  ) obtunded     (  ) lethargic     (  ) somnolent    HEAD:   ( x) Atraumatic     (  ) hematoma     (  ) laceration (specify location:       )     NECK:  (x) Supple     (  ) neck stiffness     (  ) nuchal rigidity     (  )  no JVD     (  ) JVD present ( -- cm)    HEART:  Rate -->     (x) normal rate     (  ) bradycardic     (  ) tachycardic  Rhythm -->     (x) regular     (  ) regularly irregular     (  ) irregularly irregular  Murmurs -->     (x) normal s1s2     (  ) systolic murmur     (  ) diastolic murmur     (  ) continuous murmur      (  ) S3 present     (  ) S4 present    LUNGS:   ( x)Unlabored respirations     (  ) tachypnea  ( x) B/L air entry     (  ) decreased breath sounds in:  (location     )    ( x) no adventitious sound     (  ) crackles     (  ) wheezing      (  ) rhonchi      (specify location:       )  (  ) chest wall tenderness (specify location:       )    ABDOMEN:   ( x) Soft     (  ) tense   |   (  ) nondistended     (  ) distended   |   (  ) +BS     (  ) hypoactive bowel sounds     (  ) hyperactive bowel sounds  ( x) nontender     (  ) RUQ tenderness     (  ) RLQ tenderness     (  ) LLQ tenderness     (  ) epigastric tenderness     (  ) diffuse tenderness  (  ) Splenomegaly      (  ) Hepatomegaly      (  ) Jaundice     (  ) ecchymosis     EXTREMITIES:  ( ) Normal   (x) RLE erythema from foot to shin (  ) ecchymosis     (x) varicose veins      (  ) pitting edema     (x) RLE non-pitting edema   (x) dorsal foot ulceration     (  ) gangrene:       NERVOUS SYSTEM:    ( x) A&Ox3     (  ) confused     (  ) lethargic  CN II-XII:     ( x) Intact     (  ) deficits found     (Specify:     )   Upper extremities:     (  ) no sensorimotor deficits     (  ) weakness     (  ) loss of proprioception/vibration     (  ) loss of touch/temperature (specify:    )  Lower extremities:     (  ) no sensorimotor deficits     (  ) weakness     (  ) loss of proprioception/vibration     (  ) loss of touch/temperature (specify:    )    SKIN:   (  ) No rashes or lesions     (  ) maculopapular rash     (  ) pustules     (  ) vesicles     (  ) ulcer     (  ) ecchymosis     (specify location:     )    AMPAC score :    (  ) Indwelling Liang Catheter:   Date insterted:    Reason (  ) Critical illness     (  ) urinary retention    (  ) Accurate Ins/Outs Monitoring     (  ) CMO patient    (  ) Central Line:   Date inserted:  Location: (  ) Right IJ     (  ) Left IJ     (  ) Right Fem     (  ) Left Fem    (  ) SPC        (  ) pigtail       (  ) PEG tube       (  ) colostomy       (  ) jejunostomy  (  ) U-Dall    LABS:                        11.0   7.54  )-----------( 309      ( 06 Aug 2023 23:46 )             34.6     08-06    138  |  98  |  20  ----------------------------<  125<H>  5.0   |  28  |  0.9    Ca    9.6      06 Aug 2023 23:46    TPro  6.6  /  Alb  3.9  /  TBili  1.1  /  DBili  x   /  AST  28  /  ALT  25  /  AlkPhos  66  08-06      Urinalysis Basic - ( 06 Aug 2023 23:46 )    Color: x / Appearance: x / SG: x / pH: x  Gluc: 125 mg/dL / Ketone: x  / Bili: x / Urobili: x   Blood: x / Protein: x / Nitrite: x   Leuk Esterase: x / RBC: x / WBC x   Sq Epi: x / Non Sq Epi: x / Bacteria: x            CARDIAC MARKERS ( 06 Aug 2023 23:46 )  x     / <0.01 ng/mL / x     / x     / x          RADIOLOGY:    < from: [facility] Duplex Lower Ext Vein Scan, Bilat (08.06.23 @ 21:25) >  IMPRESSION:  No evidence of deep venous thrombosis in either lower extremity.    < end of copied text >  < from: Xray Foot AP + Lateral + Oblique, Right (08.06.23 @ 20:48) >  IMPRESSION:    Diffuse soft tissue swelling.    < end of copied text >  < from: Xray Foot AP + Lateral + Oblique, Right (08.06.23 @ 20:48) >  No radiographic evidence to suggest osteomyelitis.    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 Patient endorses onset of bilateral lower extremity swelling status post aortic valve fibroblastoma s/p removal. Pt noticed the development of a right foot blister that presented last week and has since opened and developed surrounding redness. Her physicians at [facility] prescribed topical abx ointment which did not improve her symptoms. Today her visiting nurse noted patient to have a fever of 101 and the pt made the decision to present to the ED.     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 PLEASE obtain records from [facility] and resume these meds if indicated.  - EKG NSR, nad, twi inf leads, slight st dep V4-V6   - Last TTE 8/2022: Normal global left ventricular systolic function.  - 8/2023: Echo showed EF 65-70%, G2 diastol dysfxn.   - c/w lasix 20mg PO (home dose)  - b/l LE venous duplex   - Pt follows with Cardiologist Dr King      #Hyperlipidemia  - on Crestor at home; c/w Atorvastatin    #Misc  - DVT Prophylaxis: Lovenox  - GI Prophylaxis: Not Needed  - Diet: DASH  - Activity: IAT  - Code Status: Full      ---------------------------------------------------------------------------------------------------------------------------------  #Handoff  - f/u ID recs, f/u final cultures, f/u esr/crp           24H events:    Patient is a 78y old Female who presents with a chief complaint of Sepsis (07 Aug 2023 14:08)    Primary diagnosis of Cellulitis      Day 0: ID consulted, blood cultures were drawn, Echo showed EF 65-70%, G2 diastol dysfxn. Podiatry did not rec surg intervention.      Today is 1d of hospitalization. This morning patient was seen and examined at bedside, resting comfortably in bed.    No acute or major events overnight on telemetry.  Pt reports intermittent, mild chest tightness that is currently not present as well as minimal-to-no R foot pain at the site of wound.     Code Status: full code      PAST MEDICAL & SURGICAL HISTORY  HTN (hypertension)    Hayfever    Breast cancer    Nonrheumatic mitral valve insufficiency    Papillary fibroelastoma of heart    H/O total hip arthroplasty, left    H/O bilateral mastectomy  BILAT LYMPHEDEMA    History of total hip replacement, bilateral      SOCIAL HISTORY:  Social History:  no alcohol, smoking hx (07 Aug 2023 03:59)      ALLERGIES:  No Known Allergies    MEDICATIONS:  STANDING MEDICATIONS  atorvastatin 20 milliGRAM(s) Oral at bedtime  cefTRIAXone   IVPB 1000 milliGRAM(s) IV Intermittent every 24 hours  enoxaparin Injectable 40 milliGRAM(s) SubCutaneous every 24 hours  furosemide    Tablet 20 milliGRAM(s) Oral daily  metroNIDAZOLE  IVPB 500 milliGRAM(s) IV Intermittent every 8 hours  vancomycin  IVPB 1000 milliGRAM(s) IV Intermittent every 24 hours    PRN MEDICATIONS    VITALS:   T(F): 98.4  HR: 74  BP: 126/60  RR: 18  SpO2: 97%    PHYSICAL EXAM:  GENERAL:   ( x) NAD, lying in bed comfortably     (  ) obtunded     (  ) lethargic     (  ) somnolent    HEAD:   ( x) Atraumatic     (  ) hematoma     (  ) laceration (specify location:       )     NECK:  (x) Supple     (  ) neck stiffness     (  ) nuchal rigidity     (  )  no JVD     (  ) JVD present ( -- cm)    HEART:  Rate -->     (x) normal rate     (  ) bradycardic     (  ) tachycardic  Rhythm -->     (x) regular     (  ) regularly irregular     (  ) irregularly irregular  Murmurs -->     (x) normal s1s2     (  ) systolic murmur     (  ) diastolic murmur     (  ) continuous murmur      (  ) S3 present     (  ) S4 present    LUNGS:   ( x)Unlabored respirations     (  ) tachypnea  ( x) B/L air entry     (  ) decreased breath sounds in:  (location     )    ( x) no adventitious sound     (  ) crackles     (  ) wheezing      (  ) rhonchi      (specify location:       )  (  ) chest wall tenderness (specify location:       )    ABDOMEN:   ( x) Soft     (  ) tense   |   (  ) nondistended     (  ) distended   |   (  ) +BS     (  ) hypoactive bowel sounds     (  ) hyperactive bowel sounds  ( x) nontender     (  ) RUQ tenderness     (  ) RLQ tenderness     (  ) LLQ tenderness     (  ) epigastric tenderness     (  ) diffuse tenderness  (  ) Splenomegaly      (  ) Hepatomegaly      (  ) Jaundice     (  ) ecchymosis     EXTREMITIES:  ( ) Normal   (x) RLE erythema from foot to shin (  ) ecchymosis     (x) varicose veins      (  ) pitting edema     (x) RLE non-pitting edema   (x) dorsal foot ulceration     (  ) gangrene:       NERVOUS SYSTEM:    ( x) A&Ox3     (  ) confused     (  ) lethargic  CN II-XII:     ( x) Intact     (  ) deficits found     (Specify:     )   Upper extremities:     (  ) no sensorimotor deficits     (  ) weakness     (  ) loss of proprioception/vibration     (  ) loss of touch/temperature (specify:    )  Lower extremities:     (  ) no sensorimotor deficits     (  ) weakness     (  ) loss of proprioception/vibration     (  ) loss of touch/temperature (specify:    )    SKIN:   (  ) No rashes or lesions     (  ) maculopapular rash     (  ) pustules     (  ) vesicles     (  ) ulcer     (  ) ecchymosis     (specify location:     )    AMPAC score :    (  ) Indwelling Liang Catheter:   Date insterted:    Reason (  ) Critical illness     (  ) urinary retention    (  ) Accurate Ins/Outs Monitoring     (  ) CMO patient    (  ) Central Line:   Date inserted:  Location: (  ) Right IJ     (  ) Left IJ     (  ) Right Fem     (  ) Left Fem    (  ) SPC        (  ) pigtail       (  ) PEG tube       (  ) colostomy       (  ) jejunostomy  (  ) U-Dall    LABS:                        11.0   7.54  )-----------( 309      ( 06 Aug 2023 23:46 )             34.6     08-06    138  |  98  |  20  ----------------------------<  125<H>  5.0   |  28  |  0.9    Ca    9.6      06 Aug 2023 23:46    TPro  6.6  /  Alb  3.9  /  TBili  1.1  /  DBili  x   /  AST  28  /  ALT  25  /  AlkPhos  66  08-06      Urinalysis Basic - ( 06 Aug 2023 23:46 )    Color: x / Appearance: x / SG: x / pH: x  Gluc: 125 mg/dL / Ketone: x  / Bili: x / Urobili: x   Blood: x / Protein: x / Nitrite: x   Leuk Esterase: x / RBC: x / WBC x   Sq Epi: x / Non Sq Epi: x / Bacteria: x            CARDIAC MARKERS ( 06 Aug 2023 23:46 )  x     / <0.01 ng/mL / x     / x     / x          RADIOLOGY:    < from: [facility] Duplex Lower Ext Vein Scan, Bilat (08.06.23 @ 21:25) >  IMPRESSION:  No evidence of deep venous thrombosis in either lower extremity.    < end of copied text >  < from: Xray Foot AP + Lateral + Oblique, Right (08.06.23 @ 20:48) >  IMPRESSION:    Diffuse soft tissue swelling.    < end of copied text >  < from: Xray Foot AP + Lateral + Oblique, Right (08.06.23 @ 20:48) >  No radiographic evidence to suggest osteomyelitis.    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 Patient endorses onset of bilateral lower extremity swelling status post aortic valve fibroblastoma s/p removal. Pt noticed the development of a right foot blister that presented last week and has since opened and developed surrounding redness. Her physicians at [facility] prescribed topical abx ointment which did not improve her symptoms. Today her visiting nurse noted patient to have a fever of 101 and the pt made the decision to present to the ED.     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 PLEASE obtain records from [facility] and resume these meds if indicated.  - EKG NSR, nad, twi inf leads, slight st dep V4-V6   - Last TTE 8/2022: Normal global left ventricular systolic function.  - 8/2023: Echo showed EF 65-70%, G2 diastol dysfxn.   - c/w lasix 20mg PO (home dose)  - b/l LE venous duplex   - Pt follows with Cardiologist Dr King      #Hyperlipidemia  - on Crestor at home; c/w Atorvastatin    #Misc  - DVT Prophylaxis: Lovenox  - GI Prophylaxis: Not Needed  - Diet: DASH  - Activity: IAT  - Code Status: Full      ---------------------------------------------------------------------------------------------------------------------------------  #Handoff  - f/u pod recs, f/u final cultures, f/u esr/crp

## 2023-08-08 NOTE — PROGRESS NOTE ADULT - SUBJECTIVE AND OBJECTIVE BOX
KELSY ROYAL  78y, Female  Allergy: No Known Allergies      All historical available data reviewed.    HPI:  Ms. 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 Patient endorses onset of bilateral lower extremity swelling status post aortic valve fibroblastoma s/p removal. Pt noticed the development of a right foot blister that presented last week and has since opened and developed surrounding redness. Her physicians at [facility] prescribed topical abx ointment which did not improve her symptoms. Today her visiting nurse noted patient to have a fever of 101 and the pt made the decision to present to the ED. She denies headache, neck pain, chest pain, shortness of breath, cough, Abd pain, dysuria, hematuria, vomiting, diarrhea.    Vital Signs Last 12 Hrs  T(F): 100.2 (08-06-23 @ 18:48), Max: 100.2 (08-06-23 @ 18:48)  HR: 83 (08-06-23 @ 18:48) (83 - 83)  BP: 143/67 (08-06-23 @ 18:48) (143/67 - 143/67)  RR: 18 (08-06-23 @ 18:48) (18 - 18)  SpO2: 100% (08-06-23 @ 18:48) (100% - 100%)    Labs in the ED are significant for BNP 2302 and Hgb 11.0 (baseline 11.6). Chest x-ray was obtained in the ED pending read. A b/l LE venous duplex was ordered to r/o DVT and Right foot x-ray was ordered to r/o osteomyelitis. (07 Aug 2023 03:59)    FAMILY HISTORY:  No pertinent family history in first degree relatives      PAST MEDICAL & SURGICAL HISTORY:  HTN (hypertension)      Hayfever      Breast cancer      Nonrheumatic mitral valve insufficiency      Papillary fibroelastoma of heart      H/O total hip arthroplasty, left      H/O bilateral mastectomy  BILAT LYMPHEDEMA      History of total hip replacement, bilateral            VITALS:  T(F): 98.4, Max: 98.4 (08-08-23 @ 04:52)  HR: 74  BP: 126/60  RR: 18Vital Signs Last 24 Hrs  T(C): 36.9 (08 Aug 2023 04:52), Max: 36.9 (08 Aug 2023 04:52)  T(F): 98.4 (08 Aug 2023 04:52), Max: 98.4 (08 Aug 2023 04:52)  HR: 74 (08 Aug 2023 04:52) (72 - 74)  BP: 126/60 (08 Aug 2023 04:52) (107/55 - 136/63)  BP(mean): 69 (07 Aug 2023 23:42) (69 - 69)  RR: 18 (08 Aug 2023 04:52) (18 - 18)  SpO2: 97% (08 Aug 2023 01:45) (96% - 97%)    Parameters below as of 08 Aug 2023 01:45  Patient On (Oxygen Delivery Method): room air        TESTS & MEASUREMENTS:                        11.0   7.54  )-----------( 309      ( 06 Aug 2023 23:46 )             34.6     08-06    138  |  98  |  20  ----------------------------<  125<H>  5.0   |  28  |  0.9    Ca    9.6      06 Aug 2023 23:46    TPro  6.6  /  Alb  3.9  /  TBili  1.1  /  DBili  x   /  AST  28  /  ALT  25  /  AlkPhos  66  08-06    LIVER FUNCTIONS - ( 06 Aug 2023 23:46 )  Alb: 3.9 g/dL / Pro: 6.6 g/dL / ALK PHOS: 66 U/L / ALT: 25 U/L / AST: 28 U/L / GGT: x             Culture - Blood (collected 08-07-23 @ 00:40)  Source: .Blood Blood  Preliminary Report (08-08-23 @ 09:02):    No growth at 24 hours    Culture - Blood (collected 08-07-23 @ 00:40)  Source: .Blood Blood  Preliminary Report (08-08-23 @ 09:02):    No growth at 24 hours      Urinalysis Basic - ( 06 Aug 2023 23:46 )    Color: x / Appearance: x / SG: x / pH: x  Gluc: 125 mg/dL / Ketone: x  / Bili: x / Urobili: x   Blood: x / Protein: x / Nitrite: x   Leuk Esterase: x / RBC: x / WBC x   Sq Epi: x / Non Sq Epi: x / Bacteria: x          RADIOLOGY & ADDITIONAL TESTS:  Personal review of radiological diagnostics performed  Echo and EKG results noted when applicable.     MEDICATIONS:  atorvastatin 20 milliGRAM(s) Oral at bedtime  cefTRIAXone   IVPB 1000 milliGRAM(s) IV Intermittent every 24 hours  enoxaparin Injectable 40 milliGRAM(s) SubCutaneous every 24 hours  furosemide    Tablet 20 milliGRAM(s) Oral daily  metroNIDAZOLE  IVPB 500 milliGRAM(s) IV Intermittent every 8 hours  vancomycin  IVPB 1000 milliGRAM(s) IV Intermittent every 24 hours      ANTIBIOTICS:  cefTRIAXone   IVPB 1000 milliGRAM(s) IV Intermittent every 24 hours  metroNIDAZOLE  IVPB 500 milliGRAM(s) IV Intermittent every 8 hours  vancomycin  IVPB 1000 milliGRAM(s) IV Intermittent every 24 hours

## 2023-08-08 NOTE — PHARMACOTHERAPY INTERVENTION NOTE - COMMENTS
Patient on vancomycin 1000 mg IV q12h for subcutaneous abscess with surrounding cellulitis. Per [employer] Bayesian vancomycin dosing software, current regimen of vancomycin 1000mg IV q12h predicts a supratherapeutic steady-state AUC/YARA of 920.24 mg/L*hr (goal 400-600). A reduced regimen of 1250 mg IV q24h predicts a therapeutic steady-state AUC/YARA of 575.38 mg/L*hr. Recommended decreasing vancomycin dose to 1250mg IV q24h starting 8/9 at 1000. Recommended obtaining vancomycin level on 8/10 with AM labs, prior to fourth total dose of vancomycin.

## 2023-08-08 NOTE — PROGRESS NOTE ADULT - ATTENDING COMMENTS
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    GENERAL: NAD, lying in bed comfortably  CHEST/LUNG: Clear to auscultation bilaterally; No rales, rhonchi, wheezing, or rubs. Unlabored respirations  HEART: Regular rate and rhythm; No murmurs, rubs, or gallops  ABDOMEN: Bowel sounds present; Soft, Nontender, Nondistended. No hepatomegally  EXTREMITIES:  2+ Peripheral Pulses, brisk capillary refill. No clubbing, cyanosis, or edema  NERVOUS SYSTEM:  Alert & Oriented X3, speech clear. No deficits   MSK: FROM all 4 extremities, full and equal strength  SKIN: right foot erythema with abcess formation mid ventral foot.     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  - 8/2023: Echo showed EF 65-70%, G2 diastol dysfxn.   - c/w lasix 20mg PO (home dose)  - LE venous duplex neg  - Pt follows with Cardiologist Dr King    #Hyperlipidemia  c/w Atorvastatin    #Misc  - DVT Prophylaxis: Lovenox  - GI Prophylaxis: Not Needed  - Diet: DASH  - Activity: IAT  - Code Status: Full    #Progress Note Handoff  Pending (specify): Pod debridement. IV abx . PT consult   Family discussion: spoke to patient at bedside   Disposition: Unknown at this time

## 2023-08-08 NOTE — PHARMACOTHERAPY INTERVENTION NOTE - COMMENTS
Called patients pharmacy [facility] ([phone]) to verify home medications. Admission med rec correct patient per [facility] she did  a 21 days supply of amiodarone 200 mg po daily on 7/21/23 and and methylprednisolone 4 mg po daily for 10 days. Updated medical team.  Called patients pharmacy [facility] ([phone]) to verify home medications. Admission med rec correct per [facility] she did  a 21 days supply of amiodarone 200 mg po daily on 7/21/23 and and methylprednisolone 4 mg po daily for 10 days. I spoke with the patient and she also confirmed her outpatient medication list. She was unclear of why she was started on amiodarone and states this was done during her last hospital admission at [facility]. Updated medical team.

## 2023-08-09 PROCEDURE — 99233 SBSQ HOSP IP/OBS HIGH 50: CPT

## 2023-08-09 PROCEDURE — 99223 1ST HOSP IP/OBS HIGH 75: CPT

## 2023-08-09 RX ADMIN — AMPICILLIN SODIUM AND SULBACTAM SODIUM 200 GRAM(S): 250; 125 INJECTION, POWDER, FOR SUSPENSION INTRAMUSCULAR; INTRAVENOUS at 02:12

## 2023-08-09 RX ADMIN — AMPICILLIN SODIUM AND SULBACTAM SODIUM 200 GRAM(S): 250; 125 INJECTION, POWDER, FOR SUSPENSION INTRAMUSCULAR; INTRAVENOUS at 22:00

## 2023-08-09 RX ADMIN — ENOXAPARIN SODIUM 40 MILLIGRAM(S): 100 INJECTION SUBCUTANEOUS at 05:49

## 2023-08-09 RX ADMIN — AMPICILLIN SODIUM AND SULBACTAM SODIUM 200 GRAM(S): 250; 125 INJECTION, POWDER, FOR SUSPENSION INTRAMUSCULAR; INTRAVENOUS at 14:51

## 2023-08-09 RX ADMIN — Medication 166.67 MILLIGRAM(S): at 11:24

## 2023-08-09 RX ADMIN — ATORVASTATIN CALCIUM 20 MILLIGRAM(S): 80 TABLET, FILM COATED ORAL at 22:01

## 2023-08-09 RX ADMIN — AMPICILLIN SODIUM AND SULBACTAM SODIUM 200 GRAM(S): 250; 125 INJECTION, POWDER, FOR SUSPENSION INTRAMUSCULAR; INTRAVENOUS at 08:18

## 2023-08-09 RX ADMIN — Medication 20 MILLIGRAM(S): at 05:34

## 2023-08-09 NOTE — PHYSICAL THERAPY INITIAL EVALUATION ADULT - GENERAL OBSERVATIONS, REHAB EVAL
Pt. encountered alert and NAD, supine in bed, (+)tele (+)primafit (+)IV lock, agreeable to PT IE. Pt. left in b/s chair (+)alarms (+)call bell in reach (+)IV lock, (+)tele, NAD

## 2023-08-09 NOTE — PHYSICAL THERAPY INITIAL EVALUATION ADULT - PERTINENT HX OF CURRENT PROBLEM, REHAB EVAL
Ms. 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 Patient endorses onset of bilateral lower extremity swelling status post aortic valve fibroblastoma s/p removal. Pt noticed the development of a right foot blister that presented last week and has since opened and developed surrounding redness. Her physicians at [facility] prescribed topical abx ointment which did not improve her symptoms. Today her visiting nurse noted patient to have a fever of 101 and the pt made the decision to present to the ED. She denies headache, neck pain, chest pain, shortness of breath, cough, Abd pain, dysuria, hematuria, vomiting, diarrhea.

## 2023-08-09 NOTE — PHYSICAL THERAPY INITIAL EVALUATION ADULT - NSACTIVITYREC_GEN_A_PT
Cont with PT including gait training, balance training, transfer training, and stair training in order to return home safely.

## 2023-08-09 NOTE — CONSULT NOTE ADULT - ASSESSMENT
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 at [facility], mitral regurgitation, tricuspid regurgitation, pulmonary hypertension, hypertension, hyperlipidemia, Breast Ca s/p b/l mastectomy causing b/l UE lymphedema who is admitted for RLE cellulitis  Reason for consult: Diffused TWI on ECG    Impression:   # TWI V1-V6 and AVL  # AV fibroblastoma s/p removal on July   # HO MR, TR and pHTN         > Trops neg          > New identifiable TWI inversion in lateral leads         > TTE 08/07: Normal EF, GIIDD, Biatrial enlargement, mod TR, mild AR with normal valve opening     Plan:         Sara Biggs MD   Internal Medicine Resident - PGYIII 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 at [facility], mitral regurgitation, tricuspid regurgitation, pulmonary hypertension, hypertension, hyperlipidemia, Breast Ca s/p b/l mastectomy causing b/l UE lymphedema who is admitted for RLE cellulitis  Reason for consult: Diffused TWI on ECG    Impression:   # TWI V1-V6 and AVL  # AV fibroblastoma s/p removal on July   # HO MR, TR and pHTN         > Trops neg          > New identifiable TWI inversion in lateral leads         > TTE 08/07: Normal EF, GIIDD, Biatrial enlargement, mod TR, mild AR with normal valve opening     Plan:   - No further work is warranted  - Recall PRN   - Outpatient follow up with Dr Christine Biggs MD   Internal Medicine Resident - PGYIII 78-year-old male with a past medical history of 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    Reason for consult: Diffuse TWI on ECG    Impression:   # TWI V1-V6 and AVL  # AV fibroblastoma s/p removal in July 2023 at [facility]  # H/O MR, TR and pHTN         > Trops neg          > New identifiable TWI inversion in lateral leads         > TTE 08/07: Normal EF, GIIDD, Biatrial enlargement, mod TR, mild AR with normal valve opening     Plan:   - No further workup is warranted at this time  - Recall PRN   - Outpatient follow up with Dr. Christine Biggs MD   Internal Medicine Resident - PGYIII

## 2023-08-09 NOTE — PROGRESS NOTE ADULT - SUBJECTIVE AND OBJECTIVE BOX
KELSY ROYAL  78y, Female    All available historical data reviewed    OVERNIGHT EVENTS:  feels well and has no new complaints  No fevers     ROS:  General: Denies rigors, nightsweats  HEENT: Denies headache, rhinorrhea, sore throat, eye pain  CV: Denies CP, palpitations  PULM: Denies wheezing, hemoptysis  GI: Denies hematemesis, hematochezia, melena  : Denies discharge, hematuria  MSK: Denies arthralgias, myalgias  SKIN: Denies rash, lesions  NEURO: Denies paresthesias, weakness  PSYCH: Denies depression, anxiety    VITALS:  T(F): 98.1, Max: 98.8 (08-08-23 @ 14:02)  HR: 80  BP: 122/58  RR: 18Vital Signs Last 24 Hrs  T(C): 36.7 (09 Aug 2023 05:08), Max: 37.1 (08 Aug 2023 14:02)  T(F): 98.1 (09 Aug 2023 05:08), Max: 98.8 (08 Aug 2023 14:02)  HR: 80 (09 Aug 2023 05:08) (77 - 82)  BP: 122/58 (09 Aug 2023 05:08) (106/51 - 130/62)  BP(mean): --  RR: 18 (09 Aug 2023 05:08) (18 - 18)  SpO2: --        TESTS & MEASUREMENTS:                        11.4   9.07  )-----------( 324      ( 08 Aug 2023 17:30 )             34.8     08-08    140  |  99  |  26<H>  ----------------------------<  100<H>  3.9   |  30  |  0.9    Ca    9.4      08 Aug 2023 17:30  Mg     1.9     08-08    TPro  6.3  /  Alb  3.6  /  TBili  0.4  /  DBili  x   /  AST  13  /  ALT  16  /  AlkPhos  62  08-08    LIVER FUNCTIONS - ( 08 Aug 2023 17:30 )  Alb: 3.6 g/dL / Pro: 6.3 g/dL / ALK PHOS: 62 U/L / ALT: 16 U/L / AST: 13 U/L / GGT: x             Culture - Blood (collected 08-07-23 @ 00:40)  Source: .Blood Blood  Preliminary Report (08-09-23 @ 09:01):    No growth at 48 Hours    Culture - Blood (collected 08-07-23 @ 00:40)  Source: .Blood Blood  Preliminary Report (08-09-23 @ 09:01):    No growth at 48 Hours      Urinalysis Basic - ( 08 Aug 2023 17:30 )    Color: x / Appearance: x / SG: x / pH: x  Gluc: 100 mg/dL / Ketone: x  / Bili: x / Urobili: x   Blood: x / Protein: x / Nitrite: x   Leuk Esterase: x / RBC: x / WBC x   Sq Epi: x / Non Sq Epi: x / Bacteria: x          RADIOLOGY & ADDITIONAL TESTS:  Personal review of radiological diagnostics performed  Echo and EKG results noted when applicable.     MEDICATIONS:  ampicillin/sulbactam  IVPB 3 Gram(s) IV Intermittent every 6 hours  atorvastatin 20 milliGRAM(s) Oral at bedtime  enoxaparin Injectable 40 milliGRAM(s) SubCutaneous every 24 hours  furosemide    Tablet 20 milliGRAM(s) Oral daily      ANTIBIOTICS:  ampicillin/sulbactam  IVPB 3 Gram(s) IV Intermittent every 6 hours

## 2023-08-09 NOTE — CONSULT NOTE ADULT - ATTENDING COMMENTS
Cardiologist:  Christine    Nonspecific anterolateral TWI  Recent aortic valve papillary fibroelastoma resection on 7/12/2023 at [facility]  No ACS/NSTEMI    No chest pain  TTE reviewed - no new RWMA    No further inpatient cardiac workup at this time

## 2023-08-09 NOTE — PHYSICAL THERAPY INITIAL EVALUATION ADULT - GAIT TRAINING, PT EVAL
Pt. will amb at least 100 ft using a rolling walker with Sup by D/C. Pt will ascend and descend at least 8 steps using 1 HR with VCs by D/C

## 2023-08-09 NOTE — PHYSICAL THERAPY INITIAL EVALUATION ADULT - ADDITIONAL COMMENTS
Pt. reports she lives with her  in a private house with 1 flight of stairs from first to second floor. Pt. reports she was fully independent PTA and uses a rolling walker when needed at home.

## 2023-08-09 NOTE — CONSULT NOTE ADULT - SUBJECTIVE AND OBJECTIVE BOX
Cardiology Consult Note     HPI:  Ms. 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 Patient endorses onset of bilateral lower extremity swelling status post aortic valve fibroblastoma s/p removal. Pt noticed the development of a right foot blister that presented last week and has since opened and developed surrounding redness. Her physicians at [facility] prescribed topical abx ointment which did not improve her symptoms. Today her visiting nurse noted patient to have a fever of 101 and the pt made the decision to present to the ED. She denies headache, neck pain, chest pain, shortness of breath, cough, Abd pain, dysuria, hematuria, vomiting, diarrhea.    Vital Signs Last 12 Hrs  T(F): 100.2 (08-06-23 @ 18:48), Max: 100.2 (08-06-23 @ 18:48)  HR: 83 (08-06-23 @ 18:48) (83 - 83)  BP: 143/67 (08-06-23 @ 18:48) (143/67 - 143/67)  RR: 18 (08-06-23 @ 18:48) (18 - 18)  SpO2: 100% (08-06-23 @ 18:48) (100% - 100%)    Labs in the ED are significant for BNP 2302 and Hgb 11.0 (baseline 11.6). Chest x-ray was obtained in the ED pending read. A b/l LE venous duplex was ordered to r/o DVT and Right foot x-ray was ordered to r/o osteomyelitis. (07 Aug 2023 03:59)      PAST MEDICAL & SURGICAL HISTORY:  HTN (hypertension)      Hayfever      Breast cancer      Nonrheumatic mitral valve insufficiency      Papillary fibroelastoma of heart      H/O total hip arthroplasty, left      H/O bilateral mastectomy  BILAT LYMPHEDEMA      History of total hip replacement, bilateral        FAMILY HISTORY:  No pertinent family history in first degree relatives      SOCIAL HISTORY:  unchanged    MEDICATIONS:  enoxaparin Injectable 40 milliGRAM(s) SubCutaneous every 24 hours  furosemide    Tablet 20 milliGRAM(s) Oral daily    ampicillin/sulbactam  IVPB 3 Gram(s) IV Intermittent every 6 hours          atorvastatin 20 milliGRAM(s) Oral at bedtime        REVIEW OF SYSTEMS:  CV: chest pain (-), palpitation (-), orthopnea (-), PND (-), edema (-)  PULM: SOB (-), cough (-), wheezing (-), hemoptysis (-).   CONST: fever (-), chills (-) or fatigue (-)  GI: abdominal distension (-), abdominal pain (-) , nausea/vomiting (-), hematemesis, (-), melena (-), hematochezia (-)  : dysuria (-), frequency (-), hematuria (-).   NEURO: numbness (-), weakness (-), dizziness (-)  SKIN: itching (-), rash (-)  HEENT:  visual changes (-); vertigo or throat pain (-);  neck stiffness (-)     All other review of systems is negative unless indicated above.   -------------------------------------------------------------------------------------------  PHYSICAL EXAM:  T(C): 36.7 (08-09-23 @ 05:08), Max: 37.1 (08-08-23 @ 14:02)  HR: 80 (08-09-23 @ 05:08) (77 - 82)  BP: 122/58 (08-09-23 @ 05:08) (106/51 - 130/62)  RR: 18 (08-09-23 @ 05:08) (18 - 18)  SpO2: --  Wt(kg): --  I&O's Summary    08 Aug 2023 07:01  -  09 Aug 2023 07:00  --------------------------------------------------------  IN: 1141 mL / OUT: 820 mL / NET: 321 mL    09 Aug 2023 07:01  -  09 Aug 2023 11:05  --------------------------------------------------------  IN: 0 mL / OUT: 1000 mL / NET: -1000 mL        GENERAL: NAD  HEAD:  Atraumatic, Normocephalic.  EYES: EOMI, PERRLA, conjunctiva and sclera clear.  ENT: Moist mucous membranes.  NECK: Supple, No JVD.  CHEST/LUNG: Clear to auscultation bilaterally; No rales, rhonchi, wheezing, or rubs. Unlabored respirations.  HEART: Regular rate and rhythm; No murmurs, rubs, or gallops.  ABDOMEN: Bowel sounds present; Soft, Nontender, Nondistended.   EXTREMITIES:  2+ Peripheral Pulses, brisk capillary refill. No clubbing, cyanosis, or edema.  PSYCH: Normal affect.  SKIN: No rashes or lesions.    -------------------------------------------------------------------------------------------  LABS:                          11.4   9.07  )-----------( 324      ( 08 Aug 2023 17:30 )             34.8     08-08    140  |  99  |  26<H>  ----------------------------<  100<H>  3.9   |  30  |  0.9    Ca    9.4      08 Aug 2023 17:30  Mg     1.9     08-08    TPro  6.3  /  Alb  3.6  /  TBili  0.4  /  DBili  x   /  AST  13  /  ALT  16  /  AlkPhos  62  08-08      CARDIAC MARKERS ( 06 Aug 2023 23:46 )  x     / x     / <0.01 ng/mL / x     / x     / x          -------------------------------------------------------------------------------------------  Cardiovascular Diagnostic Testing:    ECG:   < from: 12 Lead ECG (08.06.23 @ 22:12) >  Diagnosis Line Normal sinus rhythm  Possible Left atrial enlargement  T wave abnormality, consider inferior ischemia  Abnormal ECG    < end of copied text >    Echo:   < from: TTE Echo Complete w/o Contrast w/ Doppler (08.07.23 @ 08:25) >  Summary:   1. Left ventricularejection fraction, by visual estimation, is 65 to   70%.   2. Normal global left ventricular systolic function.   3. Spectral Doppler shows pseudonormal pattern of left ventricular   myocardial filling (Grade II diastolic dysfunction).   4. Mildly enlarged left atrium.   5. Mildly enlarged right atrium.   6. Mildly calcified trileaflet aortic valve with normal opening.   7. Mild aortic regurgitation.   8. Mild thickening and calcification of the anterior and posterior   mitral valve leaflets.   9. Mild posterior mitral leaflet prolapse.  10. Mild mitral regurgitation.  11. Mild-moderate tricuspid regurgitation.    < end of copied text >      -------------------------------------------------------------------------------------------                 Cardiology Consult Note     HPI:  Ms. 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 Patient endorses onset of bilateral lower extremity swelling status post aortic valve fibroblastoma s/p removal.     Vital Signs Last 12 Hrs  T(F): 100.2 (08-06-23 @ 18:48), Max: 100.2 (08-06-23 @ 18:48)  HR: 83 (08-06-23 @ 18:48) (83 - 83)  BP: 143/67 (08-06-23 @ 18:48) (143/67 - 143/67)  RR: 18 (08-06-23 @ 18:48) (18 - 18)  SpO2: 100% (08-06-23 @ 18:48) (100% - 100%)    Labs in the ED are significant for BNP 2302 and Hgb 11.0 (baseline 11.6). Chest x-ray was obtained in the ED pending read. A b/l LE venous duplex was ordered to r/o DVT and Right foot x-ray was ordered to r/o osteomyelitis. (07 Aug 2023 03:59)      PAST MEDICAL & SURGICAL HISTORY:  HTN (hypertension)  Hayfever  Breast cancer  Nonrheumatic mitral valve insufficiency  Papillary fibroelastoma of heart  H/O total hip arthroplasty, left  H/O bilateral mastectomy  BILAT LYMPHEDEMA  History of total hip replacement, bilateral    FAMILY HISTORY:  No pertinent family history in first degree relatives      SOCIAL HISTORY:  unchanged    MEDICATIONS:  enoxaparin Injectable 40 milliGRAM(s) SubCutaneous every 24 hours  furosemide    Tablet 20 milliGRAM(s) Oral daily  ampicillin/sulbactam  IVPB 3 Gram(s) IV Intermittent every 6 hours  atorvastatin 20 milliGRAM(s) Oral at bedtime    REVIEW OF SYSTEMS:  CV: chest pain (-), palpitation (-), orthopnea (-), PND (-), edema (-)  PULM: SOB (-), cough (-), wheezing (-), hemoptysis (-).   CONST: fever (-), chills (-) or fatigue (-)  GI: abdominal distension (-), abdominal pain (-) , nausea/vomiting (-), hematemesis, (-), melena (-), hematochezia (-)  : dysuria (-), frequency (-), hematuria (-).   NEURO: numbness (-), weakness (-), dizziness (-)  SKIN: itching (-), rash (-)  HEENT:  visual changes (-); vertigo or throat pain (-);  neck stiffness (-)     All other review of systems is negative unless indicated above.   -------------------------------------------------------------------------------------------  PHYSICAL EXAM:  T(C): 36.7 (08-09-23 @ 05:08), Max: 37.1 (08-08-23 @ 14:02)  HR: 80 (08-09-23 @ 05:08) (77 - 82)  BP: 122/58 (08-09-23 @ 05:08) (106/51 - 130/62)  RR: 18 (08-09-23 @ 05:08) (18 - 18)  SpO2: --  Wt(kg): --  I&O's Summary    08 Aug 2023 07:01  -  09 Aug 2023 07:00  --------------------------------------------------------  IN: 1141 mL / OUT: 820 mL / NET: 321 mL    09 Aug 2023 07:01  -  09 Aug 2023 11:05  --------------------------------------------------------  IN: 0 mL / OUT: 1000 mL / NET: -1000 mL        GENERAL: NAD  HEAD:  Atraumatic, Normocephalic.  EYES: EOMI, PERRLA, conjunctiva and sclera clear.  ENT: Moist mucous membranes.  NECK: Supple, No JVD.  CHEST/LUNG: Clear to auscultation bilaterally; No rales, rhonchi, wheezing, or rubs. Unlabored respirations.  HEART: Regular rate and rhythm; No murmurs, rubs, or gallops.  ABDOMEN: Bowel sounds present; Soft, Nontender, Nondistended.   EXTREMITIES:  2+ Peripheral Pulses, brisk capillary refill. No clubbing, cyanosis, or edema.  PSYCH: Normal affect.  SKIN: No rashes or lesions.    -------------------------------------------------------------------------------------------  LABS:                          11.4   9.07  )-----------( 324      ( 08 Aug 2023 17:30 )             34.8     08-08    140  |  99  |  26<H>  ----------------------------<  100<H>  3.9   |  30  |  0.9    Ca    9.4      08 Aug 2023 17:30  Mg     1.9     08-08    TPro  6.3  /  Alb  3.6  /  TBili  0.4  /  DBili  x   /  AST  13  /  ALT  16  /  AlkPhos  62  08-08      CARDIAC MARKERS ( 06 Aug 2023 23:46 )  x     / x     / <0.01 ng/mL / x     / x     / x          -------------------------------------------------------------------------------------------  Cardiovascular Diagnostic Testing:    ECG:   < from: 12 Lead ECG (08.06.23 @ 22:12) >  Diagnosis Line Normal sinus rhythm  Possible Left atrial enlargement  T wave abnormality, consider inferior ischemia  Abnormal ECG    < end of copied text >    Echo:   < from: TTE Echo Complete w/o Contrast w/ Doppler (08.07.23 @ 08:25) >  Summary:   1. Left ventricularejection fraction, by visual estimation, is 65 to   70%.   2. Normal global left ventricular systolic function.   3. Spectral Doppler shows pseudonormal pattern of left ventricular   myocardial filling (Grade II diastolic dysfunction).   4. Mildly enlarged left atrium.   5. Mildly enlarged right atrium.   6. Mildly calcified trileaflet aortic valve with normal opening.   7. Mild aortic regurgitation.   8. Mild thickening and calcification of the anterior and posterior   mitral valve leaflets.   9. Mild posterior mitral leaflet prolapse.  10. Mild mitral regurgitation.  11. Mild-moderate tricuspid regurgitation.    < end of copied text >      -------------------------------------------------------------------------------------------                 Cardiology Consult Note     HPI:  Ms. 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 Patient endorses onset of bilateral lower extremity swelling status post aortic valve fibroblastoma s/p removal.     Vital Signs Last 12 Hrs  T(F): 100.2 (08-06-23 @ 18:48), Max: 100.2 (08-06-23 @ 18:48)  HR: 83 (08-06-23 @ 18:48) (83 - 83)  BP: 143/67 (08-06-23 @ 18:48) (143/67 - 143/67)  RR: 18 (08-06-23 @ 18:48) (18 - 18)  SpO2: 100% (08-06-23 @ 18:48) (100% - 100%)    Labs in the ED are significant for BNP 2302 and Hgb 11.0 (baseline 11.6). Chest x-ray was obtained in the ED pending read. A b/l LE venous duplex was ordered to r/o DVT and Right foot x-ray was ordered to r/o osteomyelitis. (07 Aug 2023 03:59)      PAST MEDICAL & SURGICAL HISTORY:  HTN (hypertension)  Hayfever  Breast cancer  Nonrheumatic mitral valve insufficiency  Papillary fibroelastoma of heart  H/O total hip arthroplasty, left  H/O bilateral mastectomy  BILAT LYMPHEDEMA  History of total hip replacement, bilateral    FAMILY HISTORY:  No pertinent family history in first degree relatives    SOCIAL HISTORY:  unchanged    MEDICATIONS:  enoxaparin Injectable 40 milliGRAM(s) SubCutaneous every 24 hours  furosemide    Tablet 20 milliGRAM(s) Oral daily  ampicillin/sulbactam  IVPB 3 Gram(s) IV Intermittent every 6 hours  atorvastatin 20 milliGRAM(s) Oral at bedtime    REVIEW OF SYSTEMS:  CV: chest pain (-), palpitation (-), orthopnea (-), PND (-), edema (-)  PULM: SOB (-), cough (-), wheezing (-), hemoptysis (-).   CONST: fever (-), chills (-) or fatigue (-)  GI: abdominal distension (-), abdominal pain (-) , nausea/vomiting (-), hematemesis, (-), melena (-), hematochezia (-)  : dysuria (-), frequency (-), hematuria (-).   NEURO: numbness (-), weakness (-), dizziness (-)  SKIN: itching (-), rash (-)  HEENT:  visual changes (-); vertigo or throat pain (-);  neck stiffness (-)     All other review of systems is negative unless indicated above.   -------------------------------------------------------------------------------------------  PHYSICAL EXAM:  T(C): 36.7 (08-09-23 @ 05:08), Max: 37.1 (08-08-23 @ 14:02)  HR: 80 (08-09-23 @ 05:08) (77 - 82)  BP: 122/58 (08-09-23 @ 05:08) (106/51 - 130/62)  RR: 18 (08-09-23 @ 05:08) (18 - 18)  SpO2: --  Wt(kg): --  I&O's Summary    08 Aug 2023 07:01  -  09 Aug 2023 07:00  --------------------------------------------------------  IN: 1141 mL / OUT: 820 mL / NET: 321 mL    09 Aug 2023 07:01  -  09 Aug 2023 11:05  --------------------------------------------------------  IN: 0 mL / OUT: 1000 mL / NET: -1000 mL      GENERAL: NAD  HEAD:  Atraumatic, Normocephalic.  EYES: EOMI, PERRLA, conjunctiva and sclera clear.  ENT: Moist mucous membranes.  NECK: Supple, No JVD.  CHEST/LUNG: Clear to auscultation bilaterally; No rales, rhonchi, wheezing, or rubs  HEART: Regular rate and rhythm; No murmurs, rubs, or gallops.  ABDOMEN: Soft, Nontender, Nondistended.   EXTREMITIES: no clubbing, cyanosis, or edema.  PSYCH: Normal affect.    -------------------------------------------------------------------------------------------  LABS:                          11.4   9.07  )-----------( 324      ( 08 Aug 2023 17:30 )             34.8     08-08    140  |  99  |  26<H>  ----------------------------<  100<H>  3.9   |  30  |  0.9    Ca    9.4      08 Aug 2023 17:30  Mg     1.9     08-08    TPro  6.3  /  Alb  3.6  /  TBili  0.4  /  DBili  x   /  AST  13  /  ALT  16  /  AlkPhos  62  08-08      CARDIAC MARKERS ( 06 Aug 2023 23:46 )  x     / x     / <0.01 ng/mL / x     / x     / x          -------------------------------------------------------------------------------------------  Cardiovascular Diagnostic Testing:    ECG:   < from: 12 Lead ECG (08.06.23 @ 22:12) >  Diagnosis Line Normal sinus rhythm  Possible Left atrial enlargement  T wave abnormality, consider inferior ischemia  Abnormal ECG    < end of copied text >      < from: TTE Echo Complete w/o Contrast w/ Doppler (08.07.23 @ 08:25) >  Summary:   1. Left ventricular ejection fraction, by visual estimation, is 65 to   70%.   2. Normal global left ventricular systolic function.   3. Spectral Doppler shows pseudonormal pattern of left ventricular   myocardial filling (Grade II diastolic dysfunction).   4. Mildly enlarged left atrium.   5. Mildly enlarged right atrium.   6. Mildly calcified trileaflet aortic valve with normal opening.   7. Mild aortic regurgitation.   8. Mild thickening and calcification of the anterior and posterior   mitral valve leaflets.   9. Mild posterior mitral leaflet prolapse.  10. Mild mitral regurgitation.  11. Mild-moderate tricuspid regurgitation.    < end of copied text >      -------------------------------------------------------------------------------------------

## 2023-08-09 NOTE — PROGRESS NOTE ADULT - SUBJECTIVE AND OBJECTIVE BOX
24H events:    Patient is a 78y old Female who presents with a chief complaint of Sepsis (07 Aug 2023 14:08)    Primary diagnosis of Cellulitis      Day 0: ID consulted, blood cultures were drawn, Echo showed EF 65-70%, G2 diastol dysfxn. Podiatry did not rec surg intervention.  Day 1: ID rec'd unasyn and vanc for R foot abscess.      Today is 2d of hospitalization. This morning patient was seen and examined at bedside, resting comfortably in bed.    No acute or major events overnight on telemetry. reports minimal-to-no R foot pain at the site of wound.  spoke with podiatry for possible debridement for abscess.  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     Code Status: full code      PAST MEDICAL & SURGICAL HISTORY  HTN (hypertension)    Hayfever    Breast cancer    Nonrheumatic mitral valve insufficiency    Papillary fibroelastoma of heart    H/O total hip arthroplasty, left    H/O bilateral mastectomy  BILAT LYMPHEDEMA    History of total hip replacement, bilateral      SOCIAL HISTORY:  Social History:  no alcohol, smoking hx (07 Aug 2023 03:59)      ALLERGIES:  No Known Allergies    MEDICATIONS:  STANDING MEDICATIONS  atorvastatin 20 milliGRAM(s) Oral at bedtime  cefTRIAXone   IVPB 1000 milliGRAM(s) IV Intermittent every 24 hours  enoxaparin Injectable 40 milliGRAM(s) SubCutaneous every 24 hours  furosemide    Tablet 20 milliGRAM(s) Oral daily  metroNIDAZOLE  IVPB 500 milliGRAM(s) IV Intermittent every 8 hours  vancomycin  IVPB 1000 milliGRAM(s) IV Intermittent every 24 hours    PRN MEDICATIONS    VITALS:   T(F): 98.4  HR: 74  BP: 126/60  RR: 18  SpO2: 97%    PHYSICAL EXAM:  GENERAL:   ( x) NAD, lying in bed comfortably     (  ) obtunded     (  ) lethargic     (  ) somnolent    HEAD:   ( x) Atraumatic     (  ) hematoma     (  ) laceration (specify location:       )     NECK:  (x) Supple     (  ) neck stiffness     (  ) nuchal rigidity     (  )  no JVD     (  ) JVD present ( -- cm)    HEART:  Rate -->     (x) normal rate     (  ) bradycardic     (  ) tachycardic  Rhythm -->     (x) regular     (  ) regularly irregular     (  ) irregularly irregular  Murmurs -->     (x) normal s1s2     (  ) systolic murmur     (  ) diastolic murmur     (  ) continuous murmur      (  ) S3 present     (  ) S4 present    LUNGS:   ( x)Unlabored respirations     (  ) tachypnea  ( x) B/L air entry     (  ) decreased breath sounds in:  (location     )    ( x) no adventitious sound     (  ) crackles     (  ) wheezing      (  ) rhonchi      (specify location:       )  (  ) chest wall tenderness (specify location:       )    ABDOMEN:   ( x) Soft     (  ) tense   |   (  ) nondistended     (  ) distended   |   (  ) +BS     (  ) hypoactive bowel sounds     (  ) hyperactive bowel sounds  ( x) nontender     (  ) RUQ tenderness     (  ) RLQ tenderness     (  ) LLQ tenderness     (  ) epigastric tenderness     (  ) diffuse tenderness  (  ) Splenomegaly      (  ) Hepatomegaly      (  ) Jaundice     (  ) ecchymosis     EXTREMITIES:  ( ) Normal   (x) RLE erythema from foot to shin (  ) ecchymosis     (x) varicose veins      (  ) pitting edema     (x) RLE non-pitting edema   (x) dorsal foot ulceration     (  ) gangrene:       NERVOUS SYSTEM:    ( x) A&Ox3     (  ) confused     (  ) lethargic  CN II-XII:     ( x) Intact     (  ) deficits found     (Specify:     )   Upper extremities:     (  ) no sensorimotor deficits     (  ) weakness     (  ) loss of proprioception/vibration     (  ) loss of touch/temperature (specify:    )  Lower extremities:     (  ) no sensorimotor deficits     (  ) weakness     (  ) loss of proprioception/vibration     (  ) loss of touch/temperature (specify:    )    SKIN:   (  ) No rashes or lesions     (  ) maculopapular rash     (  ) pustules     (  ) vesicles     (  ) ulcer     (  ) ecchymosis     (specify location:     )    AMPAC score :    (  ) Indwelling Liang Catheter:   Date insterted:    Reason (  ) Critical illness     (  ) urinary retention    (  ) Accurate Ins/Outs Monitoring     (  ) CMO patient    (  ) Central Line:   Date inserted:  Location: (  ) Right IJ     (  ) Left IJ     (  ) Right Fem     (  ) Left Fem    (  ) SPC        (  ) pigtail       (  ) PEG tube       (  ) colostomy       (  ) jejunostomy  (  ) U-Dall    LABS:                        11.0   7.54  )-----------( 309      ( 06 Aug 2023 23:46 )             34.6     08-06    138  |  98  |  20  ----------------------------<  125<H>  5.0   |  28  |  0.9    Ca    9.6      06 Aug 2023 23:46    TPro  6.6  /  Alb  3.9  /  TBili  1.1  /  DBili  x   /  AST  28  /  ALT  25  /  AlkPhos  66  08-06      Urinalysis Basic - ( 06 Aug 2023 23:46 )    Color: x / Appearance: x / SG: x / pH: x  Gluc: 125 mg/dL / Ketone: x  / Bili: x / Urobili: x   Blood: x / Protein: x / Nitrite: x   Leuk Esterase: x / RBC: x / WBC x   Sq Epi: x / Non Sq Epi: x / Bacteria: x            CARDIAC MARKERS ( 06 Aug 2023 23:46 )  x     / <0.01 ng/mL / x     / x     / x          RADIOLOGY:    < from: [facility] Duplex Lower Ext Vein Scan, Bilat (08.06.23 @ 21:25) >  IMPRESSION:  No evidence of deep venous thrombosis in either lower extremity.    < end of copied text >  < from: Xray Foot AP + Lateral + Oblique, Right (08.06.23 @ 20:48) >  IMPRESSION:    Diffuse soft tissue swelling.    < end of copied text >  < from: Xray Foot AP + Lateral + Oblique, Right (08.06.23 @ 20:48) >  No radiographic evidence to suggest osteomyelitis.    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 Patient endorses onset of bilateral lower extremity swelling status post aortic valve fibroblastoma s/p removal. Pt noticed the development of a right foot blister that presented last week and has since opened and developed surrounding redness. Her physicians at [facility] prescribed topical abx ointment which did not improve her symptoms. Today her visiting nurse noted patient to have a fever of 101 and the pt made the decision to present to the ED.     #Right LE abscess w/ Cellulitis  #r/o Osteomyelitis (unlikely)  - Sepsis not present on admission  - check bcx  - elevate extremity  - start vanco, unasyn  - R foot XR did not show signs of osteomyelitis  - f/u podiatry if rec tx for foot abscess  - ESR/CRP both elevated 70's    #Papillary aortic valve fibroblastoma s/p removal on 7/12/23  #Acute CHF  #Mitral regurgitation  #Tricuspid regurgitation  #Pulmonary hypertension  #Hypertension  - BNP 2303; b/l LE +2 pitting edema  - Pt states legs developed edema s/p fibroelastoma removal; She was started on Lasix 20mg by [facility]  - Pt was also started on Amiodarone 200mg qd and Methylprednisolone 4mg qd but is unsure why. PLEASE obtain records from [facility] and resume these meds if indicated.  - EKG NSR, nad, twi inf leads, slight st dep V4-V6   - Last TTE 8/2022: Normal global left ventricular systolic function.  - 8/2023: Echo showed EF 65-70%, G2 diastol dysfxn.   - c/w lasix 20mg PO (home dose)  - b/l LE venous duplex   - Pt follows with Cardiologist Dr King      #Hyperlipidemia  - on Crestor at home; c/w Atorvastatin    #Misc  - DVT Prophylaxis: Lovenox  - GI Prophylaxis: Not Needed  - Diet: DASH  - Activity: IAT  - Code Status: Full      ---------------------------------------------------------------------------------------------------------------------------------  #Handoff  - f/u pod recs, f/u final cultures

## 2023-08-09 NOTE — PROGRESS NOTE ADULT - SUBJECTIVE AND OBJECTIVE BOX
KELSY ROYAL  78y Female    CHIEF COMPLAINT:    Patient is a 78y old  Female who presents with a chief complaint of Sepsis (09 Aug 2023 12:43)      INTERVAL HPI/OVERNIGHT EVENTS:    Patient seen and examined.    ROS: All other systems are negative.    Vital Signs:    T(F): 99.2 (08-09-23 @ 12:09), Max: 99.2 (08-09-23 @ 12:09)  HR: 95 (08-09-23 @ 12:09) (80 - 95)  BP: 111/52 (08-09-23 @ 12:09) (111/52 - 130/62)  RR: 18 (08-09-23 @ 12:09) (18 - 18)  SpO2: --  I&O's Summary    08 Aug 2023 07:01  -  09 Aug 2023 07:00  --------------------------------------------------------  IN: 1141 mL / OUT: 820 mL / NET: 321 mL    09 Aug 2023 07:01  -  09 Aug 2023 17:20  --------------------------------------------------------  IN: 240 mL / OUT: 1000 mL / NET: -760 mL      Daily     Daily   CAPILLARY BLOOD GLUCOSE          PHYSICAL EXAM:    GENERAL:  NAD  SKIN: No rashes or lesions  HENT: Atraumatic. Normocephalic. PERRL. Moist membranes.  NECK: Supple, No JVD. No lymphadenopathy.  PULMONARY: CTA B/L. No wheezing. No rales  CVS: Normal S1, S2. Rate and Rhythm are regular. No murmurs.  ABDOMEN/GI: Soft, Nontender, Nondistended; BS present  EXTREMITIES: Peripheral pulses intact. No edema B/L LE.  NEUROLOGIC:  No motor or sensory deficit.  PSYCH: Alert & oriented x 3    Consultant(s) Notes Reviewed:  [x ] YES  [ ] NO  Care Discussed with Consultants/Other Providers [ x] YES  [ ] NO    EKG reviewed  Telemetry reviewed    LABS:                        11.4   9.07  )-----------( 324      ( 08 Aug 2023 17:30 )             34.8     08-08    140  |  99  |  26<H>  ----------------------------<  100<H>  3.9   |  30  |  0.9    Ca    9.4      08 Aug 2023 17:30  Mg     1.9     08-08    TPro  6.3  /  Alb  3.6  /  TBili  0.4  /  DBili  x   /  AST  13  /  ALT  16  /  AlkPhos  62  08-08        Trop <0.01, CKMB --, CK --, 08-06-23 @ 23:46      Culture - Blood (collected 07 Aug 2023 00:40)  Source: .Blood Blood  Preliminary Report (09 Aug 2023 09:01):    No growth at 48 Hours    Culture - Blood (collected 07 Aug 2023 00:40)  Source: .Blood Blood  Preliminary Report (09 Aug 2023 09:01):    No growth at 48 Hours        RADIOLOGY & ADDITIONAL TESTS:      Imaging or report Personally Reviewed:  [ ] YES  [ ] NO    Medications:  Standing  ampicillin/sulbactam  IVPB 3 Gram(s) IV Intermittent every 6 hours  atorvastatin 20 milliGRAM(s) Oral at bedtime  enoxaparin Injectable 40 milliGRAM(s) SubCutaneous every 24 hours  furosemide    Tablet 20 milliGRAM(s) Oral daily  vancomycin  IVPB 1250 milliGRAM(s) IV Intermittent every 24 hours    PRN Meds      Case discussed with resident    Care discussed with pt/family

## 2023-08-09 NOTE — PROGRESS NOTE ADULT - SUBJECTIVE AND OBJECTIVE BOX
Podiatry Progress Note    Subjective:  KELSY ROYAL is a  78y Female.   Seen bedside.   Patient is a 78y old  Female who presents with a chief complaint of Sepsis (09 Aug 2023 12:12)      Past Medical History and Surgical History  PAST MEDICAL & SURGICAL HISTORY:  HTN (hypertension)      Hayfever      Breast cancer      Nonrheumatic mitral valve insufficiency      Papillary fibroelastoma of heart      H/O total hip arthroplasty, left      H/O bilateral mastectomy  BILAT LYMPHEDEMA      History of total hip replacement, bilateral           Objective:  Vital Signs Last 24 Hrs  T(C): 36.7 (09 Aug 2023 05:08), Max: 37.1 (08 Aug 2023 14:02)  T(F): 98.1 (09 Aug 2023 05:08), Max: 98.8 (08 Aug 2023 14:02)  HR: 80 (09 Aug 2023 05:08) (77 - 82)  BP: 122/58 (09 Aug 2023 05:08) (106/51 - 130/62)  BP(mean): --  RR: 18 (09 Aug 2023 05:08) (18 - 18)  SpO2: --                            11.4   9.07  )-----------( 324      ( 08 Aug 2023 17:30 )             34.8                 08-08    140  |  99  |  26<H>  ----------------------------<  100<H>  3.9   |  30  |  0.9    Ca    9.4      08 Aug 2023 17:30  Mg     1.9     08-08    TPro  6.3  /  Alb  3.6  /  TBili  0.4  /  DBili  x   /  AST  13  /  ALT  16  /  AlkPhos  62  08-08      Radiology:  XR_RightFoot 08/06/23  No acute displaced fracture is demonstrated.  No radiographic evidence to suggest osteomyelitis.  Diffuse soft tissue swelling.  Small spur arising from the posterior aspect of the calcaneus.  Thickening of the Achilles tendon.  Shortening of the fourth metatarsal.  Mild hallux valgus deformity of the first toe.  Diffuse osteopenia.    IMPRESSION:  Diffuse soft tissue swelling.    Physical Exam - Lower Extremity Focused:   Derm:   Cellulitic right lower extremity from foot up to anterior leg.  Unilateral edema. Warm to palpation > contralateral limb  Partial thickness wound to dorsum of right foot; periwound erythema. Serous drainage. Now with fluctuance. mild malodors.   Vascular: DP and PT Pulses palpable; Foot is Warm to Warm to the touch; Capillary Refill Time < 3 Seconds;    Neuro: Protective Sensation intact  MSK: Moderate Pain On Palpation at Wound Site     Assessment:  Cellulitic Right Lower Extremity     Plan:  Chart reviewed and Patient evaluated. All Questions and Concerns Addressed and Answered  XR Imaging R Foot; results reviewed; soft tissue edema   Weight Bearing Status; WBAT   Request medical clearance;   Plan for OR Thursday 08/10/23 @ 3pm; Incision and drainage with deep wound cultures of right foot;   Please make patient NPO by midnight tonight 8/9  T&S and COAG studies prior to OR;   Discussed Plan w/ Dr. Lott     Podiatry

## 2023-08-09 NOTE — PROGRESS NOTE ADULT - ADDITIONAL PE
R foot : purulent drainage from forefoot
R foot base of 2-3 toes with sloughed tissue with purulence with edema/erythema. Good pulses

## 2023-08-10 ENCOUNTER — RESULT REVIEW (OUTPATIENT)
Age: 78
End: 2023-08-10

## 2023-08-10 LAB
ALBUMIN SERPL ELPH-MCNC: 3.4 G/DL — LOW (ref 3.5–5.2)
ALP SERPL-CCNC: 64 U/L — SIGNIFICANT CHANGE UP (ref 30–115)
ALT FLD-CCNC: 15 U/L — SIGNIFICANT CHANGE UP (ref 0–41)
ANION GAP SERPL CALC-SCNC: 17 MMOL/L — HIGH (ref 7–14)
APTT BLD: 29.1 SEC — SIGNIFICANT CHANGE UP (ref 27–39.2)
AST SERPL-CCNC: 15 U/L — SIGNIFICANT CHANGE UP (ref 0–41)
BASOPHILS # BLD AUTO: 0.04 K/UL — SIGNIFICANT CHANGE UP (ref 0–0.2)
BASOPHILS NFR BLD AUTO: 0.5 % — SIGNIFICANT CHANGE UP (ref 0–1)
BILIRUB SERPL-MCNC: 0.4 MG/DL — SIGNIFICANT CHANGE UP (ref 0.2–1.2)
BLD GP AB SCN SERPL QL: SIGNIFICANT CHANGE UP
BUN SERPL-MCNC: 27 MG/DL — HIGH (ref 10–20)
CALCIUM SERPL-MCNC: 8.9 MG/DL — SIGNIFICANT CHANGE UP (ref 8.4–10.5)
CHLORIDE SERPL-SCNC: 100 MMOL/L — SIGNIFICANT CHANGE UP (ref 98–110)
CO2 SERPL-SCNC: 26 MMOL/L — SIGNIFICANT CHANGE UP (ref 17–32)
CREAT SERPL-MCNC: 1 MG/DL — SIGNIFICANT CHANGE UP (ref 0.7–1.5)
EGFR: 58 ML/MIN/1.73M2 — LOW
EOSINOPHIL # BLD AUTO: 0.76 K/UL — HIGH (ref 0–0.7)
EOSINOPHIL NFR BLD AUTO: 10.2 % — HIGH (ref 0–8)
GLUCOSE SERPL-MCNC: 58 MG/DL — LOW (ref 70–99)
HCT VFR BLD CALC: 35.1 % — LOW (ref 37–47)
HGB BLD-MCNC: 11.3 G/DL — LOW (ref 12–16)
IMM GRANULOCYTES NFR BLD AUTO: 0.4 % — HIGH (ref 0.1–0.3)
INR BLD: 1.19 RATIO — SIGNIFICANT CHANGE UP (ref 0.65–1.3)
LYMPHOCYTES # BLD AUTO: 0.71 K/UL — LOW (ref 1.2–3.4)
LYMPHOCYTES # BLD AUTO: 9.5 % — LOW (ref 20.5–51.1)
MAGNESIUM SERPL-MCNC: 2.1 MG/DL — SIGNIFICANT CHANGE UP (ref 1.8–2.4)
MAGNESIUM SERPL-MCNC: 2.2 MG/DL — SIGNIFICANT CHANGE UP (ref 1.8–2.4)
MCHC RBC-ENTMCNC: 31.1 PG — HIGH (ref 27–31)
MCHC RBC-ENTMCNC: 32.2 G/DL — SIGNIFICANT CHANGE UP (ref 32–37)
MCV RBC AUTO: 96.7 FL — SIGNIFICANT CHANGE UP (ref 81–99)
MONOCYTES # BLD AUTO: 1.08 K/UL — HIGH (ref 0.1–0.6)
MONOCYTES NFR BLD AUTO: 14.4 % — HIGH (ref 1.7–9.3)
NEUTROPHILS # BLD AUTO: 4.86 K/UL — SIGNIFICANT CHANGE UP (ref 1.4–6.5)
NEUTROPHILS NFR BLD AUTO: 65 % — SIGNIFICANT CHANGE UP (ref 42.2–75.2)
NRBC # BLD: 0 /100 WBCS — SIGNIFICANT CHANGE UP (ref 0–0)
PLATELET # BLD AUTO: 347 K/UL — SIGNIFICANT CHANGE UP (ref 130–400)
PMV BLD: 9.4 FL — SIGNIFICANT CHANGE UP (ref 7.4–10.4)
POTASSIUM SERPL-MCNC: 4.2 MMOL/L — SIGNIFICANT CHANGE UP (ref 3.5–5)
POTASSIUM SERPL-SCNC: 4.2 MMOL/L — SIGNIFICANT CHANGE UP (ref 3.5–5)
PROT SERPL-MCNC: 6.2 G/DL — SIGNIFICANT CHANGE UP (ref 6–8)
PROTHROM AB SERPL-ACNC: 13.6 SEC — HIGH (ref 9.95–12.87)
RBC # BLD: 3.63 M/UL — LOW (ref 4.2–5.4)
RBC # FLD: 14.5 % — SIGNIFICANT CHANGE UP (ref 11.5–14.5)
SODIUM SERPL-SCNC: 143 MMOL/L — SIGNIFICANT CHANGE UP (ref 135–146)
VANCOMYCIN TROUGH SERPL-MCNC: 13 UG/ML — HIGH (ref 5–10)
WBC # BLD: 7.48 K/UL — SIGNIFICANT CHANGE UP (ref 4.8–10.8)
WBC # FLD AUTO: 7.48 K/UL — SIGNIFICANT CHANGE UP (ref 4.8–10.8)

## 2023-08-10 PROCEDURE — 88302 TISSUE EXAM BY PATHOLOGIST: CPT | Mod: 26

## 2023-08-10 PROCEDURE — 99232 SBSQ HOSP IP/OBS MODERATE 35: CPT

## 2023-08-10 PROCEDURE — 28003 TREATMENT OF FOOT INFECTION: CPT

## 2023-08-10 RX ORDER — FUROSEMIDE 40 MG
20 TABLET ORAL DAILY
Refills: 0 | Status: DISCONTINUED | OUTPATIENT
Start: 2023-08-10 | End: 2023-08-12

## 2023-08-10 RX ORDER — HYDROMORPHONE HYDROCHLORIDE 2 MG/ML
0.5 INJECTION INTRAMUSCULAR; INTRAVENOUS; SUBCUTANEOUS
Refills: 0 | Status: DISCONTINUED | OUTPATIENT
Start: 2023-08-10 | End: 2023-08-11

## 2023-08-10 RX ORDER — AMPICILLIN SODIUM AND SULBACTAM SODIUM 250; 125 MG/ML; MG/ML
3 INJECTION, POWDER, FOR SUSPENSION INTRAMUSCULAR; INTRAVENOUS EVERY 6 HOURS
Refills: 0 | Status: DISCONTINUED | OUTPATIENT
Start: 2023-08-10 | End: 2023-08-11

## 2023-08-10 RX ORDER — ENOXAPARIN SODIUM 100 MG/ML
40 INJECTION SUBCUTANEOUS EVERY 24 HOURS
Refills: 0 | Status: DISCONTINUED | OUTPATIENT
Start: 2023-08-10 | End: 2023-08-12

## 2023-08-10 RX ORDER — ONDANSETRON 8 MG/1
4 TABLET, FILM COATED ORAL ONCE
Refills: 0 | Status: DISCONTINUED | OUTPATIENT
Start: 2023-08-10 | End: 2023-08-11

## 2023-08-10 RX ORDER — SODIUM CHLORIDE 9 MG/ML
1000 INJECTION, SOLUTION INTRAVENOUS
Refills: 0 | Status: DISCONTINUED | OUTPATIENT
Start: 2023-08-10 | End: 2023-08-11

## 2023-08-10 RX ORDER — SODIUM CHLORIDE 9 MG/ML
1000 INJECTION, SOLUTION INTRAVENOUS
Refills: 0 | Status: DISCONTINUED | OUTPATIENT
Start: 2023-08-10 | End: 2023-08-10

## 2023-08-10 RX ADMIN — ENOXAPARIN SODIUM 40 MILLIGRAM(S): 100 INJECTION SUBCUTANEOUS at 23:15

## 2023-08-10 RX ADMIN — Medication 166.67 MILLIGRAM(S): at 10:03

## 2023-08-10 RX ADMIN — Medication 20 MILLIGRAM(S): at 06:13

## 2023-08-10 RX ADMIN — AMPICILLIN SODIUM AND SULBACTAM SODIUM 200 GRAM(S): 250; 125 INJECTION, POWDER, FOR SUSPENSION INTRAMUSCULAR; INTRAVENOUS at 13:10

## 2023-08-10 RX ADMIN — AMPICILLIN SODIUM AND SULBACTAM SODIUM 200 GRAM(S): 250; 125 INJECTION, POWDER, FOR SUSPENSION INTRAMUSCULAR; INTRAVENOUS at 08:05

## 2023-08-10 RX ADMIN — AMPICILLIN SODIUM AND SULBACTAM SODIUM 200 GRAM(S): 250; 125 INJECTION, POWDER, FOR SUSPENSION INTRAMUSCULAR; INTRAVENOUS at 23:14

## 2023-08-10 RX ADMIN — ENOXAPARIN SODIUM 40 MILLIGRAM(S): 100 INJECTION SUBCUTANEOUS at 06:13

## 2023-08-10 RX ADMIN — SODIUM CHLORIDE 50 MILLILITER(S): 9 INJECTION, SOLUTION INTRAVENOUS at 14:23

## 2023-08-10 RX ADMIN — AMPICILLIN SODIUM AND SULBACTAM SODIUM 200 GRAM(S): 250; 125 INJECTION, POWDER, FOR SUSPENSION INTRAMUSCULAR; INTRAVENOUS at 01:03

## 2023-08-10 NOTE — CHART NOTE - NSCHARTNOTEFT_GEN_A_CORE
PACU ANESTHESIA ADMISSION NOTE      Procedure: Incision and drainage, foot, bursa, single space      Post op diagnosis:  Abscess of foot        ____  Intubated  TV:______       Rate: ______      FiO2: ______    __x__  Patent Airway    _x___  Full return of protective reflexes    ___x_  Full recovery from anesthesia / back to baseline status    Vitals:  T(F): 97.7 (08-10-23 @ 20:22), Max: 36.9 (08-10-23 @ 17:45)  HR: 83 (08-10-23 @ 20:22) (86 - 89)  BP: 172/69 (08-10-23 @ 20:22) (119/56 - 143/63)  RR: 14 (08-10-23 @ 20:22) (15 - 18)  SpO2: 98% (08-10-23 @ 20:22) (97% - 97%)    Mental Status:  __x__ Awake   ____x_ Alert   _____ Drowsy   _____ Sedated    Nausea/Vomiting:  __x__ NO  ______Yes,   See Post - Op Orders          Pain Scale (0-10):  ___5__    Treatment: ____ None    ___x_ See Post - Op/PCA Orders    Post - Operative Fluids:   ____ Oral   __x__ See Post - Op Orders    Plan: Discharge:   ____Home       __x___Floor     _____Critical Care    _____  Other:_________________    Comments: Transferred care to PACU RN; discharge to floor when criteria met.

## 2023-08-10 NOTE — PROGRESS NOTE ADULT - SUBJECTIVE AND OBJECTIVE BOX
KELSY ROYAL  78y Female    CHIEF COMPLAINT:    Patient is a 78y old  Female who presents with a chief complaint of Sepsis (10 Aug 2023 11:04)      INTERVAL HPI/OVERNIGHT EVENTS:    Patient seen and examined. No fever. No cold or chills. No cp. Scheduled for debridement today    ROS: All other systems are negative.    Vital Signs:    T(F): 97.8 (08-10-23 @ 04:21), Max: 99.2 (23 @ 12:09)  HR: 87 (08-10-23 @ 04:21) (87 - 95)  BP: 119/56 (08-10-23 @ 04:21) (111/52 - 119/56)  RR: 18 (08-10-23 @ 04:21) (18 - 18)  SpO2: --  I&O's Summary    09 Aug 2023 07:01  -  10 Aug 2023 07:00  --------------------------------------------------------  IN: 833 mL / OUT: 1175 mL / NET: -342 mL    10 Aug 2023 07:01  -  10 Aug 2023 12:03  --------------------------------------------------------  IN: 0 mL / OUT: 800 mL / NET: -800 mL      Daily     Daily Weight in k.9 (10 Aug 2023 04:21)  CAPILLARY BLOOD GLUCOSE          PHYSICAL EXAM:    GENERAL:  NAD  SKIN: No rashes or lesions  HENT: Atraumatic. Normocephalic. PERRL. Moist membranes.  NECK: Supple, No JVD. No lymphadenopathy.  PULMONARY: CTA B/L. No wheezing. No rales  CVS: Normal S1, S2. Rate and Rhythm are regular. No murmurs.  ABDOMEN/GI: Soft, Nontender, Nondistended; BS present  EXTREMITIES: Peripheral pulses intact. No edema B/L LE.  NEUROLOGIC:  No motor or sensory deficit.  PSYCH: Alert & oriented x 3    Consultant(s) Notes Reviewed:  [x ] YES  [ ] NO  Care Discussed with Consultants/Other Providers [ x] YES  [ ] NO    EKG reviewed  Telemetry reviewed    LABS:                        11.3   7.48  )-----------( 347      ( 09 Aug 2023 23:38 )             35.1     08    143  |  100  |  27<H>  ----------------------------<  58<L>  4.2   |  26  |  1.0    Ca    8.9      09 Aug 2023 23:38  Mg     2.2     08-10    TPro  6.2  /  Alb  3.4<L>  /  TBili  0.4  /  DBili  x   /  AST  15  /  ALT  15  /  AlkPhos  64  08    PT/INR - ( 09 Aug 2023 23:38 )   PT: 13.60 sec;   INR: 1.19 ratio         PTT - ( 09 Aug 2023 23:38 )  PTT:29.1 sec        Culture - Blood (collected 08 Aug 2023 19:22)  Source: .Blood Blood-Peripheral  Preliminary Report (10 Aug 2023 01:03):    No growth at 24 hours        RADIOLOGY & ADDITIONAL TESTS:      Imaging or report Personally Reviewed:  [ ] YES  [ ] NO    Medications:  Standing  ampicillin/sulbactam  IVPB 3 Gram(s) IV Intermittent every 6 hours  atorvastatin 20 milliGRAM(s) Oral at bedtime  enoxaparin Injectable 40 milliGRAM(s) SubCutaneous every 24 hours  furosemide    Tablet 20 milliGRAM(s) Oral daily  vancomycin  IVPB 1250 milliGRAM(s) IV Intermittent every 24 hours    PRN Meds      Case discussed with resident    Care discussed with pt/family

## 2023-08-10 NOTE — PHARMACOTHERAPY INTERVENTION NOTE - COMMENTS
Patient currently receiving vancomycin 1250 mg IV q24h for subcutaneous abscess with surrounding cellulitis. Per [employer] Bayesian vancomycin dosing software, current regimen of vancomycin 1250 mg IV q24h results in a supratherapeutic steady-state AUC/YARA of 633.94 mg/L*hr (goal 400-600). A reduced regimen of 1000 mg IV q24h predicts a therapeutic steady-state AUC/YARA of 507.15 mg/L*hr. Recommended decreasing vancomycin dose to 1000 mg IV q24h starting 8/10 at 10:00.

## 2023-08-10 NOTE — PROGRESS NOTE ADULT - SUBJECTIVE AND OBJECTIVE BOX
24H events:    Patient is a 78y old Female who presents with a chief complaint of Sepsis (07 Aug 2023 14:08)    Primary diagnosis of Cellulitis      Day 0: ID consulted, blood cultures were drawn, Echo showed EF 65-70%, G2 diastol dysfxn. Podiatry did not rec surg intervention.  Day 1: ID rec'd unasyn and vanc for R foot abscess.      Today is 3d of hospitalization. This morning patient was seen and examined at bedside, resting comfortably in bed.    No acute or major events overnight on telemetry. reports minimal-to-no R foot pain at the site of wound.  Pending foot debridement    Code Status: full code      PAST MEDICAL & SURGICAL HISTORY  HTN (hypertension)    Hayfever    Breast cancer    Nonrheumatic mitral valve insufficiency    Papillary fibroelastoma of heart    H/O total hip arthroplasty, left    H/O bilateral mastectomy  BILAT LYMPHEDEMA    History of total hip replacement, bilateral      SOCIAL HISTORY:  Social History:  no alcohol, smoking hx (07 Aug 2023 03:59)      ALLERGIES:  No Known Allergies    MEDICATIONS:  STANDING MEDICATIONS  MEDICATIONS  (STANDING):  ampicillin/sulbactam  IVPB 3 Gram(s) IV Intermittent every 6 hours  atorvastatin 20 milliGRAM(s) Oral at bedtime  enoxaparin Injectable 40 milliGRAM(s) SubCutaneous every 24 hours  furosemide    Tablet 20 milliGRAM(s) Oral daily  vancomycin  IVPB 1250 milliGRAM(s) IV Intermittent every 24 hours      PRN MEDICATIONS    VITALS:   T(F): 98.4  HR: 74  BP: 126/60  RR: 18  SpO2: 97%    PHYSICAL EXAM:  GENERAL:   ( x) NAD, lying in bed comfortably     (  ) obtunded     (  ) lethargic     (  ) somnolent    HEAD:   ( x) Atraumatic     (  ) hematoma     (  ) laceration (specify location:       )     NECK:  (x) Supple     (  ) neck stiffness     (  ) nuchal rigidity     (  )  no JVD     (  ) JVD present ( -- cm)    HEART:  Rate -->     (x) normal rate     (  ) bradycardic     (  ) tachycardic  Rhythm -->     (x) regular     (  ) regularly irregular     (  ) irregularly irregular  Murmurs -->     (x) normal s1s2     (  ) systolic murmur     (  ) diastolic murmur     (  ) continuous murmur      (  ) S3 present     (  ) S4 present    LUNGS:   ( x)Unlabored respirations     (  ) tachypnea  ( x) B/L air entry     (  ) decreased breath sounds in:  (location     )    ( x) no adventitious sound     (  ) crackles     (  ) wheezing      (  ) rhonchi      (specify location:       )  (  ) chest wall tenderness (specify location:       )    ABDOMEN:   ( x) Soft     (  ) tense   |   (  ) nondistended     (  ) distended   |   (  ) +BS     (  ) hypoactive bowel sounds     (  ) hyperactive bowel sounds  ( x) nontender     (  ) RUQ tenderness     (  ) RLQ tenderness     (  ) LLQ tenderness     (  ) epigastric tenderness     (  ) diffuse tenderness  (  ) Splenomegaly      (  ) Hepatomegaly      (  ) Jaundice     (  ) ecchymosis     EXTREMITIES:  ( ) Normal   (x) RLE erythema from foot to shin (  ) ecchymosis     (x) varicose veins      (  ) pitting edema     (x) RLE non-pitting edema   (x) dorsal foot ulceration     (  ) gangrene:       NERVOUS SYSTEM:    ( x) A&Ox3     (  ) confused     (  ) lethargic  CN II-XII:     ( x) Intact     (  ) deficits found     (Specify:     )   Upper extremities:     (  ) no sensorimotor deficits     (  ) weakness     (  ) loss of proprioception/vibration     (  ) loss of touch/temperature (specify:    )  Lower extremities:     (  ) no sensorimotor deficits     (  ) weakness     (  ) loss of proprioception/vibration     (  ) loss of touch/temperature (specify:    )    SKIN:   (  ) No rashes or lesions     (  ) maculopapular rash     (  ) pustules     (  ) vesicles     (  ) ulcer     (  ) ecchymosis     (specify location:     )    AMPAC score :    (  ) Indwelling Liang Catheter:   Date insterted:    Reason (  ) Critical illness     (  ) urinary retention    (  ) Accurate Ins/Outs Monitoring     (  ) CMO patient    (  ) Central Line:   Date inserted:  Location: (  ) Right IJ     (  ) Left IJ     (  ) Right Fem     (  ) Left Fem    (  ) SPC        (  ) pigtail       (  ) PEG tube       (  ) colostomy       (  ) jejunostomy  (  ) U-Dall    LABS:                        11.0   7.54  )-----------( 309      ( 06 Aug 2023 23:46 )             34.6     08-06    138  |  98  |  20  ----------------------------<  125<H>  5.0   |  28  |  0.9    Ca    9.6      06 Aug 2023 23:46    TPro  6.6  /  Alb  3.9  /  TBili  1.1  /  DBili  x   /  AST  28  /  ALT  25  /  AlkPhos  66  08-06      Urinalysis Basic - ( 06 Aug 2023 23:46 )    Color: x / Appearance: x / SG: x / pH: x  Gluc: 125 mg/dL / Ketone: x  / Bili: x / Urobili: x   Blood: x / Protein: x / Nitrite: x   Leuk Esterase: x / RBC: x / WBC x   Sq Epi: x / Non Sq Epi: x / Bacteria: x            CARDIAC MARKERS ( 06 Aug 2023 23:46 )  x     / <0.01 ng/mL / x     / x     / x          RADIOLOGY:    < from: VA Duplex Lower Ext Vein Scan, Bilat (08.06.23 @ 21:25) >  IMPRESSION:  No evidence of deep venous thrombosis in either lower extremity.    < end of copied text >  < from: Xray Foot AP + Lateral + Oblique, Right (08.06.23 @ 20:48) >  IMPRESSION:    Diffuse soft tissue swelling.    < end of copied text >  < from: Xray Foot AP + Lateral + Oblique, Right (08.06.23 @ 20:48) >  No radiographic evidence to suggest osteomyelitis.    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 Patient endorses onset of bilateral lower extremity swelling status post aortic valve fibroblastoma s/p removal. Pt noticed the development of a right foot blister that presented last week and has since opened and developed surrounding redness. Her physicians at [facility] prescribed topical abx ointment which did not improve her symptoms. Today her visiting nurse noted patient to have a fever of 101 and the pt made the decision to present to the ED.     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 PLEASE obtain records from [facility] and resume these meds if indicated.  - EKG NSR, nad, twi inf leads, slight st dep V4-V6   - Last TTE 8/2022: Normal global left ventricular systolic function.  - 8/2023: Echo showed EF 65-70%, G2 diastol dysfxn.   - c/w lasix 20mg PO (home dose)  - b/l LE venous duplex   - Pt follows with Cardiologist Dr King  - cardio does not recommend any further inpatient workup regarding t-wave inversions.    #Hyperlipidemia  - on Crestor at home; c/w Atorvastatin    #Misc  - DVT Prophylaxis: Lovenox  - GI Prophylaxis: Not Needed  - Diet: DASH  - Activity: IAT  - Code Status: Full      ---------------------------------------------------------------------------------------------------------------------------------  #Handoff  - pending debridement

## 2023-08-11 ENCOUNTER — TRANSCRIPTION ENCOUNTER (OUTPATIENT)
Age: 78
End: 2023-08-11

## 2023-08-11 LAB
MAGNESIUM SERPL-MCNC: 2.1 MG/DL — SIGNIFICANT CHANGE UP (ref 1.8–2.4)
NIGHT BLUE STAIN TISS: SIGNIFICANT CHANGE UP
SPECIMEN SOURCE: SIGNIFICANT CHANGE UP

## 2023-08-11 PROCEDURE — 99239 HOSP IP/OBS DSCHRG MGMT >30: CPT

## 2023-08-11 RX ORDER — ACETAMINOPHEN 500 MG
650 TABLET ORAL EVERY 6 HOURS
Refills: 0 | Status: DISCONTINUED | OUTPATIENT
Start: 2023-08-11 | End: 2023-08-12

## 2023-08-11 RX ORDER — ACETAMINOPHEN 500 MG
650 TABLET ORAL ONCE
Refills: 0 | Status: COMPLETED | OUTPATIENT
Start: 2023-08-11 | End: 2023-08-11

## 2023-08-11 RX ORDER — VANCOMYCIN HCL 1 G
1000 VIAL (EA) INTRAVENOUS EVERY 24 HOURS
Refills: 0 | Status: DISCONTINUED | OUTPATIENT
Start: 2023-08-11 | End: 2023-08-11

## 2023-08-11 RX ADMIN — Medication 650 MILLIGRAM(S): at 03:02

## 2023-08-11 RX ADMIN — Medication 650 MILLIGRAM(S): at 03:44

## 2023-08-11 RX ADMIN — Medication 650 MILLIGRAM(S): at 15:41

## 2023-08-11 RX ADMIN — AMPICILLIN SODIUM AND SULBACTAM SODIUM 200 GRAM(S): 250; 125 INJECTION, POWDER, FOR SUSPENSION INTRAMUSCULAR; INTRAVENOUS at 06:05

## 2023-08-11 RX ADMIN — Medication 250 MILLIGRAM(S): at 13:50

## 2023-08-11 RX ADMIN — Medication 20 MILLIGRAM(S): at 06:06

## 2023-08-11 RX ADMIN — ENOXAPARIN SODIUM 40 MILLIGRAM(S): 100 INJECTION SUBCUTANEOUS at 23:19

## 2023-08-11 RX ADMIN — Medication 650 MILLIGRAM(S): at 14:36

## 2023-08-11 NOTE — DISCHARGE NOTE PROVIDER - CARE PROVIDERS DIRECT ADDRESSES
,DirectAddress_Unknown,[email or web address]

## 2023-08-11 NOTE — DISCHARGE NOTE PROVIDER - CARE PROVIDER_API CALL
Madisyn Sierra  Internal Medicine  [address] [place] [address]  Phone: [phone]  Fax: [phone]  Follow Up Time: 2 weeks    Lyndon Lott  Podiatric Medicine and Surgery  [address] [place] [address]  Phone: [phone]  Fax: [phone]  Follow Up Time: 2 weeks

## 2023-08-11 NOTE — PROGRESS NOTE ADULT - NEUROLOGICAL
normal/cranial nerves II-XII intact/sensation intact
ROM/muscle strength/gait, locomotion, and balance/gross motor

## 2023-08-11 NOTE — DISCHARGE NOTE PROVIDER - HOSPITAL COURSE
Reason for Admission: Sepsis  History of Present Illness:   Ms. 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 Patient endorses onset of bilateral lower extremity swelling status post aortic valve fibroblastoma s/p removal. Pt noticed the development of a right foot blister that presented last week and has since opened and developed surrounding redness. Her physicians at [facility] prescribed topical abx ointment which did not improve her symptoms. Today her visiting nurse noted patient to have a fever of 101 and the pt made the decision to present to the ED. She denies headache, neck pain, chest pain, shortness of breath, cough, Abd pain, dysuria, hematuria, vomiting, diarrhea.    Vital Signs Last 12 Hrs  T(F): 100.2 (08-06-23 @ 18:48), Max: 100.2 (08-06-23 @ 18:48)  HR: 83 (08-06-23 @ 18:48) (83 - 83)  BP: 143/67 (08-06-23 @ 18:48) (143/67 - 143/67)  RR: 18 (08-06-23 @ 18:48) (18 - 18)  SpO2: 100% (08-06-23 @ 18:48) (100% - 100%)    Labs in the ED are significant for BNP 2302 and Hgb 11.0 (baseline 11.6). Chest x-ray was obtained in the ED pending read. A b/l LE venous duplex was ordered to r/o DVT and Right foot x-ray was ordered to r/o osteomyelitis.    Day 0: ID consulted, blood cultures were drawn, Echo showed EF 65-70%, G2 diastol dysfxn. Podiatry did not rec surg intervention.  Day 1: ID rec'd unasyn and vanc for R foot abscess.  Day 3: s/p foot debridement  Day 4: patient was transitioned to PO abx prior to discharge. 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 PLEASE obtain records from [facility] and resume these meds if indicated.  - EKG NSR, nad, twi inf leads, slight st dep V4-V6   - Last TTE 8/2022: Normal global left ventricular systolic function.  - 8/2023: Echo showed EF 65-70%, G2 diastol dysfxn.   - c/w lasix 20mg PO (home dose)  - b/l LE venous duplex   - Pt follows with Cardiologist Dr King  - cardio does not recommend any further inpatient workup regarding t-wave inversions.    #Hyperlipidemia  - on Crestor at home; c/w Atorvastatin     Reason for Admission: Sepsis  History of Present Illness:   Ms. 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 Patient endorses onset of bilateral lower extremity swelling status post aortic valve fibroblastoma s/p removal. Pt noticed the development of a right foot blister that presented last week and has since opened and developed surrounding redness. Her physicians at [facility] prescribed topical abx ointment which did not improve her symptoms. Today her visiting nurse noted patient to have a fever of 101 and the pt made the decision to present to the ED. She denies headache, neck pain, chest pain, shortness of breath, cough, Abd pain, dysuria, hematuria, vomiting, diarrhea.    Labs in the ED are significant for BNP 2302 and Hgb 11.0 (baseline 11.6). Chest x-ray was obtained in the ED pending read. A b/l LE venous duplex was ordered to r/o DVT and Right foot x-ray was ordered to r/o osteomyelitis.    Day 0: Foot X-ray did not show signs of osteomyelitis. ID consulted, blood cultures were drawn, Echo showed EF 65-70%, G2 diastol dysfxn. Podiatry did not rec surg intervention.  Day 1: ID rec'd unasyn and vanc for R foot abscess.  Day 3: s/p foot debridement  Day 4: patient was transitioned to PO Doxy 100 twice daily for 7 days on discharge. 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 PLEASE obtain records from [facility] and resume these meds if indicated.  - EKG NSR, nad, twi inf leads, slight st dep V4-V6   - Last TTE 8/2022: Normal global left ventricular systolic function.  - 8/2023: Echo showed EF 65-70%, G2 diastol dysfxn.   - c/w lasix 20mg PO (home dose)  - b/l LE venous duplex   - Pt follows with Cardiologist Dr King  - cardio does not recommend any further inpatient workup regarding t-wave inversions.    #Hyperlipidemia  - on Crestor at home; c/w Atorvastatin

## 2023-08-11 NOTE — DISCHARGE NOTE NURSING/CASE MANAGEMENT/SOCIAL WORK - PATIENT PORTAL LINK FT
You can access the FollowMyHealth Patient Portal offered by [facility] by registering at the following website: [email or web address]. By joining [email or web address] FollowMyHealth portal, you will also be able to view your health information using other applications (apps) compatible with our system.

## 2023-08-11 NOTE — PHARMACOTHERAPY INTERVENTION NOTE - COMMENTS
Review of discharge med rec. Patient to be discharged today per ID start on Doxycycline 100 mg po q12h for 7 more days. Patient was taking amiodarone 200 mg po daily prior to admission, when I spoke to patient she was unclear of indication. She has not received any amiodarone during this admission. Need to determine if patient is going home on amiodarone If not will need to remove from discharge med rec.

## 2023-08-11 NOTE — DISCHARGE NOTE PROVIDER - PROVIDER TOKENS
PROVIDER:[TOKEN:[[id number]],FOLLOWUP:[2 weeks]],PROVIDER:[TOKEN:[[id number]],FOLLOWUP:[2 weeks]]

## 2023-08-11 NOTE — DISCHARGE NOTE PROVIDER - NSDCCPCAREPLAN_GEN_ALL_CORE_FT
PRINCIPAL DISCHARGE DIAGNOSIS  Diagnosis: Cellulitis and abscess  Assessment and Plan of Treatment: You were found to have an infection with an accumulation under the skin known as an abscess and so you had surgical debridement by podiatry.   Take the prescribed antibiotic medicine you are given as directed until it is gone. Take it even if you feel better. It treats the infection and stops it from returning. Not taking all the medicine can make future infections hard to treat. Keep the infected area clean. When possible, raise the infected area above the level of your heart. This helps keep swelling down. Apply clean bandages as advised. Wash your hands often to prevent spreading the infection. In the future, wash your hands before and after you touch cuts, scratches, or bandages. This will help prevent infection.   Call your doctor or returnt o the emergency room or call 911 immediately if you have any of the following: Difficulty or pain when moving the joints above or below the infected area, Discharge or pus draining from the area, rever of 100.4°F (38°C) or higher, or as directed by your healthcare provider, pain that gets worse in or around the infected site, redness that gets worse in or around the infected area, particularly if the area of redness expands to a wider area, shaking chills, swelling of the infected area, vomiting.

## 2023-08-11 NOTE — PROGRESS NOTE ADULT - SUBJECTIVE AND OBJECTIVE BOX
KELSY ROYAL  78y Female    CHIEF COMPLAINT:    Patient is a 78y old  Female who presents with a chief complaint of Sepsis (11 Aug 2023 11:12)      INTERVAL HPI/OVERNIGHT EVENTS:    Patient seen and examined. S/P debridement of the L foot abscess yesterday. Denies any pain. No fever.     ROS: All other systems are negative.    Vital Signs:    T(F): 98.7 (23 @ 05:01), Max: 98.7 (23 @ 05:01)  HR: 95 (23 @ 05:01) (82 - 95)  BP: 107/59 (23 @ 05:01) (107/59 - 172/69)  RR: 17 (08-10-23 @ 20:40) (15 - 18)  SpO2: 97% (08-10-23 @ 20:40) (97% - 99%)  I&O's Summary    10 Aug 2023 07:  -  11 Aug 2023 07:00  --------------------------------------------------------  IN: 0 mL / OUT: 1450 mL / NET: -1450 mL    11 Aug 2023 07:  -  11 Aug 2023 11:53  --------------------------------------------------------  IN: 566 mL / OUT: 1150 mL / NET: -584 mL      Daily Height in cm: 160 (10 Aug 2023 18:59)    Daily Weight in k (11 Aug 2023 05:01)  CAPILLARY BLOOD GLUCOSE          PHYSICAL EXAM:    GENERAL:  NAD  SKIN: No rashes or lesions  HENT: Atraumatic. Normocephalic. PERRL. Moist membranes.  NECK: Supple, No JVD. No lymphadenopathy.  PULMONARY: CTA B/L. No wheezing. No rales  CVS: Normal S1, S2. Rate and Rhythm are regular. No murmurs.  ABDOMEN/GI: Soft, Nontender, Nondistended; BS present  EXTREMITIES: Peripheral pulses intact. No edema B/L LE. Dressing on L foot  NEUROLOGIC:  No motor or sensory deficit.  PSYCH: Alert & oriented x 3    Consultant(s) Notes Reviewed:  [x ] YES  [ ] NO  Care Discussed with Consultants/Other Providers [ x] YES  [ ] NO    EKG reviewed  Telemetry reviewed    LABS:                        11.3   7.48  )-----------( 347      ( 09 Aug 2023 23:38 )             35.1     08    143  |  100  |  27<H>  ----------------------------<  58<L>  4.2   |  26  |  1.0    Ca    8.9      09 Aug 2023 23:38  Mg     2.1     08-    TPro  6.2  /  Alb  3.4<L>  /  TBili  0.4  /  DBili  x   /  AST  15  /  ALT  15  /  AlkPhos  64  08-09    PT/INR - ( 09 Aug 2023 23:38 )   PT: 13.60 sec;   INR: 1.19 ratio         PTT - ( 09 Aug 2023 23:38 )  PTT:29.1 sec        Culture - Blood (collected 08 Aug 2023 19:22)  Source: .Blood Blood-Peripheral  Preliminary Report (11 Aug 2023 01:03):    No growth at 48 Hours        RADIOLOGY & ADDITIONAL TESTS:      Imaging or report Personally Reviewed:  [ ] YES  [ ] NO    Medications:  Standing  ampicillin/sulbactam  IVPB 3 Gram(s) IV Intermittent every 6 hours  enoxaparin Injectable 40 milliGRAM(s) SubCutaneous every 24 hours  furosemide    Tablet 20 milliGRAM(s) Oral daily  vancomycin  IVPB 1000 milliGRAM(s) IV Intermittent every 24 hours    PRN Meds      Case discussed with resident    Care discussed with pt/family

## 2023-08-11 NOTE — PROGRESS NOTE ADULT - SUBJECTIVE AND OBJECTIVE BOX
KELSY ROYAL  78y, Female    All available historical data reviewed    OVERNIGHT EVENTS:    feels well and has no new complaints  No fevers  s/p debridement  ROS:  General: Denies rigors, nightsweats  HEENT: Denies headache, rhinorrhea, sore throat, eye pain  CV: Denies CP, palpitations  PULM: Denies wheezing, hemoptysis  GI: Denies hematemesis, hematochezia, melena  : Denies discharge, hematuria  MSK: Denies arthralgias, myalgias  SKIN: Denies rash, lesions  NEURO: Denies paresthesias, weakness  PSYCH: Denies depression, anxiety    VITALS:  T(F): 98.7, Max: 98.7 (08-11-23 @ 05:01)  HR: 95  BP: 107/59  RR: 17Vital Signs Last 24 Hrs  T(C): 37.1 (11 Aug 2023 05:01), Max: 37.1 (11 Aug 2023 05:01)  T(F): 98.7 (11 Aug 2023 05:01), Max: 98.7 (11 Aug 2023 05:01)  HR: 95 (11 Aug 2023 05:01) (82 - 95)  BP: 107/59 (11 Aug 2023 05:01) (107/59 - 172/69)  BP(mean): 69 (10 Aug 2023 18:22) (69 - 69)  RR: 17 (10 Aug 2023 20:40) (15 - 18)  SpO2: 97% (10 Aug 2023 20:40) (97% - 99%)    Parameters below as of 10 Aug 2023 20:35  Patient On (Oxygen Delivery Method): room air        TESTS & MEASUREMENTS:                        11.3   7.48  )-----------( 347      ( 09 Aug 2023 23:38 )             35.1     08-09    143  |  100  |  27<H>  ----------------------------<  58<L>  4.2   |  26  |  1.0    Ca    8.9      09 Aug 2023 23:38  Mg     2.1     08-11    TPro  6.2  /  Alb  3.4<L>  /  TBili  0.4  /  DBili  x   /  AST  15  /  ALT  15  /  AlkPhos  64  08-09    LIVER FUNCTIONS - ( 09 Aug 2023 23:38 )  Alb: 3.4 g/dL / Pro: 6.2 g/dL / ALK PHOS: 64 U/L / ALT: 15 U/L / AST: 15 U/L / GGT: x             Culture - Blood (collected 08-08-23 @ 19:22)  Source: .Blood Blood-Peripheral  Preliminary Report (08-11-23 @ 01:03):    No growth at 48 Hours    Culture - Blood (collected 08-07-23 @ 00:40)  Source: .Blood Blood  Preliminary Report (08-11-23 @ 09:00):    No growth at 4 days    Culture - Blood (collected 08-07-23 @ 00:40)  Source: .Blood Blood  Preliminary Report (08-11-23 @ 09:00):    No growth at 4 days      Urinalysis Basic - ( 09 Aug 2023 23:38 )    Color: x / Appearance: x / SG: x / pH: x  Gluc: 58 mg/dL / Ketone: x  / Bili: x / Urobili: x   Blood: x / Protein: x / Nitrite: x   Leuk Esterase: x / RBC: x / WBC x   Sq Epi: x / Non Sq Epi: x / Bacteria: x          RADIOLOGY & ADDITIONAL TESTS:  Personal review of radiological diagnostics performed  Echo and EKG results noted when applicable.     MEDICATIONS:  ampicillin/sulbactam  IVPB 3 Gram(s) IV Intermittent every 6 hours  enoxaparin Injectable 40 milliGRAM(s) SubCutaneous every 24 hours  furosemide    Tablet 20 milliGRAM(s) Oral daily  vancomycin  IVPB 1000 milliGRAM(s) IV Intermittent every 24 hours      ANTIBIOTICS:  ampicillin/sulbactam  IVPB 3 Gram(s) IV Intermittent every 6 hours  vancomycin  IVPB 1000 milliGRAM(s) IV Intermittent every 24 hours

## 2023-08-11 NOTE — PROGRESS NOTE ADULT - SUBJECTIVE AND OBJECTIVE BOX
Podiatry Progress Note    Subjective:  KELSY ROYAL is a  78y Female.   Seen bedside.   Patient is a 78y old  Female who presents with a chief complaint of Sepsis (10 Aug 2023 12:03)      Past Medical History and Surgical History  PAST MEDICAL & SURGICAL HISTORY:  HTN (hypertension)      Hayfever      Breast cancer      Nonrheumatic mitral valve insufficiency      Papillary fibroelastoma of heart      H/O total hip arthroplasty, left      H/O bilateral mastectomy  BILAT LYMPHEDEMA      History of total hip replacement, bilateral           Objective:  Vital Signs Last 24 Hrs  T(C): 37.1 (11 Aug 2023 05:01), Max: 37.1 (11 Aug 2023 05:01)  T(F): 98.7 (11 Aug 2023 05:01), Max: 98.7 (11 Aug 2023 05:01)  HR: 95 (11 Aug 2023 05:01) (82 - 95)  BP: 107/59 (11 Aug 2023 05:01) (107/59 - 172/69)  BP(mean): 69 (10 Aug 2023 18:22) (69 - 69)  RR: 17 (10 Aug 2023 20:40) (15 - 18)  SpO2: 97% (10 Aug 2023 20:40) (97% - 99%)    Parameters below as of 10 Aug 2023 20:35  Patient On (Oxygen Delivery Method): room air                            11.3   7.48  )-----------( 347      ( 09 Aug 2023 23:38 )             35.1                 08-09    143  |  100  |  27<H>  ----------------------------<  58<L>  4.2   |  26  |  1.0    Ca    8.9      09 Aug 2023 23:38  Mg     2.1     08-11    TPro  6.2  /  Alb  3.4<L>  /  TBili  0.4  /  DBili  x   /  AST  15  /  ALT  15  /  AlkPhos  64  08-09        Physical Exam - Lower Extremity Focused:   Derm:   Surgical site to the dorsal right lower extremity, erythema resolved skin well coapted with nylon suture. no drainage.   Vascular: DP and PT Pulses palpable; Foot is Warm to Warm to the touch; Capillary Refill Time < 3 Seconds;    Neuro: Protective Sensation intact  MSK: Moderate Pain On Palpation at Wound Site     Assessment:  Cellulitic Right Lower Extremity   s/p incision and drainage R foot 8/10.   Plan:  Chart reviewed and Patient evaluated. All Questions and Concerns Addressed and Answered  Local Wound Care; Wound Flushed w/ NS; Wound Packed w/xeroform/ Gauze / DSD / Kerlix   Weight Bearing Status; WBAT w/ Heel Touch w/ Surgical Shoe;   Continue w/ Local Wound Care; Q24 Dressing Changes;  Req VNS  No Further podiatric surgical intervention indicated at this time.   Patient Stable Per Podiatry Standpoint; Follow Up as an Outpatient w/ Dr. Lott; at [facility].   Discussed Plan w/ Attending;     Podiatry

## 2023-08-11 NOTE — PROGRESS NOTE ADULT - GASTROINTESTINAL
normal/soft/nontender/nondistended/normal active bowel sounds
normal/soft/nontender/nondistended/normal active bowel sounds
soft/nontender/no guarding/no rigidity

## 2023-08-11 NOTE — DISCHARGE NOTE PROVIDER - NSDCMRMEDTOKEN_GEN_ALL_CORE_FT
amiodarone 200 mg oral tablet: 1 tab(s) orally once a day  atenolol 25 mg oral tablet: 1 tab(s) orally once a day  Crestor 5 mg oral tablet: 1 tab(s) orally once a day  Lasix 20 mg oral tablet: 1 tab(s) orally once a day   amiodarone 200 mg oral tablet: 1 tab(s) orally once a day  atenolol 25 mg oral tablet: 1 tab(s) orally once a day  Crestor 5 mg oral tablet: 1 tab(s) orally once a day  doxycycline hyclate 100 mg oral capsule: 1 cap(s) orally 2 times a day  Lasix 20 mg oral tablet: 1 tab(s) orally once a day

## 2023-08-12 VITALS
HEART RATE: 75 BPM | DIASTOLIC BLOOD PRESSURE: 53 MMHG | RESPIRATION RATE: 18 BRPM | SYSTOLIC BLOOD PRESSURE: 115 MMHG | OXYGEN SATURATION: 99 % | TEMPERATURE: 98 F

## 2023-08-12 LAB
CULTURE RESULTS: SIGNIFICANT CHANGE UP
CULTURE RESULTS: SIGNIFICANT CHANGE UP
SPECIMEN SOURCE: SIGNIFICANT CHANGE UP
SPECIMEN SOURCE: SIGNIFICANT CHANGE UP

## 2023-08-12 PROCEDURE — 99239 HOSP IP/OBS DSCHRG MGMT >30: CPT

## 2023-08-12 RX ADMIN — Medication 20 MILLIGRAM(S): at 05:45

## 2023-08-12 RX ADMIN — Medication 650 MILLIGRAM(S): at 06:17

## 2023-08-12 RX ADMIN — Medication 100 MILLIGRAM(S): at 05:45

## 2023-08-12 RX ADMIN — Medication 650 MILLIGRAM(S): at 05:44

## 2023-08-12 NOTE — PROGRESS NOTE ADULT - SUBJECTIVE AND OBJECTIVE BOX
KELSY ROYAL  78y Female    INTERVAL HPI/OVERNIGHT EVENTS:    pt feels well - no complaints  no fever  comfortable in going home today    T(F): 98.2 (08-12-23 @ 09:02), Max: 98.6 (08-11-23 @ 20:10)  HR: 75 (08-12-23 @ 09:02) (75 - 88)  BP: 115/53 (08-12-23 @ 09:02) (113/56 - 117/56)  RR: 18 (08-12-23 @ 09:02) (17 - 18)  SpO2: 99% (08-12-23 @ 09:02) (99% - 99%)  I&O's Summary    11 Aug 2023 07:01  -  12 Aug 2023 07:00  --------------------------------------------------------  IN: 1126 mL / OUT: 1150 mL / NET: -24 mL    PHYSICAL EXAM:  GENERAL: NAD  HEAD:  Normocephalic  EYES:  conjunctiva and sclera clear  ENMT: Moist mucous membranes  NERVOUS SYSTEM:  Alert, awake, Good concentration  CHEST/LUNG: CTA b/l  HEART: Regular rate and rhythm  ABDOMEN: Soft, Nontender, Nondistended  EXTREMITIES/SKIN: right foot with dressing and surgical shoe    Consultant(s) Notes Reviewed:  [x ] YES  [ ] NO  Care Discussed with Consultants/Other Providers [ x] YES  [ ] NO    MEDICATIONS  (STANDING):  doxycycline IVPB 100 milliGRAM(s) IV Intermittent every 12 hours  doxycycline monohydrate Capsule 100 milliGRAM(s) Oral every 12 hours  enoxaparin Injectable 40 milliGRAM(s) SubCutaneous every 24 hours  furosemide    Tablet 20 milliGRAM(s) Oral daily    MEDICATIONS  (PRN):  acetaminophen     Tablet .. 650 milliGRAM(s) Oral every 6 hours PRN Temp greater or equal to 38C (100.4F), Mild Pain (1 - 3)      LABS:      Mg     2.1     08-11          Culture - Surgical Swab (collected 10 Aug 2023 20:00)  Source: .Surgical Swab None  Preliminary Report (12 Aug 2023 10:52):    Rare Escherichia coli    Culture - Acid Fast - Other w/Smear (collected 10 Aug 2023 20:00)  Source: .Other None        RADIOLOGY & ADDITIONAL TESTS:    Imaging or report Personally Reviewed:  [ x] YES  [ ] NO    < from: VA Duplex Lower Ext Vein Scan, Bilat (08.06.23 @ 21:25) >  IMPRESSION:  No evidence of deep venous thrombosis in either lower extremity.    < end of copied text >  < from: Xray Foot AP + Lateral + Oblique, Right (08.06.23 @ 20:48) >  IMPRESSION:    Diffuse soft tissue swelling.    < end of copied text >      < from: TTE Echo Complete w/o Contrast w/ Doppler (08.07.23 @ 08:25) >    Summary:   1. Left ventricular ejection fraction, by visual estimation, is 65 to   70%.   2. Normal global left ventricular systolic function.   3. Spectral Doppler shows pseudonormal pattern of left ventricular   myocardial filling (Grade II diastolic dysfunction).   4. Mildly enlarged left atrium.   5. Mildly enlarged right atrium.   6. Mildly calcified trileaflet aortic valve with normal opening.   7. Mild aortic regurgitation.   8. Mild thickening and calcification of the anterior and posterior   mitral valve leaflets.   9. Mild posterior mitral leaflet prolapse.  10. Mild mitral regurgitation.  11. Mild-moderate tricuspid regurgitation.    < end of copied text >      Case discussed with case management today    Care discussed with pt

## 2023-08-12 NOTE — PROGRESS NOTE ADULT - PROVIDER SPECIALTY LIST ADULT
Hospitalist
Hospitalist
Infectious Disease
Internal Medicine
Podiatry
Podiatry
Hospitalist
Internal Medicine
Internal Medicine
Hospitalist
Infectious Disease
Infectious Disease

## 2023-08-12 NOTE — PROGRESS NOTE ADULT - ASSESSMENT
Ms. 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 Pt noticed the development of a right foot blister that presented last week and has since opened and developed surrounding redness.    Right forefoot with subcutaneous abscess with surrounding cellulitis  Abnormal EKG  S/P removal of AV fibroelastoma on 7/12/23  HTN / DL  H/P Breast Ca s/p b/l mastectomy                 PLAN:    ·	Cont IV Unasyn and IV Vancomycin  ·	Blood cx is negative  ·	Scheduled for local debridement with tissue cultures  ·	R foot x-ry showed soft tissue edema.   ·	Podiatry eval noted. Plan for Incision and drainage with deep wound cultures of right foot today at 3PM  ·	Cont NPO for now  ·	WBAT  ·	Cont Lasix and Lipitor  ·	Cardiology eval noted. No further cardiac w/u. Out pt f/u with her cardiologist.     Progress Note Handoff    Pending (specify):  Consults_________, Tests________, Test Results_______, Other__Debridement today_______  Family discussion:  Disposition: Home___/SNF___/Other________/Unknown at this time________    Noam Bear MD  Spectra: [phone]        
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 Pt noticed the development of a right foot blister that appeared last week and has since opened and developed surrounding redness.    Right forefoot with subcutaneous abscess with surrounding cellulitis  Abnormal EKG - T wave inversions in inferolateral leads  S/P removal of AV fibroelastoma on 7/12/23  HTN - controlled  H/O Breast Ca s/p b/l mastectomy                 PLAN:    ·	S/P I&D on 8/10  ·	Podiatry f/u noted. Recommended local wound care q24hr. WBAT w/ Heel Touch w/ Surgical Shoe  ·	Home care arranged  ·	Blood cultures negative  ·	wound culture with rare E. coli - f/u final and sensitivities  ·	ID f/u noted. Recommended On d/c Doxycycline 100 mg po q 12h for one week  ·	Cardiology eval noted. No further cardiac w/u. Out pt f/u with her cardiologist Dr. King  ·	pt ambulated with RW with PT      med reconciliation and discharge papers reviewed by me  spent 40 minutes on the discharge process of this pt including chart review            
Ms. 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 Pt noticed the development of a right foot blister that presented last week and has since opened and developed surrounding redness.    Right forefoot with subcutaneous abscess with surrounding cellulitis  Abnormal EKG  S/P removal of AV fibroelastoma on 7/12/23  HTN / DL  H/P Breast Ca s/p b/l mastectomy                 PLAN:    ·	S/P L foot abscess debridement yesterday.   ·	Podiatry f/u noted. Recommended local wound care. WBAT w/ Heel Touch w/ Surgical Shoe  ·	Blood cx is negative  ·	ID f/u noted. Recommended d/c IV Abx. On d/c Doxycycline 100 mg po q 12h for one week  ·	R foot x-ry showed soft tissue edema.   ·	Cardiology eval noted. No further cardiac w/u. Out pt f/u with her cardiologist.   ·	Cont her home meds on d/c  ·	PT eval    * Med rec reviewed. Plan of care d/w the pt. Time spent 37 minutes.     Progress Note Handoff    Pending (specify):  Consults_________, Tests________, Test Results_______, Other_Social services for d/c planning________  Family discussion:  Disposition: Home___/SNF___/Other________/Unknown at this time________    Noam Bear MD  Spectra: [phone]            
Ms. 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 Pt noticed the development of a right foot blister that presented last week and has since opened and developed surrounding redness.    Right forefoot with subcutaneous abscess with surrounding cellulitis  Abnormal EKG  S/P removal of AV fibroelastoma on 7/12/23  HTN / DL  H/P Breast Ca s/p b/l mastectomy                 PLAN:    ·	ID eval noted. Recommended IV Unasyn and IV Vancomycin  ·	Blood cx is negative  ·	ID also recommended local debridement with tissue cultures  ·	R foot x-ry showed soft tissue edema.   ·	Podiatry eval noted. Plan for OR Thursday 08/10/23 @ 3pm; Incision and drainage with deep wound cultures of right foot.  ·	NPO post MN   ·	WBAT  ·	Cont Lasix and Lipitor  ·	Cardiology eval for abnormal EKG and medical clearance for the procedure    Progress Note Handoff    Pending (specify):  Consults__Cardiology_______, Tests________, Test Results_______, Other__Debridement in AM_______  Family discussion:  Disposition: Home___/SNF___/Other________/Unknown at this time________    Noam Bear MD  Spectra: [phone]        
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 Patient endorses onset of bilateral lower extremity swelling status post aortic valve fibroblastoma s/p removal. Pt noticed the development of a right foot blister that presented last week and has since opened and developed surrounding redness. Her physicians at [facility] prescribed topical abx ointment which did not improve her symptoms. Today her visiting nurse noted patient to have a fever of 101.    IMPRESSION/RECOMMENDATIONS  Right forefoot with resolved subcutaneous abscess with resolving surrounding cellulitis  8/11 s/p debridement  8/7,8  BCx NG  WBC 7.5  -po Doxycycline 100 mg q12h for 7 more days    Please do not hesitate to recall ID if any questions arise either through [email or web address] or through [employer] teams 
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 Patient endorses onset of bilateral lower extremity swelling status post aortic valve fibroblastoma s/p removal. Pt noticed the development of a right foot blister that presented last week and has since opened and developed surrounding redness. Her physicians at [facility] prescribed topical abx ointment which did not improve her symptoms. Today her visiting nurse noted patient to have a fever of 101.    IMPRESSION/RECOMMENDATIONS  Right forefoot with subcutaneous abscess with surrounding cellulitis  8/7 BCx NG  WBC 7.5  -local debridement with tissue cultures > planned for 8/10  -vancomycin 1 gm iv q12h  -Unasyn 3 gm iv q6h
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, mitral regurgitation, tricuspid regurgitation, pulmonary hypertension, hypertension, hyperlipidemia, Breast Ca s/p b/l mastectomy causing b/l UE lymphedema who presented to the ED for evaluation her Right LE. Patient endorses onset of bilateral lower extremity swelling status post aortic valve fibroblastoma s/p removal. Pt noticed the development of a right foot blister that presented last week and has since opened and developed surrounding redness. Her physicians at [facility] prescribed topical abx ointment which did not improve her symptoms. Today her visiting nurse noted patient to have a fever of 101.    IMPRESSION/RECOMMENDATIONS  Right forefoot with subcutaneous abscess with surrounding cellulitis  8/7 BCx NG  WBC 7.5  -local debridement with tissue cultures  -vancomycin 1 gm iv q12h  -Unasyn 3 gm iv q6h

## 2023-08-13 ENCOUNTER — TRANSCRIPTION ENCOUNTER (OUTPATIENT)
Age: 78
End: 2023-08-13

## 2023-08-13 LAB
-  AMIKACIN: SIGNIFICANT CHANGE UP
-  AMOXICILLIN/CLAVULANIC ACID: SIGNIFICANT CHANGE UP
-  AMPICILLIN/SULBACTAM: SIGNIFICANT CHANGE UP
-  AMPICILLIN: SIGNIFICANT CHANGE UP
-  AZTREONAM: SIGNIFICANT CHANGE UP
-  CEFAZOLIN: SIGNIFICANT CHANGE UP
-  CEFEPIME: SIGNIFICANT CHANGE UP
-  CEFOXITIN: SIGNIFICANT CHANGE UP
-  CEFTRIAXONE: SIGNIFICANT CHANGE UP
-  CIPROFLOXACIN: SIGNIFICANT CHANGE UP
-  ERTAPENEM: SIGNIFICANT CHANGE UP
-  GENTAMICIN: SIGNIFICANT CHANGE UP
-  IMIPENEM: SIGNIFICANT CHANGE UP
-  LEVOFLOXACIN: SIGNIFICANT CHANGE UP
-  MEROPENEM: SIGNIFICANT CHANGE UP
-  PIPERACILLIN/TAZOBACTAM: SIGNIFICANT CHANGE UP
-  TOBRAMYCIN: SIGNIFICANT CHANGE UP
-  TRIMETHOPRIM/SULFAMETHOXAZOLE: SIGNIFICANT CHANGE UP
METHOD TYPE: SIGNIFICANT CHANGE UP

## 2023-08-14 ENCOUNTER — APPOINTMENT (OUTPATIENT)
Age: 78
End: 2023-08-14
Payer: MEDICARE

## 2023-08-14 ENCOUNTER — TRANSCRIPTION ENCOUNTER (OUTPATIENT)
Age: 78
End: 2023-08-14

## 2023-08-14 VITALS
HEART RATE: 68 BPM | RESPIRATION RATE: 18 BRPM | DIASTOLIC BLOOD PRESSURE: 60 MMHG | OXYGEN SATURATION: 92 % | SYSTOLIC BLOOD PRESSURE: 100 MMHG

## 2023-08-14 LAB
-  AMIKACIN: SIGNIFICANT CHANGE UP
-  AMOXICILLIN/CLAVULANIC ACID: SIGNIFICANT CHANGE UP
-  AMPICILLIN/SULBACTAM: SIGNIFICANT CHANGE UP
-  AMPICILLIN: SIGNIFICANT CHANGE UP
-  AZTREONAM: SIGNIFICANT CHANGE UP
-  CEFAZOLIN: SIGNIFICANT CHANGE UP
-  CEFEPIME: SIGNIFICANT CHANGE UP
-  CEFOXITIN: SIGNIFICANT CHANGE UP
-  CEFTRIAXONE: SIGNIFICANT CHANGE UP
-  CIPROFLOXACIN: SIGNIFICANT CHANGE UP
-  ERTAPENEM: SIGNIFICANT CHANGE UP
-  GENTAMICIN: SIGNIFICANT CHANGE UP
-  LEVOFLOXACIN: SIGNIFICANT CHANGE UP
-  MEROPENEM: SIGNIFICANT CHANGE UP
-  PIPERACILLIN/TAZOBACTAM: SIGNIFICANT CHANGE UP
-  TOBRAMYCIN: SIGNIFICANT CHANGE UP
-  TRIMETHOPRIM/SULFAMETHOXAZOLE: SIGNIFICANT CHANGE UP
CULTURE RESULTS: SIGNIFICANT CHANGE UP
METHOD TYPE: SIGNIFICANT CHANGE UP
SPECIMEN SOURCE: SIGNIFICANT CHANGE UP

## 2023-08-14 PROCEDURE — 99349 HOME/RES VST EST MOD MDM 40: CPT

## 2023-08-14 RX ORDER — ASPIRIN 81 MG
81 TABLET, DELAYED RELEASE (ENTERIC COATED) ORAL
Refills: 0 | Status: COMPLETED | COMMUNITY
End: 2023-08-14

## 2023-08-14 NOTE — PHYSICAL EXAM
[No Acute Distress] : no acute distress [Well Developed] : well developed [Well-Appearing] : well-appearing [Normal Sclera/Conjunctiva] : normal sclera/conjunctiva [Normal Outer Ear/Nose] : the outer ears and nose were normal in appearance [No Respiratory Distress] : no respiratory distress  [Clear to Auscultation] : lungs were clear to auscultation bilaterally [Normal Rate] : normal rate  [Regular Rhythm] : with a regular rhythm [Soft] : abdomen soft [Non Tender] : non-tender [Normal Affect] : the affect was normal [Alert and Oriented x3] : oriented to person, place, and time [Normal Mood] : the mood was normal [de-identified] : left LE trace edema, right LE + 1 edema [de-identified] : walker [de-identified] : anish tinajero dsg c/d/i. right axilla incision c/d/i

## 2023-08-14 NOTE — ASSESSMENT
[FreeTextEntry1] : Patient is a 78 year old female enrolled in the [facility] TCM program s/p a recent discharge from [facility] 8/7-8/12 for cellulitis/abscess. 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 Patient had I/D of right foot, started on abx and sent home with HCS. HCS in place. Upon arrival patient alert in nad, denies cp, sob, edema, fever, chills, n/v/, cough. Reports fatigue. F/U appointments with podiatry 8/22 and cardio 8/24. Patient was contacted by Johanna Connolly RN from [facility] and medication reconciliation was done with in 48 hours of discharge 8/14.

## 2023-08-14 NOTE — PLAN
[FreeTextEntry1] : HLD:c/w statin. f/u pcp.  Cellulitis:c/w abx and dsg changes by wound care specialist. monitor for s/s infection. f/u podiatry.  Papillary fibroelastoma: monitor incisions. f/u surgery team.

## 2023-08-14 NOTE — REVIEW OF SYSTEMS
[Fatigue] : fatigue [Negative] : Psychiatric [FreeTextEntry9] : using walker [de-identified] : incision right axilla and right foot c/d/i

## 2023-08-14 NOTE — HISTORY OF PRESENT ILLNESS
[Spouse] : spouse [FreeTextEntry1] : F/U hospital discharge for cellulitis/abscess of right foot. [de-identified] : Patient is a 78 year old female enrolled in the [facility] TCM program s/p a recent discharge from [facility] 8/7-8/12 for cellulitis/abscess. 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, mitral regurgitation, tricuspid regurgitation, pulmonary hypertension, hypertension, hyperlipidemia, Breast Ca s/p b/l mastectomy causing b/l UE lymphedema. Patient had I/D of right foot, started on abx and sent home with HCS. HCS in place. Upon arrival patient alert in nad, denies cp, sob, edema, fever, chills, n/v/, cough. Reports fatigue. F/U appointments with podiatry 8/22 and cardio 8/24. Patient was contacted by Johanna Connolly RN from [facility] and medication reconciliation was done with in 48 hours of discharge 8/14.  Copied from SCM: Hospital Course: Reason for Admission: Sepsis History of Present Illness:  Ms. 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, mitral regurgitation, tricuspid regurgitation, pulmonary hypertension, hypertension, hyperlipidemia, Breast Ca s/p b/l mastectomy causing b/l UE lymphedema who presented to the ED for evaluation her Right LE. Patient endorses onset of bilateral lower extremity swelling status post aortic valve fibroblastoma s/p removal. Pt noticed the development of a right foot blister that presented last week and has since opened and developed surrounding redness. Her physicians at [facility] prescribed topical abx ointment which did not improve her symptoms. Today her visiting nurse noted patient to have a fever of 101 and the pt made the decision to present to the ED. She denies headache, neck pain, chest pain, shortness of breath, cough, Abd pain, dysuria, hematuria, vomiting, diarrhea. Labs in the ED are significant for BNP 2302 and Hgb 11.0 (baseline 11.6). Chest x-ray was obtained in the ED pending read. A b/l LE venous duplex was ordered to r/o DVT and Right foot x-ray was ordered to r/o osteomyelitis. Day 0: Foot X-ray did not show signs of osteomyelitis. ID consulted, blood cultures were drawn, Echo showed EF 65-70%, G2 diastol dysfxn. Podiatry did not rec surg intervention. Day 1: ID rec'd unasyn and vanc for R foot abscess. Day 3: s/p foot debridement Day 4: patient was transitioned to PO Doxy 100 twice daily for 7 days on discharge. 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 PLEASE obtain records from [facility] and resume these meds if indicated. - EKG NSR, nad, twi inf leads, slight st dep V4-V6  - Last TTE 8/2022: Normal global left ventricular systolic function. - 8/2023: Echo showed EF 65-70%, G2 diastol dysfxn.  - c/w lasix 20mg PO (home dose) - b/l LE venous duplex  - Pt follows with Cardiologist Dr King - cardio does not recommend any further inpatient workup regarding t-wave inversions. #Hyperlipidemia - on Crestor at home; c/w Atorvastatin

## 2023-08-14 NOTE — COUNSELING
[de-identified] : Complete ABT Adhere to all medications. Increase activity as tolerated and maintain optimal activity levels. Continue coughing and deep breathing exercises including use of Incentive Spirometry. Receive routine pneumococcal and influenza vaccinations. Maintain proper nutrition and adequate hydration. Notify NP for worsening symptoms including fever, chills, SOB, CP, increased cough and secretions. Follow up with MD within 7 days of discharge.

## 2023-08-15 LAB — SURGICAL PATHOLOGY STUDY: SIGNIFICANT CHANGE UP

## 2023-08-16 LAB
CULTURE RESULTS: SIGNIFICANT CHANGE UP
ORGANISM # SPEC MICROSCOPIC CNT: SIGNIFICANT CHANGE UP
SPECIMEN SOURCE: SIGNIFICANT CHANGE UP

## 2023-08-16 RX ORDER — CEFPODOXIME PROXETIL 100 MG
1 TABLET ORAL
Qty: 14 | Refills: 0
Start: 2023-08-16 | End: 2023-08-22

## 2023-08-16 RX ORDER — CEFPODOXIME PROXETIL 100 MG
5 TABLET ORAL
Qty: 1 | Refills: 0
Start: 2023-08-16 | End: 2023-08-22

## 2023-08-16 NOTE — CHART NOTE - NSCHARTNOTEFT_GEN_A_CORE
Abscess culture results reviewed and case discussed with ID today  Script sent for vantin 100mg q12hr x 7 days  informed pt of the change in abx  she states that the wound is healing well (has visiting nurse) and she will see the doctor next week.    Culture - Surgical Swab (08.10.23 @ 20:00)   - Amikacin: S <=16  - Amikacin: S <=16  - Amoxicillin/Clavulanic Acid: R >16/8  - Amoxicillin/Clavulanic Acid: R >16/8  - Ampicillin: R >16 These ampicillin results predict results for amoxicillin  - Ampicillin: R 16 These ampicillin results predict results for amoxicillin  - Ampicillin/Sulbactam: R 16/8 Enterobacter, Klebsiella aerogenes, Citrobacter, and Serratia may develop resistance during prolonged therapy (3-4 days)  - Ampicillin/Sulbactam: I 16/8 Enterobacter, Klebsiella aerogenes, Citrobacter, and Serratia may develop resistance during prolonged therapy (3-4 days)  - Aztreonam: S <=4  - Aztreonam: S <=4  - Cefazolin: R >16 Enterobacter, Klebsiella aerogenes, Citrobacter, and Serratia may develop resistance during prolonged therapy (3-4 days)  - Cefazolin: R >16 Enterobacter, Klebsiella aerogenes, Citrobacter, and Serratia may develop resistance during prolonged therapy (3-4 days)  - Cefepime: S <=2  - Cefepime: S <=2  - Cefoxitin: R >16  - Cefoxitin: S <=8  - Ceftriaxone: S <=1 Enterobacter, Klebsiella aerogenes, Citrobacter, and Serratia may develop resistance during prolonged therapy  - Ceftriaxone: S <=1 Enterobacter, Klebsiella aerogenes, Citrobacter, and Serratia may develop resistance during prolonged therapy  - Ciprofloxacin: S <=0.25  - Ciprofloxacin: S <=0.25  - Ertapenem: S <=0.5  - Ertapenem: S <=0.5  - Gentamicin: S <=2  - Gentamicin: S <=2  - Imipenem: S <=1  - Levofloxacin: S <=0.5  - Levofloxacin: S <=0.5  - Meropenem: S <=1  - Meropenem: S <=1  - Piperacillin/Tazobactam: S <=8  - Piperacillin/Tazobactam: S <=8  - Tobramycin: S 4  - Tobramycin: S 4  - Trimethoprim/Sulfamethoxazole: S <=0.5/9.5  - Trimethoprim/Sulfamethoxazole: S <=0.5/9.5  Specimen Source: .Surgical Swab None  Culture Results:   Rare Escherichia coli   Rare Serratia marcescens   Rare Staphylococcus hominis "Susceptibilities not performed"  Organism Identification: Escherichia coli   Serratia marcescens  Organism: Escherichia coli  Organism: Serratia marcescens  Method Type: YARA  Method Type: YARA

## 2023-08-17 DIAGNOSIS — I08.1 RHEUMATIC DISORDERS OF BOTH MITRAL AND TRICUSPID VALVES: ICD-10-CM

## 2023-08-17 DIAGNOSIS — E78.5 HYPERLIPIDEMIA, UNSPECIFIED: ICD-10-CM

## 2023-08-17 DIAGNOSIS — B96.89 OTHER SPECIFIED BACTERIAL AGENTS AS THE CAUSE OF DISEASES CLASSIFIED ELSEWHERE: ICD-10-CM

## 2023-08-17 DIAGNOSIS — I27.20 PULMONARY HYPERTENSION, UNSPECIFIED: ICD-10-CM

## 2023-08-17 DIAGNOSIS — Z79.52 LONG TERM (CURRENT) USE OF SYSTEMIC STEROIDS: ICD-10-CM

## 2023-08-17 DIAGNOSIS — I11.0 HYPERTENSIVE HEART DISEASE WITH HEART FAILURE: ICD-10-CM

## 2023-08-17 DIAGNOSIS — L02.611 CUTANEOUS ABSCESS OF RIGHT FOOT: ICD-10-CM

## 2023-08-17 DIAGNOSIS — L03.115 CELLULITIS OF RIGHT LOWER LIMB: ICD-10-CM

## 2023-08-17 DIAGNOSIS — Z85.3 PERSONAL HISTORY OF MALIGNANT NEOPLASM OF BREAST: ICD-10-CM

## 2023-08-17 DIAGNOSIS — Z96.643 PRESENCE OF ARTIFICIAL HIP JOINT, BILATERAL: ICD-10-CM

## 2023-08-17 DIAGNOSIS — I97.2 POSTMASTECTOMY LYMPHEDEMA SYNDROME: ICD-10-CM

## 2023-08-17 DIAGNOSIS — Z90.13 ACQUIRED ABSENCE OF BILATERAL BREASTS AND NIPPLES: ICD-10-CM

## 2023-08-17 DIAGNOSIS — I50.33 ACUTE ON CHRONIC DIASTOLIC (CONGESTIVE) HEART FAILURE: ICD-10-CM

## 2023-08-17 RX ORDER — CEFPODOXIME PROXETIL 100 MG
1 TABLET ORAL
Qty: 14 | Refills: 0
Start: 2023-08-17 | End: 2023-08-23

## 2023-08-18 ENCOUNTER — TRANSCRIPTION ENCOUNTER (OUTPATIENT)
Age: 78
End: 2023-08-18

## 2023-08-21 ENCOUNTER — TRANSCRIPTION ENCOUNTER (OUTPATIENT)
Age: 78
End: 2023-08-21

## 2023-08-22 ENCOUNTER — TRANSCRIPTION ENCOUNTER (OUTPATIENT)
Age: 78
End: 2023-08-22

## 2023-08-22 ENCOUNTER — OUTPATIENT (OUTPATIENT)
Dept: OUTPATIENT SERVICES | Facility: HOSPITAL | Age: 78
LOS: 1 days | End: 2023-08-22
Payer: MEDICARE

## 2023-08-22 ENCOUNTER — APPOINTMENT (OUTPATIENT)
Dept: PODIATRY | Facility: CLINIC | Age: 78
End: 2023-08-22
Payer: MEDICARE

## 2023-08-22 DIAGNOSIS — Z90.13 ACQUIRED ABSENCE OF BILATERAL BREASTS AND NIPPLES: Chronic | ICD-10-CM

## 2023-08-22 DIAGNOSIS — Z00.00 ENCOUNTER FOR GENERAL ADULT MEDICAL EXAMINATION WITHOUT ABNORMAL FINDINGS: ICD-10-CM

## 2023-08-22 PROCEDURE — 99213 OFFICE O/P EST LOW 20 MIN: CPT

## 2023-08-24 ENCOUNTER — APPOINTMENT (OUTPATIENT)
Dept: CARDIOLOGY | Facility: CLINIC | Age: 78
End: 2023-08-24
Payer: MEDICARE

## 2023-08-24 VITALS
HEIGHT: 63 IN | SYSTOLIC BLOOD PRESSURE: 122 MMHG | HEART RATE: 68 BPM | WEIGHT: 124 LBS | DIASTOLIC BLOOD PRESSURE: 70 MMHG | BODY MASS INDEX: 21.97 KG/M2

## 2023-08-24 PROCEDURE — 99214 OFFICE O/P EST MOD 30 MIN: CPT

## 2023-08-24 PROCEDURE — 93000 ELECTROCARDIOGRAM COMPLETE: CPT

## 2023-08-24 RX ORDER — AMIODARONE HYDROCHLORIDE 200 MG/1
200 TABLET ORAL
Refills: 0 | Status: DISCONTINUED | COMMUNITY
Start: 2023-08-14 | End: 2023-08-24

## 2023-08-24 RX ORDER — DOXYCYCLINE HYCLATE 100 MG/1
100 CAPSULE ORAL
Refills: 0 | Status: DISCONTINUED | COMMUNITY
Start: 2023-08-14 | End: 2023-08-24

## 2023-08-28 NOTE — PHYSICAL EXAM
[Well Developed] : well developed [Well Nourished] : well nourished [No Acute Distress] : no acute distress [Normal Conjunctiva] : normal conjunctiva [Normal Venous Pressure] : normal venous pressure [No Carotid Bruit] : no carotid bruit [Normal S1, S2] : normal S1, S2 [No Rub] : no rub [No Gallop] : no gallop [Clear Lung Fields] : clear lung fields [Good Air Entry] : good air entry [No Respiratory Distress] : no respiratory distress  [Soft] : abdomen soft [Non Tender] : non-tender [No Masses/organomegaly] : no masses/organomegaly [Normal Bowel Sounds] : normal bowel sounds [Normal Gait] : normal gait [No Edema] : no edema [No Cyanosis] : no cyanosis [No Clubbing] : no clubbing [No Varicosities] : no varicosities [No Rash] : no rash [No Skin Lesions] : no skin lesions [Moves all extremities] : moves all extremities [No Focal Deficits] : no focal deficits [Normal Speech] : normal speech [Alert and Oriented] : alert and oriented [Normal memory] : normal memory [de-identified] : 1/6 systolic murmur at the apex, 2/6 systolic murmur USBs

## 2023-08-28 NOTE — ASSESSMENT
[FreeTextEntry1] : Ms. 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  Impression: (1) S/p robotic PFE excision at [facility] 7/2023 (2) Diastolic heart failure (3) pHTN (4) HLD (5) Remote breast CA s/p resection + XRT  Plan: - Continue standing furosemide - Referral to cardiac rehab - Continue statin - Repeat cardiometabolic labs prior to follow up

## 2023-08-28 NOTE — HISTORY OF PRESENT ILLNESS
[FreeTextEntry1] : Ms. 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  Patient previously followed with Dr. Quiles and was last seen by him in office 6/2022. On our initial visit she reported feeling well without overt cardiopulmonary complaints. She was seen by the CTSx team at [facility] was planned for minimally invasive fibroelastoma removal.  She has since undergoing surgical removal (op-reports pending). She recalls the procedure being complicated due to a right lung pleural abnormality (adhesions). The fibroelastoma was successfully removed. Post-op course was complicated by a LE wound, for which she ultimately was admitted to [facility] for and received antibiotics. She continues to follow with podiatry.  Today feels relatively well from the cardiopulmnonary standpoint. Is trying to do physical activity on her own using a walker.  After our visit we obtained [facility] records (July 15th "Progress Notes") - Robotic right lung lysis of adhesions, robotic resection of AV fibroelastoma 7/12/23 - Post op course notable for DIOGENES, which improved with diuresis. She was discharged on lasix 20mg BID x 5 days.  TOM 8/2022 - Mobile echodensity on the leaflet tip of the RCC measuring 1.1cm which represents stable fibroelastoma of the aortic valve compared to prior TEEs. - Mild-moderate AR  TTE 6/2022 - Normal biventricular function, mild-mod MR, mod pHTN PASP 50mmHg, IASA TTE 6/2023 - EF 60%, indeterminate diastolic function, billowing mitral leaflets without prolapse, focal calcification of the anterior leaflet, mild MR, fibrocalcific Ao valve, fibroelastoma not well visualized TTE 7/2023 - Normal systolic function, G2DD, mild biatrial dilatation, fibrocalcific Ao, mild AI, mild posterior leaflet prolapse, mild MR, mild-mod TR TTE 7/2023 at [facility]: EF 52%, G2DD, mild MAC, mod TR  NM Adenosine Stress 2018 - Negative for ischemia  Labs: - pBNP 160 (<100) - Cr 0.68, K 4.4 - , HDL 74, TG 48, LDL 59, non-HDL 73 - Prior , HDL 70, TG 64, , non- - A1c 5.4%, TSH 1.47, CRP 0.5 (<8)  Cardiac Meds: - Furosemide 20mg - Atenolol 25mg - Rosuvastatin 5mg

## 2023-08-30 NOTE — HISTORY OF PRESENT ILLNESS
[FreeTextEntry1] : Patient presents status post right foot incision and drainage removal of phlegmon

## 2023-08-30 NOTE — PHYSICAL EXAM
[Ankle Swelling (On Exam)] : not present [Varicose Veins Of Lower Extremities] : not present [Delayed in the Right Toes] : capillary refills normal in right toes [Delayed in the Left Toes] : capillary refills normal in the left toes [2+] : left foot dorsalis pedis 2+ [No Joint Swelling] : no joint swelling [Normal Foot/Ankle] : Both lower extremities were exposed and visualized. Standing exam demonstrates normal foot posture and alignment. Hindfoot exam shows no hindfoot valgus or varus [Skin Color & Pigmentation] : normal skin color and pigmentation [Skin Turgor] : normal skin turgor [] : no rash [Skin Lesions] : no skin lesions [Foot Ulcer] : no foot ulcer [Skin Induration] : no skin induration [FreeTextEntry1] : Right foot pain [Sensation] : the sensory exam was normal to light touch and pinprick [No Focal Deficits] : no focal deficits [Deep Tendon Reflexes (DTR)] : deep tendon reflexes were 2+ and symmetric [Motor Exam] : the motor exam was normal [Oriented To Time, Place, And Person] : oriented to person, place, and time [Impaired Insight] : insight and judgment were intact [Affect] : the affect was normal

## 2023-08-30 NOTE — PROCEDURE
[FreeTextEntry1] : Patient evaluated history reviewed Discussed surgery in detail Patient's areas coapted well sutures removed Follow-up in 4 weeks

## 2023-08-31 DIAGNOSIS — L02.91 CUTANEOUS ABSCESS, UNSPECIFIED: ICD-10-CM

## 2023-08-31 DIAGNOSIS — M79.671 PAIN IN RIGHT FOOT: ICD-10-CM
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
## 2023-08-31 RX ORDER — FUROSEMIDE 20 MG/1
20 TABLET ORAL
Qty: 90 | Refills: 3 | Status: ACTIVE | COMMUNITY
Start: 1900-01-01 | End: 1900-01-01

## 2023-09-01 ENCOUNTER — OUTPATIENT (OUTPATIENT)
Dept: OUTPATIENT SERVICES | Facility: HOSPITAL | Age: 78
LOS: 1 days | End: 2023-09-01
Payer: MEDICARE

## 2023-09-01 ENCOUNTER — APPOINTMENT (OUTPATIENT)
Dept: PODIATRY | Facility: CLINIC | Age: 78
End: 2023-09-01
Payer: MEDICARE

## 2023-09-01 DIAGNOSIS — Z90.13 ACQUIRED ABSENCE OF BILATERAL BREASTS AND NIPPLES: Chronic | ICD-10-CM

## 2023-09-01 DIAGNOSIS — Z96.643 PRESENCE OF ARTIFICIAL HIP JOINT, BILATERAL: Chronic | ICD-10-CM

## 2023-09-01 DIAGNOSIS — Z96.642 PRESENCE OF LEFT ARTIFICIAL HIP JOINT: Chronic | ICD-10-CM

## 2023-09-01 DIAGNOSIS — Z00.00 ENCOUNTER FOR GENERAL ADULT MEDICAL EXAMINATION WITHOUT ABNORMAL FINDINGS: ICD-10-CM

## 2023-09-01 PROCEDURE — 99213 OFFICE O/P EST LOW 20 MIN: CPT

## 2023-09-07 ENCOUNTER — RESULT REVIEW (OUTPATIENT)
Age: 78
End: 2023-09-07

## 2023-09-07 ENCOUNTER — OUTPATIENT (OUTPATIENT)
Dept: OUTPATIENT SERVICES | Facility: HOSPITAL | Age: 78
LOS: 1 days | End: 2023-09-07
Payer: MEDICARE

## 2023-09-07 DIAGNOSIS — Z96.642 PRESENCE OF LEFT ARTIFICIAL HIP JOINT: Chronic | ICD-10-CM

## 2023-09-07 DIAGNOSIS — Z90.13 ACQUIRED ABSENCE OF BILATERAL BREASTS AND NIPPLES: Chronic | ICD-10-CM

## 2023-09-07 DIAGNOSIS — N63.10 UNSPECIFIED LUMP IN THE RIGHT BREAST, UNSPECIFIED QUADRANT: ICD-10-CM

## 2023-09-07 DIAGNOSIS — R22.2 LOCALIZED SWELLING, MASS AND LUMP, TRUNK: ICD-10-CM

## 2023-09-07 DIAGNOSIS — Z96.643 PRESENCE OF ARTIFICIAL HIP JOINT, BILATERAL: Chronic | ICD-10-CM

## 2023-09-07 PROCEDURE — 76641 ULTRASOUND BREAST COMPLETE: CPT | Mod: 26,RT

## 2023-09-07 PROCEDURE — 76641 ULTRASOUND BREAST COMPLETE: CPT | Mod: RT

## 2023-09-08 ENCOUNTER — APPOINTMENT (OUTPATIENT)
Dept: PODIATRY | Facility: CLINIC | Age: 78
End: 2023-09-08
Payer: MEDICARE

## 2023-09-08 ENCOUNTER — OUTPATIENT (OUTPATIENT)
Dept: OUTPATIENT SERVICES | Facility: HOSPITAL | Age: 78
LOS: 1 days | End: 2023-09-08
Payer: MEDICARE

## 2023-09-08 DIAGNOSIS — Z96.643 PRESENCE OF ARTIFICIAL HIP JOINT, BILATERAL: Chronic | ICD-10-CM

## 2023-09-08 DIAGNOSIS — Z96.642 PRESENCE OF LEFT ARTIFICIAL HIP JOINT: Chronic | ICD-10-CM

## 2023-09-08 DIAGNOSIS — Z00.00 ENCOUNTER FOR GENERAL ADULT MEDICAL EXAMINATION WITHOUT ABNORMAL FINDINGS: ICD-10-CM

## 2023-09-08 DIAGNOSIS — Z90.13 ACQUIRED ABSENCE OF BILATERAL BREASTS AND NIPPLES: Chronic | ICD-10-CM

## 2023-09-08 DIAGNOSIS — N63.10 UNSPECIFIED LUMP IN THE RIGHT BREAST, UNSPECIFIED QUADRANT: ICD-10-CM

## 2023-09-08 PROCEDURE — 99213 OFFICE O/P EST LOW 20 MIN: CPT

## 2023-09-08 NOTE — HISTORY OF PRESENT ILLNESS
[FreeTextEntry1] : CC: "R foot check after surgery" HPI: -78F presents to clinic for RF check -S/p RF I&D w/removal of phlegmon; 8/10/23 -Reports mild redness, no drainage; was concerned & wanted eval -Pt cleaning RF w/NS; dressing w/Band-Aid -Denies N/V/F/C/pain -No other pedal complaints

## 2023-09-08 NOTE — PHYSICAL EXAM
[2+] : right foot dorsalis pedis 2+ [No Joint Swelling] : no joint swelling [Skin Color & Pigmentation] : normal skin color and pigmentation [Skin Turgor] : normal skin turgor [Skin Lesions] : no skin lesions [Sensation] : the sensory exam was normal to light touch and pinprick [No Focal Deficits] : no focal deficits [Deep Tendon Reflexes (DTR)] : deep tendon reflexes were 2+ and symmetric [Motor Exam] : the motor exam was normal [Oriented To Time, Place, And Person] : oriented to person, place, and time [Impaired Insight] : insight and judgment were intact [Affect] : the affect was normal [General Appearance - Alert] : alert [General Appearance - In No Acute Distress] : in no acute distress [General Appearance - Well Nourished] : well nourished [General Appearance - Well Developed] : well developed [Ankle Swelling On The Right] : of the right ankle [Varicose Veins Of The Right Leg] : on the right foot/ankle [Ankle Swelling (On Exam)] : not present [Varicose Veins Of Lower Extremities] : not present [] : not present [Delayed in the Right Toes] : capillary refills normal in right toes [de-identified] : 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

## 2023-09-08 NOTE — REVIEW OF SYSTEMS
[Joint Stiffness] : joint stiffness [Limb Swelling] : limb swelling [Skin Lesions] : skin lesion [Negative] : Constitutional [Arthralgias] : no arthralgias [Joint Swelling] : no joint swelling [Limb Pain] : no limb pain [Skin Wound] : no skin wound [Itching] : no itching [Change In A Mole] : no change in a mole [Breast Pain] : no breast pain [Breast Lump] : no breast lump

## 2023-09-09 LAB
CULTURE RESULTS: SIGNIFICANT CHANGE UP
SPECIMEN SOURCE: SIGNIFICANT CHANGE UP
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
## 2023-09-14 DIAGNOSIS — M79.671 PAIN IN RIGHT FOOT: ICD-10-CM

## 2023-09-14 DIAGNOSIS — L03.90 CELLULITIS, UNSPECIFIED: ICD-10-CM
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
## 2023-09-21 DIAGNOSIS — L03.90 CELLULITIS, UNSPECIFIED: ICD-10-CM
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
## 2023-09-21 DIAGNOSIS — M79.671 PAIN IN RIGHT FOOT: ICD-10-CM

## 2023-09-22 ENCOUNTER — OUTPATIENT (OUTPATIENT)
Dept: OUTPATIENT SERVICES | Facility: HOSPITAL | Age: 78
LOS: 1 days | End: 2023-09-22
Payer: MEDICARE

## 2023-09-22 ENCOUNTER — APPOINTMENT (OUTPATIENT)
Dept: PODIATRY | Facility: CLINIC | Age: 78
End: 2023-09-22
Payer: MEDICARE

## 2023-09-22 DIAGNOSIS — Z96.642 PRESENCE OF LEFT ARTIFICIAL HIP JOINT: Chronic | ICD-10-CM

## 2023-09-22 DIAGNOSIS — L03.90 CELLULITIS, UNSPECIFIED: ICD-10-CM

## 2023-09-22 DIAGNOSIS — Z00.00 ENCOUNTER FOR GENERAL ADULT MEDICAL EXAMINATION WITHOUT ABNORMAL FINDINGS: ICD-10-CM

## 2023-09-22 DIAGNOSIS — Z96.643 PRESENCE OF ARTIFICIAL HIP JOINT, BILATERAL: Chronic | ICD-10-CM

## 2023-09-22 DIAGNOSIS — Z90.13 ACQUIRED ABSENCE OF BILATERAL BREASTS AND NIPPLES: Chronic | ICD-10-CM

## 2023-09-22 PROCEDURE — 99213 OFFICE O/P EST LOW 20 MIN: CPT

## 2023-09-27 PROBLEM — L03.90 CELLULITIS: Status: ACTIVE | Noted: 2023-08-14

## 2023-09-27 LAB
CULTURE RESULTS: SIGNIFICANT CHANGE UP
SPECIMEN SOURCE: SIGNIFICANT CHANGE UP

## 2023-09-28 DIAGNOSIS — M79.671 PAIN IN RIGHT FOOT: ICD-10-CM
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
## 2023-09-28 DIAGNOSIS — L02.91 CUTANEOUS ABSCESS, UNSPECIFIED: ICD-10-CM

## 2023-10-03 ENCOUNTER — APPOINTMENT (OUTPATIENT)
Dept: CARDIOLOGY | Facility: CLINIC | Age: 78
End: 2023-10-03
Payer: MEDICARE

## 2023-10-03 VITALS
OXYGEN SATURATION: 94 % | SYSTOLIC BLOOD PRESSURE: 116 MMHG | BODY MASS INDEX: 23.39 KG/M2 | HEIGHT: 63 IN | HEART RATE: 74 BPM | WEIGHT: 132 LBS | DIASTOLIC BLOOD PRESSURE: 74 MMHG

## 2023-10-03 DIAGNOSIS — D15.1 BENIGN NEOPLASM OF HEART: ICD-10-CM

## 2023-10-03 PROCEDURE — 99214 OFFICE O/P EST MOD 30 MIN: CPT

## 2023-10-30 ENCOUNTER — OUTPATIENT (OUTPATIENT)
Dept: OUTPATIENT SERVICES | Facility: HOSPITAL | Age: 78
LOS: 1 days | End: 2023-10-30
Payer: MEDICARE

## 2023-10-30 ENCOUNTER — LABORATORY RESULT (OUTPATIENT)
Age: 78
End: 2023-10-30

## 2023-10-30 ENCOUNTER — APPOINTMENT (OUTPATIENT)
Dept: HEMATOLOGY ONCOLOGY | Facility: CLINIC | Age: 78
End: 2023-10-30
Payer: MEDICARE

## 2023-10-30 VITALS
BODY MASS INDEX: 22.68 KG/M2 | RESPIRATION RATE: 16 BRPM | HEART RATE: 84 BPM | SYSTOLIC BLOOD PRESSURE: 151 MMHG | WEIGHT: 128 LBS | HEIGHT: 63 IN | DIASTOLIC BLOOD PRESSURE: 67 MMHG | TEMPERATURE: 98.3 F

## 2023-10-30 DIAGNOSIS — Z96.642 PRESENCE OF LEFT ARTIFICIAL HIP JOINT: Chronic | ICD-10-CM

## 2023-10-30 DIAGNOSIS — Z90.13 ACQUIRED ABSENCE OF BILATERAL BREASTS AND NIPPLES: Chronic | ICD-10-CM

## 2023-10-30 DIAGNOSIS — C50.919 MALIGNANT NEOPLASM OF UNSPECIFIED SITE OF UNSPECIFIED FEMALE BREAST: ICD-10-CM

## 2023-10-30 DIAGNOSIS — R76.8 OTHER SPECIFIED ABNORMAL IMMUNOLOGICAL FINDINGS IN SERUM: ICD-10-CM

## 2023-10-30 DIAGNOSIS — Z96.643 PRESENCE OF ARTIFICIAL HIP JOINT, BILATERAL: Chronic | ICD-10-CM

## 2023-10-30 LAB
HCT VFR BLD CALC: 35.9 %
HGB BLD-MCNC: 11 G/DL
MCHC RBC-ENTMCNC: 28.8 PG
MCHC RBC-ENTMCNC: 30.6 G/DL
MCV RBC AUTO: 94 FL
PLATELET # BLD AUTO: 197 K/UL
PMV BLD: 9.5 FL
RBC # BLD: 3.82 M/UL
RBC # FLD: 13.7 %
WBC # FLD AUTO: 6.31 K/UL

## 2023-10-30 PROCEDURE — 80053 COMPREHEN METABOLIC PANEL: CPT

## 2023-10-30 PROCEDURE — 99214 OFFICE O/P EST MOD 30 MIN: CPT

## 2023-10-30 PROCEDURE — 83521 IG LIGHT CHAINS FREE EACH: CPT | Mod: 59

## 2023-10-30 PROCEDURE — 85027 COMPLETE CBC AUTOMATED: CPT

## 2023-10-31 DIAGNOSIS — C50.919 MALIGNANT NEOPLASM OF UNSPECIFIED SITE OF UNSPECIFIED FEMALE BREAST: ICD-10-CM

## 2023-10-31 LAB
ALBUMIN SERPL ELPH-MCNC: 4.2 G/DL
ALP BLD-CCNC: 76 U/L
ALT SERPL-CCNC: 17 U/L
ANION GAP SERPL CALC-SCNC: 12 MMOL/L
AST SERPL-CCNC: 29 U/L
BILIRUB SERPL-MCNC: 0.5 MG/DL
BUN SERPL-MCNC: 19 MG/DL
CALCIUM SERPL-MCNC: 10 MG/DL
CHLORIDE SERPL-SCNC: 103 MMOL/L
CO2 SERPL-SCNC: 25 MMOL/L
CREAT SERPL-MCNC: 0.9 MG/DL
DEPRECATED KAPPA LC FREE/LAMBDA SER: 1.28 RATIO
EGFR: 65 ML/MIN/1.73M2
GLUCOSE SERPL-MCNC: 91 MG/DL
KAPPA LC CSF-MCNC: 3.09 MG/DL
KAPPA LC SERPL-MCNC: 3.96 MG/DL
POTASSIUM SERPL-SCNC: 4.7 MMOL/L
PROT SERPL-MCNC: 7.4 G/DL
SODIUM SERPL-SCNC: 140 MMOL/L

## 2023-11-07 DIAGNOSIS — R22.31 LOCALIZED SWELLING, MASS AND LUMP, RIGHT UPPER LIMB: ICD-10-CM

## 2023-11-30 ENCOUNTER — NON-APPOINTMENT (OUTPATIENT)
Age: 78
End: 2023-11-30

## 2023-12-07 ENCOUNTER — APPOINTMENT (OUTPATIENT)
Dept: CARDIOLOGY | Facility: CLINIC | Age: 78
End: 2023-12-07

## 2024-01-01 NOTE — ED PROVIDER NOTE - DOMESTIC TRAVEL HIGH RISK QUESTION
The RSV test was negative.     Please continue supportive cares:  Drink plenty of fluid  Can have acetaminophen/ Tylenol or ibuprofen/ Motrin as needed for discomfort or fever  Warm salt water gargles can help soothe the back of the throat and help get clear post nasal drip  Warm tea and honey or warm water with lemon and honey or throat lozenges can be soothing, if age appropriate. No honey for children under one year of age.    Tips to help with nasal congestion:  Cool mist humidifier in the bedroom  Nasal saline and suctioning or nose blowing  Sitting in the bathroom with a hot shower going, the steam will help open up the nasal passageways  Drink plenty of fluids    Follow up for symptoms not improving or worsening, including new fevers or fevers >3 days.     Can return to / school when 24 hours fever free without fever reducing medications, and feeling better.    
No

## 2024-03-26 DIAGNOSIS — R06.09 OTHER FORMS OF DYSPNEA: ICD-10-CM

## 2024-04-10 DIAGNOSIS — R91.1 SOLITARY PULMONARY NODULE: ICD-10-CM

## 2024-04-17 RX ORDER — ROSUVASTATIN CALCIUM 5 MG/1
5 TABLET, FILM COATED ORAL DAILY
Qty: 90 | Refills: 3 | Status: ACTIVE | COMMUNITY
Start: 2022-06-21 | End: 1900-01-01

## 2024-04-17 RX ORDER — ATENOLOL 25 MG/1
25 TABLET ORAL DAILY
Qty: 90 | Refills: 3 | Status: ACTIVE | COMMUNITY
Start: 1900-01-01 | End: 1900-01-01

## 2024-04-21 ENCOUNTER — RESULT REVIEW (OUTPATIENT)
Age: 79
End: 2024-04-21

## 2024-04-21 ENCOUNTER — OUTPATIENT (OUTPATIENT)
Dept: OUTPATIENT SERVICES | Facility: HOSPITAL | Age: 79
LOS: 1 days | End: 2024-04-21
Payer: MEDICARE

## 2024-04-21 DIAGNOSIS — Z90.13 ACQUIRED ABSENCE OF BILATERAL BREASTS AND NIPPLES: Chronic | ICD-10-CM

## 2024-04-21 DIAGNOSIS — Z96.642 PRESENCE OF LEFT ARTIFICIAL HIP JOINT: Chronic | ICD-10-CM

## 2024-04-21 DIAGNOSIS — R91.1 SOLITARY PULMONARY NODULE: ICD-10-CM

## 2024-04-21 DIAGNOSIS — Z96.643 PRESENCE OF ARTIFICIAL HIP JOINT, BILATERAL: Chronic | ICD-10-CM

## 2024-04-21 DIAGNOSIS — Z00.8 ENCOUNTER FOR OTHER GENERAL EXAMINATION: ICD-10-CM

## 2024-04-21 PROCEDURE — 71250 CT THORAX DX C-: CPT

## 2024-04-21 PROCEDURE — 71250 CT THORAX DX C-: CPT | Mod: 26,MH

## 2024-04-22 DIAGNOSIS — R91.1 SOLITARY PULMONARY NODULE: ICD-10-CM

## 2024-05-06 ENCOUNTER — APPOINTMENT (OUTPATIENT)
Age: 79
End: 2024-05-06

## 2024-05-17 DIAGNOSIS — Z00.00 ENCOUNTER FOR GENERAL ADULT MEDICAL EXAMINATION W/OUT ABNORMAL FINDINGS: ICD-10-CM

## 2024-05-17 DIAGNOSIS — E78.5 HYPERLIPIDEMIA, UNSPECIFIED: ICD-10-CM

## 2024-05-26 NOTE — PRE-ANESTHESIA EVALUATION ADULT - BMI (KG/M2)
Kenyetta RN at the bedside, unable to cannulate, 2nd attempt to cannulate done by another HD RN. Bruit and thrill very faint, especially in venous site.   Dr Jones made aware, Renal panel ordered   22.3

## 2024-05-29 DIAGNOSIS — I50.30 UNSPECIFIED DIASTOLIC (CONGESTIVE) HEART FAILURE: ICD-10-CM

## 2024-07-02 ENCOUNTER — APPOINTMENT (OUTPATIENT)
Dept: CARDIOLOGY | Facility: CLINIC | Age: 79
End: 2024-07-02
Payer: MEDICARE

## 2024-07-02 PROCEDURE — 93306 TTE W/DOPPLER COMPLETE: CPT

## 2024-07-26 ENCOUNTER — APPOINTMENT (OUTPATIENT)
Dept: CARDIOLOGY | Facility: CLINIC | Age: 79
End: 2024-07-26
Payer: MEDICARE

## 2024-07-26 ENCOUNTER — NON-APPOINTMENT (OUTPATIENT)
Age: 79
End: 2024-07-26

## 2024-07-26 VITALS
WEIGHT: 124 LBS | SYSTOLIC BLOOD PRESSURE: 130 MMHG | HEART RATE: 70 BPM | HEIGHT: 63 IN | BODY MASS INDEX: 21.97 KG/M2 | DIASTOLIC BLOOD PRESSURE: 72 MMHG

## 2024-07-26 DIAGNOSIS — E78.5 HYPERLIPIDEMIA, UNSPECIFIED: ICD-10-CM

## 2024-07-26 DIAGNOSIS — R91.1 SOLITARY PULMONARY NODULE: ICD-10-CM

## 2024-07-26 DIAGNOSIS — D15.1 BENIGN NEOPLASM OF HEART: ICD-10-CM

## 2024-07-26 PROCEDURE — 93000 ELECTROCARDIOGRAM COMPLETE: CPT

## 2024-07-26 PROCEDURE — 99214 OFFICE O/P EST MOD 30 MIN: CPT

## 2024-07-30 NOTE — HISTORY OF PRESENT ILLNESS
[FreeTextEntry1] : Ms. Reyna is a 79-year-old woman with history of papillary fibroelastoma s/p minimally invasive resection at [facility] 7/2023, mitral regurgitation, tricuspid regurgitation, pulmonary hypertension, hyperlipidemia, and breast cancer presenting for follow up.  Patient previously followed with Dr. Quiles and was last seen by him in office 6/2022. On our initial visit she reported feeling well without overt cardiopulmonary complaints. She was seen by the CTSx team at [facility] was planned for minimally invasive fibroelastoma removal.  She has since undergone surgical removal. She recalls the procedure being complicated due to a right lung pleural abnormality (adhesions). The fibroelastoma was successfully removed. Post-op course was complicated by a LE wound, for which she ultimately was admitted to [facility] for and received antibiotics.  Today feels relatively well from the cardiopulmonary standpoint.  [facility] records (July 15th under "Progress Notes") - Robotic right lung lysis of adhesions, robotic resection of AV fibroelastoma 7/12/23 - Post op course notable for DIOGENES, which improved with diuresis. She was discharged on lasix 20mg BID x 5 days.  TOM 8/2022 - Mobile echodensity on the leaflet tip of the RCC measuring 1.1cm which represents stable fibroelastoma of the aortic valve compared to prior TEEs. - Mild-moderate AR  TTE 6/2022 - Normal biventricular function, mild-mod MR, mod pHTN PASP 50mmHg, IASA TTE 6/2023 - EF 60%, indeterminate diastolic function, billowing mitral leaflets without prolapse, focal calcification of the anterior leaflet, mild MR, fibrocalcific Ao valve, fibroelastoma not well visualized TTE 7/2023 - Normal systolic function, G2DD, mild biatrial dilatation, fibrocalcific Ao, mild AI, mild posterior leaflet prolapse, mild MR, mild-mod TR TTE 7/2023 at [facility]: EF 52%, G2DD, mild MAC, mod TR TTE 7/2024: EF 59%, mild LA dilatation, MVP with mild MR, MAC, sclerotic Ao, PASP 50mmHg  CT Chest 4/2024: 1.3 cm right upper lobe pleural nodule with dense popcorn-like calcifications, which may reflect hamartoma or granuloma (303-101). Not present on earlier study. Recommend follow-up CT scan 6-12 months time to confirm stability.  NM Adenosine Stress 2018 - Negative for ischemia  Labs: - pBNP 160 (<100) - Cr 0.8, K 4.5, normal transaminases - , HDL 60, TG 47, LDL 68, non-HDL 81 - Prior , HDL 70, TG 64, , non- - A1c 5.8%, TSH 1.5, CRP <3 (<8)  Cardiac Meds: - Aspirin 81mg - Furosemide 20mg - Atenolol 25mg - Rosuvastatin 5mg

## 2024-07-30 NOTE — HISTORY OF PRESENT ILLNESS
[FreeTextEntry1] : Ms. Reyna is a 79-year-old woman with history of papillary fibroelastoma s/p minimally invasive resection at [facility] 7/2023, mitral regurgitation, tricuspid regurgitation, pulmonary hypertension, hyperlipidemia, and breast cancer presenting for follow up.  Patient previously followed with Dr. Quiles and was last seen by him in office 6/2022. On our initial visit she reported feeling well without overt cardiopulmonary complaints. She was seen by the CTSx team at [facility] was planned for minimally invasive fibroelastoma removal.  She has since undergone surgical removal. She recalls the procedure being complicated due to a right lung pleural abnormality (adhesions). The fibroelastoma was successfully removed. Post-op course was complicated by a LE wound, for which she ultimately was admitted to [facility] for and received antibiotics.  Today feels relatively well from the cardiopulmonary standpoint.  [facility] records (July 15th under "Progress Notes") - Robotic right lung lysis of adhesions, robotic resection of AV fibroelastoma 7/12/23 - Post op course notable for DIOGENES, which improved with diuresis. She was discharged on lasix 20mg BID x 5 days.  TOM 8/2022 - Mobile echodensity on the leaflet tip of the RCC measuring 1.1cm which represents stable fibroelastoma of the aortic valve compared to prior TEEs. - Mild-moderate AR  TTE 6/2022 - Normal biventricular function, mild-mod MR, mod pHTN PASP 50mmHg, IASA TTE 6/2023 - EF 60%, indeterminate diastolic function, billowing mitral leaflets without prolapse, focal calcification of the anterior leaflet, mild MR, fibrocalcific Ao valve, fibroelastoma not well visualized TTE 7/2023 - Normal systolic function, G2DD, mild biatrial dilatation, fibrocalcific Ao, mild AI, mild posterior leaflet prolapse, mild MR, mild-mod TR TTE 7/2023 at [facility]: EF 52%, G2DD, mild MAC, mod TR TTE 7/2024: EF 59%, mild LA dilatation, MVP with mild MR, MAC, sclerotic Ao, PASP 50mmHg  CT Chest 4/2024: 1.3 cm right upper lobe pleural nodule with dense popcorn-like calcifications, which may reflect hamartoma or granuloma (303-101). Not present on earlier study. Recommend follow-up CT scan 6-12 months time to confirm stability.  NM Adenosine Stress 2018 - Negative for ischemia  Labs: - pBNP 160 (<100) - Cr 0.8, K 4.5, normal transaminases - , HDL 60, TG 47, LDL 68, non-HDL 81 - Prior , HDL 70, TG 64, , non- - A1c 5.8%, TSH 1.5, CRP <3 (<8)  Cardiac Meds: - Aspirin 81mg - Furosemide 20mg - Atenolol 25mg - Rosuvastatin 5mg

## 2024-07-30 NOTE — ASSESSMENT
[FreeTextEntry1] : Ms. 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  Impression: (1) S/p robotic PFE excision at [facility] 7/2023 (2) Diastolic heart failure (3) pHTN (4) HLD (5) Remote breast CA s/p resection + XRT  Plan: - Recommended pulmonary consultation for PFTs - Continue standing furosemide - Referral to cardiac rehab recommended again; referral is in place - Continue aspirin and statin - Repeat cardiometabolic labs prior to follow up  RTC 3 months sooner as needed

## 2024-07-30 NOTE — PHYSICAL EXAM
[Well Developed] : well developed [Well Nourished] : well nourished [No Acute Distress] : no acute distress [Normal Conjunctiva] : normal conjunctiva [Normal Venous Pressure] : normal venous pressure [No Carotid Bruit] : no carotid bruit [Normal S1, S2] : normal S1, S2 [No Rub] : no rub [No Gallop] : no gallop [Clear Lung Fields] : clear lung fields [Good Air Entry] : good air entry [No Respiratory Distress] : no respiratory distress  [Soft] : abdomen soft [Non Tender] : non-tender [No Masses/organomegaly] : no masses/organomegaly [Normal Bowel Sounds] : normal bowel sounds [Normal Gait] : normal gait [No Edema] : no edema [No Cyanosis] : no cyanosis [No Clubbing] : no clubbing [No Varicosities] : no varicosities [No Rash] : no rash [No Skin Lesions] : no skin lesions [Moves all extremities] : moves all extremities [No Focal Deficits] : no focal deficits [Normal Speech] : normal speech [Alert and Oriented] : alert and oriented [Normal memory] : normal memory [de-identified] : 1/6 systolic murmur at the apex, 2/6 systolic murmur USBs

## 2024-07-30 NOTE — PHYSICAL EXAM
[Well Developed] : well developed [Well Nourished] : well nourished [No Acute Distress] : no acute distress [Normal Conjunctiva] : normal conjunctiva [Normal Venous Pressure] : normal venous pressure [No Carotid Bruit] : no carotid bruit [Normal S1, S2] : normal S1, S2 [No Rub] : no rub [No Gallop] : no gallop [Clear Lung Fields] : clear lung fields [Good Air Entry] : good air entry [No Respiratory Distress] : no respiratory distress  [Soft] : abdomen soft [Non Tender] : non-tender [No Masses/organomegaly] : no masses/organomegaly [Normal Bowel Sounds] : normal bowel sounds [Normal Gait] : normal gait [No Edema] : no edema [No Cyanosis] : no cyanosis [No Clubbing] : no clubbing [No Varicosities] : no varicosities [No Rash] : no rash [No Skin Lesions] : no skin lesions [Moves all extremities] : moves all extremities [No Focal Deficits] : no focal deficits [Normal Speech] : normal speech [Alert and Oriented] : alert and oriented [Normal memory] : normal memory [de-identified] : 1/6 systolic murmur at the apex, 2/6 systolic murmur USBs

## 2024-07-30 NOTE — ASSESSMENT
[FreeTextEntry1] : Ms. 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  Impression: (1) S/p robotic PFE excision at [facility] 7/2023 (2) Diastolic heart failure (3) pHTN (4) HLD (5) Remote breast CA s/p resection + XRT  Plan: - Recommended pulmonary consultation for PFTs - Continue standing furosemide - Referral to cardiac rehab recommended again; referral is in place - Continue aspirin and statin - Repeat cardiometabolic labs prior to follow up  RTC 3 months sooner as needed

## 2024-10-10 ENCOUNTER — NON-APPOINTMENT (OUTPATIENT)
Age: 79
End: 2024-10-10

## 2024-10-28 ENCOUNTER — RX RENEWAL (OUTPATIENT)
Age: 79
End: 2024-10-28

## 2024-11-08 ENCOUNTER — OUTPATIENT (OUTPATIENT)
Dept: OUTPATIENT SERVICES | Facility: HOSPITAL | Age: 79
LOS: 1 days | End: 2024-11-08
Payer: MEDICARE

## 2024-11-08 ENCOUNTER — LABORATORY RESULT (OUTPATIENT)
Age: 79
End: 2024-11-08

## 2024-11-08 ENCOUNTER — APPOINTMENT (OUTPATIENT)
Age: 79
End: 2024-11-08
Payer: MEDICARE

## 2024-11-08 VITALS
SYSTOLIC BLOOD PRESSURE: 150 MMHG | RESPIRATION RATE: 16 BRPM | WEIGHT: 126 LBS | BODY MASS INDEX: 23.48 KG/M2 | HEART RATE: 67 BPM | HEIGHT: 61.5 IN | TEMPERATURE: 98.1 F | DIASTOLIC BLOOD PRESSURE: 82 MMHG

## 2024-11-08 DIAGNOSIS — C50.919 MALIGNANT NEOPLASM OF UNSPECIFIED SITE OF UNSPECIFIED FEMALE BREAST: ICD-10-CM

## 2024-11-08 DIAGNOSIS — Z96.643 PRESENCE OF ARTIFICIAL HIP JOINT, BILATERAL: Chronic | ICD-10-CM

## 2024-11-08 DIAGNOSIS — Z90.13 ACQUIRED ABSENCE OF BILATERAL BREASTS AND NIPPLES: Chronic | ICD-10-CM

## 2024-11-08 DIAGNOSIS — R76.8 OTHER SPECIFIED ABNORMAL IMMUNOLOGICAL FINDINGS IN SERUM: ICD-10-CM

## 2024-11-08 DIAGNOSIS — Z96.642 PRESENCE OF LEFT ARTIFICIAL HIP JOINT: Chronic | ICD-10-CM

## 2024-11-08 PROCEDURE — 85027 COMPLETE CBC AUTOMATED: CPT

## 2024-11-08 PROCEDURE — 99214 OFFICE O/P EST MOD 30 MIN: CPT

## 2024-11-08 PROCEDURE — 80053 COMPREHEN METABOLIC PANEL: CPT

## 2024-11-09 DIAGNOSIS — C50.919 MALIGNANT NEOPLASM OF UNSPECIFIED SITE OF UNSPECIFIED FEMALE BREAST: ICD-10-CM

## 2024-11-09 LAB
ALBUMIN SERPL ELPH-MCNC: 4.4 G/DL
ALP BLD-CCNC: 69 U/L
ALT SERPL-CCNC: 16 U/L
ANION GAP SERPL CALC-SCNC: 11 MMOL/L
AST SERPL-CCNC: 29 U/L
BILIRUB SERPL-MCNC: 0.7 MG/DL
BUN SERPL-MCNC: 16 MG/DL
CALCIUM SERPL-MCNC: 10 MG/DL
CHLORIDE SERPL-SCNC: 101 MMOL/L
CO2 SERPL-SCNC: 27 MMOL/L
CREAT SERPL-MCNC: 0.8 MG/DL
EGFR: 75 ML/MIN/1.73M2
GLUCOSE SERPL-MCNC: 66 MG/DL
HCT VFR BLD CALC: 37.1 %
HGB BLD-MCNC: 12.2 G/DL
MCHC RBC-ENTMCNC: 30.9 PG
MCHC RBC-ENTMCNC: 32.9 G/DL
MCV RBC AUTO: 93.9 FL
PLATELET # BLD AUTO: 174 K/UL
PMV BLD: 9.2 FL
POTASSIUM SERPL-SCNC: 4.7 MMOL/L
PROT SERPL-MCNC: 6.8 G/DL
RBC # BLD: 3.95 M/UL
RBC # FLD: 12.1 %
SODIUM SERPL-SCNC: 139 MMOL/L
WBC # FLD AUTO: 5.47 K/UL

## 2024-11-14 NOTE — ASU DISCHARGE PLAN (ADULT/PEDIATRIC) - DISCHARGE PLAN IS COMPLETE AND GIVEN TO PATIENT
CBC stable.  No anemia.  Hemoglobin improved.  Thyroid level improved.  TSH level 5.  Free T40.8.  Total cholesterol 217.  Goal less than 200.  Closer attention to low-cholesterol diet Mediterranean type diet fasting blood sugar stable 105.  Kidney test and electrolytes stable.  Liver test stable.  Hemoglobin A1c stable 5.3.  3-month average blood sugars around 100.  Urine test stable no significant protein PSA level/prostate level stable 2.7
: Yes

## 2024-12-19 ENCOUNTER — APPOINTMENT (OUTPATIENT)
Dept: CARDIOLOGY | Facility: CLINIC | Age: 79
End: 2024-12-19
Payer: MEDICARE

## 2024-12-19 ENCOUNTER — NON-APPOINTMENT (OUTPATIENT)
Age: 79
End: 2024-12-19

## 2024-12-19 VITALS
HEART RATE: 62 BPM | TEMPERATURE: 97.1 F | SYSTOLIC BLOOD PRESSURE: 163 MMHG | WEIGHT: 123 LBS | DIASTOLIC BLOOD PRESSURE: 76 MMHG | HEIGHT: 63 IN | BODY MASS INDEX: 21.79 KG/M2

## 2024-12-19 DIAGNOSIS — I50.30 UNSPECIFIED DIASTOLIC (CONGESTIVE) HEART FAILURE: ICD-10-CM

## 2024-12-19 DIAGNOSIS — M79.89 OTHER SPECIFIED SOFT TISSUE DISORDERS: ICD-10-CM

## 2024-12-19 PROCEDURE — 93000 ELECTROCARDIOGRAM COMPLETE: CPT

## 2024-12-19 PROCEDURE — 99214 OFFICE O/P EST MOD 30 MIN: CPT

## 2024-12-31 NOTE — PROGRESS NOTE ADULT - EYES
Discussed results with patient. MRI has been ordered.     April
Handled in separate encounter  
PERRL/EOMI/conjunctiva clear/normal

## 2025-03-31 ENCOUNTER — RX RENEWAL (OUTPATIENT)
Age: 80
End: 2025-03-31

## 2025-04-11 DIAGNOSIS — E78.5 HYPERLIPIDEMIA, UNSPECIFIED: ICD-10-CM

## 2025-04-11 DIAGNOSIS — I50.30 UNSPECIFIED DIASTOLIC (CONGESTIVE) HEART FAILURE: ICD-10-CM

## 2025-04-11 DIAGNOSIS — Z00.00 ENCOUNTER FOR GENERAL ADULT MEDICAL EXAMINATION W/OUT ABNORMAL FINDINGS: ICD-10-CM

## 2025-04-28 ENCOUNTER — APPOINTMENT (OUTPATIENT)
Dept: CARDIOLOGY | Facility: CLINIC | Age: 80
End: 2025-04-28
Payer: MEDICARE

## 2025-04-28 PROCEDURE — 93970 EXTREMITY STUDY: CPT

## 2025-05-20 ENCOUNTER — APPOINTMENT (OUTPATIENT)
Dept: CARDIOLOGY | Facility: CLINIC | Age: 80
End: 2025-05-20

## 2025-05-22 DIAGNOSIS — M79.89 OTHER SPECIFIED SOFT TISSUE DISORDERS: ICD-10-CM

## 2025-06-03 ENCOUNTER — APPOINTMENT (OUTPATIENT)
Dept: CARDIOLOGY | Facility: CLINIC | Age: 80
End: 2025-06-03
Payer: MEDICARE

## 2025-06-03 VITALS
DIASTOLIC BLOOD PRESSURE: 80 MMHG | WEIGHT: 123 LBS | HEART RATE: 72 BPM | HEIGHT: 63 IN | BODY MASS INDEX: 21.79 KG/M2 | SYSTOLIC BLOOD PRESSURE: 165 MMHG

## 2025-06-03 DIAGNOSIS — M79.89 OTHER SPECIFIED SOFT TISSUE DISORDERS: ICD-10-CM

## 2025-06-03 DIAGNOSIS — I87.2 VENOUS INSUFFICIENCY (CHRONIC) (PERIPHERAL): ICD-10-CM

## 2025-06-03 PROCEDURE — 99214 OFFICE O/P EST MOD 30 MIN: CPT

## 2025-06-04 ENCOUNTER — APPOINTMENT (OUTPATIENT)
Dept: CARDIOLOGY | Facility: CLINIC | Age: 80
End: 2025-06-04

## 2025-06-04 ENCOUNTER — APPOINTMENT (OUTPATIENT)
Dept: CARDIOLOGY | Facility: CLINIC | Age: 80
End: 2025-06-04
Payer: MEDICARE

## 2025-06-04 VITALS
SYSTOLIC BLOOD PRESSURE: 155 MMHG | HEART RATE: 64 BPM | DIASTOLIC BLOOD PRESSURE: 75 MMHG | WEIGHT: 125 LBS | HEIGHT: 63 IN | BODY MASS INDEX: 22.15 KG/M2

## 2025-06-04 DIAGNOSIS — D15.1 BENIGN NEOPLASM OF HEART: ICD-10-CM

## 2025-06-04 DIAGNOSIS — E78.5 HYPERLIPIDEMIA, UNSPECIFIED: ICD-10-CM

## 2025-06-04 DIAGNOSIS — I50.30 UNSPECIFIED DIASTOLIC (CONGESTIVE) HEART FAILURE: ICD-10-CM

## 2025-06-04 PROBLEM — I87.2 VENOUS INSUFFICIENCY, PERIPHERAL: Status: ACTIVE | Noted: 2025-06-04

## 2025-06-04 PROCEDURE — G2211 COMPLEX E/M VISIT ADD ON: CPT

## 2025-06-04 PROCEDURE — 93000 ELECTROCARDIOGRAM COMPLETE: CPT

## 2025-06-04 PROCEDURE — 99214 OFFICE O/P EST MOD 30 MIN: CPT

## 2025-06-18 ENCOUNTER — APPOINTMENT (OUTPATIENT)
Dept: CARDIOLOGY | Facility: CLINIC | Age: 80
End: 2025-06-18
Payer: MEDICARE

## 2025-06-18 PROCEDURE — 93306 TTE W/DOPPLER COMPLETE: CPT

## 2025-06-20 ENCOUNTER — APPOINTMENT (OUTPATIENT)
Dept: CARDIOLOGY | Facility: CLINIC | Age: 80
End: 2025-06-20

## 2025-07-02 ENCOUNTER — APPOINTMENT (OUTPATIENT)
Dept: CARDIOLOGY | Facility: CLINIC | Age: 80
End: 2025-07-02

## 2025-07-14 ENCOUNTER — APPOINTMENT (OUTPATIENT)
Dept: CARDIOLOGY | Facility: CLINIC | Age: 80
End: 2025-07-14

## 2025-07-30 ENCOUNTER — APPOINTMENT (OUTPATIENT)
Dept: CARDIOLOGY | Facility: CLINIC | Age: 80
End: 2025-07-30
Payer: MEDICARE

## 2025-07-30 PROCEDURE — 93923 UPR/LXTR ART STDY 3+ LVLS: CPT

## 2025-08-11 ENCOUNTER — APPOINTMENT (OUTPATIENT)
Dept: CARDIOLOGY | Facility: CLINIC | Age: 80
End: 2025-08-11
Payer: MEDICARE

## 2025-08-11 PROCEDURE — 93925 LOWER EXTREMITY STUDY: CPT

## 2025-08-14 ENCOUNTER — APPOINTMENT (OUTPATIENT)
Dept: CARDIOLOGY | Facility: CLINIC | Age: 80
End: 2025-08-14
Payer: MEDICARE

## 2025-08-14 VITALS
BODY MASS INDEX: 21.62 KG/M2 | SYSTOLIC BLOOD PRESSURE: 124 MMHG | WEIGHT: 122 LBS | DIASTOLIC BLOOD PRESSURE: 70 MMHG | HEIGHT: 63 IN

## 2025-08-14 DIAGNOSIS — I73.9 PERIPHERAL VASCULAR DISEASE, UNSPECIFIED: ICD-10-CM

## 2025-08-14 DIAGNOSIS — E78.5 HYPERLIPIDEMIA, UNSPECIFIED: ICD-10-CM

## 2025-08-14 DIAGNOSIS — R68.89 OTHER GENERAL SYMPTOMS AND SIGNS: ICD-10-CM

## 2025-08-14 DIAGNOSIS — I87.2 VENOUS INSUFFICIENCY (CHRONIC) (PERIPHERAL): ICD-10-CM

## 2025-08-14 DIAGNOSIS — R06.09 OTHER FORMS OF DYSPNEA: ICD-10-CM

## 2025-08-14 DIAGNOSIS — M79.89 OTHER SPECIFIED SOFT TISSUE DISORDERS: ICD-10-CM

## 2025-08-14 DIAGNOSIS — I89.0 LYMPHEDEMA, NOT ELSEWHERE CLASSIFIED: ICD-10-CM

## 2025-08-14 DIAGNOSIS — I50.30 UNSPECIFIED DIASTOLIC (CONGESTIVE) HEART FAILURE: ICD-10-CM

## 2025-08-14 DIAGNOSIS — R73.03 PREDIABETES.: ICD-10-CM

## 2025-08-14 PROCEDURE — 99214 OFFICE O/P EST MOD 30 MIN: CPT

## 2025-08-14 PROCEDURE — G2211 COMPLEX E/M VISIT ADD ON: CPT

## 2025-09-17 ENCOUNTER — APPOINTMENT (OUTPATIENT)
Dept: CARDIOLOGY | Facility: CLINIC | Age: 80
End: 2025-09-17

## 2025-09-17 VITALS
WEIGHT: 122 LBS | BODY MASS INDEX: 21.62 KG/M2 | HEIGHT: 63 IN | DIASTOLIC BLOOD PRESSURE: 79 MMHG | HEART RATE: 70 BPM | SYSTOLIC BLOOD PRESSURE: 154 MMHG

## 2025-09-17 DIAGNOSIS — I73.9 PERIPHERAL VASCULAR DISEASE, UNSPECIFIED: ICD-10-CM

## 2025-09-17 DIAGNOSIS — I50.30 UNSPECIFIED DIASTOLIC (CONGESTIVE) HEART FAILURE: ICD-10-CM

## 2025-09-17 DIAGNOSIS — Z00.00 ENCOUNTER FOR GENERAL ADULT MEDICAL EXAMINATION W/OUT ABNORMAL FINDINGS: ICD-10-CM

## 2025-09-17 DIAGNOSIS — E78.5 HYPERLIPIDEMIA, UNSPECIFIED: ICD-10-CM

## 2025-09-17 DIAGNOSIS — M79.89 OTHER SPECIFIED SOFT TISSUE DISORDERS: ICD-10-CM
